# Patient Record
Sex: MALE | Race: WHITE | URBAN - METROPOLITAN AREA
[De-identification: names, ages, dates, MRNs, and addresses within clinical notes are randomized per-mention and may not be internally consistent; named-entity substitution may affect disease eponyms.]

---

## 2017-03-21 PROBLEM — Z00.00 ENCOUNTER FOR PREVENTIVE HEALTH EXAMINATION: Status: ACTIVE | Noted: 2017-03-21

## 2017-11-06 ENCOUNTER — INPATIENT (INPATIENT)
Facility: HOSPITAL | Age: 37
LOS: 2 days | Discharge: HOME | End: 2017-11-09
Attending: FAMILY MEDICINE | Admitting: FAMILY MEDICINE
Payer: COMMERCIAL

## 2017-11-06 VITALS — WEIGHT: 199.96 LBS

## 2017-11-06 LAB
ALBUMIN SERPL ELPH-MCNC: 3.9 G/DL — SIGNIFICANT CHANGE UP (ref 3.3–5)
ALP SERPL-CCNC: 95 U/L — SIGNIFICANT CHANGE UP (ref 40–120)
ALT FLD-CCNC: 35 U/L — SIGNIFICANT CHANGE UP (ref 12–78)
AMPHET UR-MCNC: NEGATIVE — SIGNIFICANT CHANGE UP
ANION GAP SERPL CALC-SCNC: 5 MMOL/L — SIGNIFICANT CHANGE UP (ref 5–17)
ANISOCYTOSIS BLD QL: SLIGHT — SIGNIFICANT CHANGE UP
APAP SERPL-MCNC: < 2 UG/ML — SIGNIFICANT CHANGE UP (ref 10–30)
APPEARANCE UR: CLEAR — SIGNIFICANT CHANGE UP
APTT BLD: 30.5 SEC — SIGNIFICANT CHANGE UP (ref 27.5–37.4)
AST SERPL-CCNC: 15 U/L — SIGNIFICANT CHANGE UP (ref 15–37)
BARBITURATES UR SCN-MCNC: NEGATIVE — SIGNIFICANT CHANGE UP
BASE EXCESS BLDV CALC-SCNC: 1.9 MMOL/L — SIGNIFICANT CHANGE UP (ref -2–2)
BASOPHILS # BLD AUTO: 0.1 K/UL — SIGNIFICANT CHANGE UP (ref 0–0.2)
BASOPHILS NFR BLD AUTO: 0.9 % — SIGNIFICANT CHANGE UP (ref 0–2)
BENZODIAZ UR-MCNC: POSITIVE — SIGNIFICANT CHANGE UP
BILIRUB SERPL-MCNC: 0.3 MG/DL — SIGNIFICANT CHANGE UP (ref 0.2–1.2)
BILIRUB UR-MCNC: NEGATIVE — SIGNIFICANT CHANGE UP
BUN SERPL-MCNC: 15 MG/DL — SIGNIFICANT CHANGE UP (ref 7–23)
CALCIUM SERPL-MCNC: 9.2 MG/DL — SIGNIFICANT CHANGE UP (ref 8.5–10.1)
CHLORIDE SERPL-SCNC: 105 MMOL/L — SIGNIFICANT CHANGE UP (ref 96–108)
CO2 SERPL-SCNC: 28 MMOL/L — SIGNIFICANT CHANGE UP (ref 22–31)
COCAINE METAB.OTHER UR-MCNC: NEGATIVE — SIGNIFICANT CHANGE UP
COLOR SPEC: YELLOW — SIGNIFICANT CHANGE UP
CREAT SERPL-MCNC: 1 MG/DL — SIGNIFICANT CHANGE UP (ref 0.5–1.3)
DIFF PNL FLD: NEGATIVE — SIGNIFICANT CHANGE UP
EOSINOPHIL # BLD AUTO: 0.7 K/UL — HIGH (ref 0–0.5)
EOSINOPHIL NFR BLD AUTO: 5.4 % — SIGNIFICANT CHANGE UP (ref 0–6)
ETHANOL SERPL-MCNC: <10 MG/DL — SIGNIFICANT CHANGE UP (ref 0–10)
GLUCOSE SERPL-MCNC: 86 MG/DL — SIGNIFICANT CHANGE UP (ref 70–99)
GLUCOSE UR QL: NEGATIVE MG/DL — SIGNIFICANT CHANGE UP
HCO3 BLDV-SCNC: 27 MMOL/L — SIGNIFICANT CHANGE UP (ref 21–29)
HCT VFR BLD CALC: 40.6 % — SIGNIFICANT CHANGE UP (ref 39–50)
HGB BLD-MCNC: 13.6 G/DL — SIGNIFICANT CHANGE UP (ref 13–17)
INR BLD: 1 RATIO — SIGNIFICANT CHANGE UP (ref 0.88–1.16)
KETONES UR-MCNC: NEGATIVE — SIGNIFICANT CHANGE UP
LACTATE SERPL-SCNC: 1.1 MMOL/L — SIGNIFICANT CHANGE UP (ref 0.7–2)
LEUKOCYTE ESTERASE UR-ACNC: NEGATIVE — SIGNIFICANT CHANGE UP
LYMPHOCYTES # BLD AUTO: 2.7 K/UL — SIGNIFICANT CHANGE UP (ref 1–3.3)
LYMPHOCYTES # BLD AUTO: 21.7 % — SIGNIFICANT CHANGE UP (ref 13–44)
MACROCYTES BLD QL: SLIGHT — SIGNIFICANT CHANGE UP
MAGNESIUM SERPL-MCNC: 2.3 MG/DL — SIGNIFICANT CHANGE UP (ref 1.6–2.6)
MANUAL DIF COMMENT BLD-IMP: SIGNIFICANT CHANGE UP
MCHC RBC-ENTMCNC: 33.5 GM/DL — SIGNIFICANT CHANGE UP (ref 32–36)
MCHC RBC-ENTMCNC: 36.4 PG — HIGH (ref 27–34)
MCV RBC AUTO: 108.4 FL — HIGH (ref 80–100)
METHADONE UR-MCNC: NEGATIVE — SIGNIFICANT CHANGE UP
METHGB MFR BLDV: 0.8 % — SIGNIFICANT CHANGE UP (ref 0–1.5)
MONOCYTES # BLD AUTO: 1.6 K/UL — HIGH (ref 0–0.9)
MONOCYTES NFR BLD AUTO: 12.3 % — SIGNIFICANT CHANGE UP (ref 2–14)
NEUTROPHILS # BLD AUTO: 7.5 K/UL — HIGH (ref 1.8–7.4)
NEUTROPHILS NFR BLD AUTO: 59.7 % — SIGNIFICANT CHANGE UP (ref 43–77)
NEUTS HYPERSEG # BLD: PRESENT — SIGNIFICANT CHANGE UP
NITRITE UR-MCNC: NEGATIVE — SIGNIFICANT CHANGE UP
OPIATES UR-MCNC: NEGATIVE — SIGNIFICANT CHANGE UP
PCO2 BLDV: 46 MMHG — SIGNIFICANT CHANGE UP (ref 35–50)
PCP SPEC-MCNC: SIGNIFICANT CHANGE UP
PCP UR-MCNC: NEGATIVE — SIGNIFICANT CHANGE UP
PH BLDV: 7.38 — SIGNIFICANT CHANGE UP (ref 7.35–7.45)
PH UR: 7 — SIGNIFICANT CHANGE UP (ref 5–8)
PHOSPHATE SERPL-MCNC: 4.5 MG/DL — SIGNIFICANT CHANGE UP (ref 2.5–4.5)
PLAT MORPH BLD: NORMAL — SIGNIFICANT CHANGE UP
PLATELET # BLD AUTO: 374 K/UL — SIGNIFICANT CHANGE UP (ref 150–400)
PO2 BLDV: 129 MMHG — HIGH (ref 25–45)
POIKILOCYTOSIS BLD QL AUTO: SLIGHT — SIGNIFICANT CHANGE UP
POLYCHROMASIA BLD QL SMEAR: SLIGHT — SIGNIFICANT CHANGE UP
POTASSIUM SERPL-MCNC: 4.2 MMOL/L — SIGNIFICANT CHANGE UP (ref 3.5–5.3)
POTASSIUM SERPL-SCNC: 4.2 MMOL/L — SIGNIFICANT CHANGE UP (ref 3.5–5.3)
PROT SERPL-MCNC: 7.9 GM/DL — SIGNIFICANT CHANGE UP (ref 6–8.3)
PROT UR-MCNC: NEGATIVE MG/DL — SIGNIFICANT CHANGE UP
PROTHROM AB SERPL-ACNC: 10.8 SEC — SIGNIFICANT CHANGE UP (ref 9.8–12.7)
RBC # BLD: 3.75 M/UL — LOW (ref 4.2–5.8)
RBC # FLD: 16.2 % — HIGH (ref 10.3–14.5)
RBC BLD AUTO: (no result)
ROULEAUX BLD QL SMEAR: PRESENT — SIGNIFICANT CHANGE UP
SALICYLATES SERPL-MCNC: <1.7 MG/DL — LOW (ref 2.8–20)
SAO2 % BLDV: 96 % — HIGH (ref 67–88)
SODIUM SERPL-SCNC: 138 MMOL/L — SIGNIFICANT CHANGE UP (ref 135–145)
SP GR SPEC: 1.01 — SIGNIFICANT CHANGE UP (ref 1.01–1.02)
STOMATOCYTES BLD QL SMEAR: PRESENT — SIGNIFICANT CHANGE UP
THC UR QL: POSITIVE — SIGNIFICANT CHANGE UP
TSH SERPL-MCNC: 2.53 UIU/ML — SIGNIFICANT CHANGE UP (ref 0.36–3.74)
UROBILINOGEN FLD QL: NEGATIVE MG/DL — SIGNIFICANT CHANGE UP
WBC # BLD: 12.6 K/UL — HIGH (ref 3.8–10.5)
WBC # FLD AUTO: 12.6 K/UL — HIGH (ref 3.8–10.5)

## 2017-11-06 PROCEDURE — 99285 EMERGENCY DEPT VISIT HI MDM: CPT

## 2017-11-06 PROCEDURE — 93010 ELECTROCARDIOGRAM REPORT: CPT

## 2017-11-06 PROCEDURE — 70450 CT HEAD/BRAIN W/O DYE: CPT | Mod: 26

## 2017-11-06 RX ORDER — THIAMINE MONONITRATE (VIT B1) 100 MG
100 TABLET ORAL ONCE
Qty: 0 | Refills: 0 | Status: COMPLETED | OUTPATIENT
Start: 2017-11-06 | End: 2017-11-06

## 2017-11-06 RX ORDER — PREGABALIN 225 MG/1
800 CAPSULE ORAL ONCE
Qty: 0 | Refills: 0 | Status: COMPLETED | OUTPATIENT
Start: 2017-11-06 | End: 2017-11-06

## 2017-11-06 RX ORDER — FOLIC ACID 0.8 MG
1 TABLET ORAL ONCE
Qty: 0 | Refills: 0 | Status: COMPLETED | OUTPATIENT
Start: 2017-11-06 | End: 2017-11-06

## 2017-11-06 RX ORDER — PREGABALIN 225 MG/1
200 CAPSULE ORAL ONCE
Qty: 0 | Refills: 0 | Status: COMPLETED | OUTPATIENT
Start: 2017-11-06 | End: 2017-11-06

## 2017-11-06 RX ADMIN — PREGABALIN 200 MICROGRAM(S): 225 CAPSULE ORAL at 20:22

## 2017-11-06 RX ADMIN — PREGABALIN 800 MICROGRAM(S): 225 CAPSULE ORAL at 21:00

## 2017-11-06 RX ADMIN — Medication 100 MILLIGRAM(S): at 22:35

## 2017-11-06 RX ADMIN — Medication 1 MILLIGRAM(S): at 22:35

## 2017-11-06 NOTE — ED PROVIDER NOTE - MEDICAL DECISION MAKING DETAILS
35 yo male with ataxic gait and diffuse numbness secondary to daily nitrous oxide abuse for the past month.  Will obtain labs and consult Toxicology.

## 2017-11-06 NOTE — ED PROVIDER NOTE - ENMT, MLM
Airway patent, Nasal mucosa clear. Mouth with mildly dry mucosa. Throat has no vesicles, no oropharyngeal exudates and uvula is midline. Neck non-tender, supple.

## 2017-11-06 NOTE — ED STATDOCS - FALLEN IN THE PAST
Called again, made contact with Dain who states she has been checking her bs levels x 1 week. Unclear how her bs overall is doing, she did not give me actual readings. States she will bring them all in on Monday, 9/18/2018. Informs me her fasting bs have been 90-95. One to two hours postprandials have been a wide range of 100- 174, with x 1 highest reading of 198 (1 hr postprandial). Unable to get a good feel of what she may need further education on. Will wait to see what her readings actually are, eating habits,exercise, etc. Message routed to inform Dr Jenkins. Matilde HERRERA   no

## 2017-11-06 NOTE — ED PROVIDER NOTE - PROGRESS NOTE DETAILS
Hilario Marinelli ([facility]) sean Kruse: paging [facility] Toxicology on call Hilario Marinelli (scribe) for Dr. Kruse: Spoke with Toxicology, agrees with admission to Med with in-patient Neuro consult, recommends brain MRI/MRN as nitrous oxide abuse leads to posterior column spinal cord manifestations as well as cerebellar degenerative changes, some of which may become chronic.  Toxicology will call back in 1-2 hours when labs result Hilario Marinelli ([facility]) for Dr. Kruse: paging [facility] Toxicology on call -- [phone] Dr. Kruse:  [facility] Tox advises po Thiamine, folate, as well as inpt. MRI brain & spinal cord, inpt. Neuro consult.

## 2017-11-06 NOTE — ED ADULT TRIAGE NOTE - CHIEF COMPLAINT QUOTE
pt c/o numbness to his legs pt walk with unsteady gait. pt has been sniffing nitrous oxide was send by Dr Donaldson to r/o acute toxicity pt c/o numbness to his legs pt  is walking  with unsteady gait. pt has been sniffing nitrous oxide was send by Dr Donaldson to r/o acute toxicity. last nitrous oxide use  was on last Thursday

## 2017-11-06 NOTE — ED ADULT NURSE NOTE - CHIEF COMPLAINT QUOTE
pt c/o numbness to his legs pt  is walking  with unsteady gait. pt has been sniffing nitrous oxide was send by Dr Donaldson to r/o acute toxicity. last nitrous oxide use  was on last Thursday

## 2017-11-06 NOTE — ED PROVIDER NOTE - NEUROLOGICAL, MLM
A&Ox3.  Decreased fine motor skills of hands.  Bilateral lower extremities with decreased sensation, worse distally. +Ataxic gait. CN II-XII intact.

## 2017-11-06 NOTE — ED PROVIDER NOTE - MUSCULOSKELETAL, MLM
KHANNA x4. UPPER EXTREMITIES: normal gross motor and sensation, +Decreased fine motor skills, normal hand grasp bilat.  LOWER EXTREMITIES: normal strength bilat.

## 2017-11-06 NOTE — ED STATDOCS - OBJECTIVE STATEMENT
36 y-o Male with no significant PMHX presents to the ED c/o numbness. Pt states Friday was last day he did whip its and since then has had unstable gait and hand and feet numbness. + confusion. Denies SOB, CP, N/V/D Pt will be sent to main for further Evaluation. 36 y-o Male with no significant PMHx presents to the ED c/o numbness. Pt states Friday was last day he did whippits (was using >1 box every day x 6 weeks) and since then has had unstable gait and hand and feet numbness. + confusion. Denies SOB, CP, N/V/D Pt will be sent to main for further Evaluation.

## 2017-11-06 NOTE — ED PROVIDER NOTE - OBJECTIVE STATEMENT
35 yo male presents with gait ataxia and diffuse numbness and tingling secondary to sniffing nitrous oxide daily for the last 4 weeks.  States that he stopped using on Friday but symptoms have been progressively worsening since then.  Reports inability to walk and write, has numbness and tingling to hands and from the mid-chest down.  +Diffuse myalgias.  Denies abd pain, N/V/D, fever, headache.  +Marijuana use.  Denies alcohol use.  Pt states he has been on antibiotics and advair for respiratory infection for the past 2-3 weeks.  No recent falls.  PCP Dr. Carranza.

## 2017-11-07 DIAGNOSIS — F90.9 ATTENTION-DEFICIT HYPERACTIVITY DISORDER, UNSPECIFIED TYPE: ICD-10-CM

## 2017-11-07 DIAGNOSIS — Z98.890 OTHER SPECIFIED POSTPROCEDURAL STATES: Chronic | ICD-10-CM

## 2017-11-07 DIAGNOSIS — R20.0 ANESTHESIA OF SKIN: ICD-10-CM

## 2017-11-07 DIAGNOSIS — Z29.9 ENCOUNTER FOR PROPHYLACTIC MEASURES, UNSPECIFIED: ICD-10-CM

## 2017-11-07 DIAGNOSIS — F17.200 NICOTINE DEPENDENCE, UNSPECIFIED, UNCOMPLICATED: ICD-10-CM

## 2017-11-07 DIAGNOSIS — R26.0 ATAXIC GAIT: ICD-10-CM

## 2017-11-07 DIAGNOSIS — J44.9 CHRONIC OBSTRUCTIVE PULMONARY DISEASE, UNSPECIFIED: ICD-10-CM

## 2017-11-07 DIAGNOSIS — J40 BRONCHITIS, NOT SPECIFIED AS ACUTE OR CHRONIC: ICD-10-CM

## 2017-11-07 DIAGNOSIS — G62.2 POLYNEUROPATHY DUE TO OTHER TOXIC AGENTS: ICD-10-CM

## 2017-11-07 LAB
ANION GAP SERPL CALC-SCNC: 5 MMOL/L — SIGNIFICANT CHANGE UP (ref 5–17)
ANISOCYTOSIS BLD QL: SLIGHT — SIGNIFICANT CHANGE UP
BASOPHILS # BLD AUTO: 0.1 K/UL — SIGNIFICANT CHANGE UP (ref 0–0.2)
BASOPHILS NFR BLD AUTO: 1.2 % — SIGNIFICANT CHANGE UP (ref 0–2)
BUN SERPL-MCNC: 15 MG/DL — SIGNIFICANT CHANGE UP (ref 7–23)
CALCIUM SERPL-MCNC: 8.4 MG/DL — LOW (ref 8.5–10.1)
CHLORIDE SERPL-SCNC: 108 MMOL/L — SIGNIFICANT CHANGE UP (ref 96–108)
CO2 SERPL-SCNC: 25 MMOL/L — SIGNIFICANT CHANGE UP (ref 22–31)
CREAT SERPL-MCNC: 0.93 MG/DL — SIGNIFICANT CHANGE UP (ref 0.5–1.3)
EOSINOPHIL # BLD AUTO: 0.6 K/UL — HIGH (ref 0–0.5)
EOSINOPHIL NFR BLD AUTO: 5.5 % — SIGNIFICANT CHANGE UP (ref 0–6)
FOLATE SERPL-MCNC: >20 NG/ML — SIGNIFICANT CHANGE UP (ref 4.8–24.2)
GLUCOSE SERPL-MCNC: 116 MG/DL — HIGH (ref 70–99)
HCT VFR BLD CALC: 37.8 % — LOW (ref 39–50)
HCYS SERPL-MCNC: 22.4 UMOL/L — HIGH (ref 5–15)
HGB BLD-MCNC: 12.6 G/DL — LOW (ref 13–17)
LG PLATELETS BLD QL AUTO: SLIGHT — SIGNIFICANT CHANGE UP
LYMPHOCYTES # BLD AUTO: 3.5 K/UL — HIGH (ref 1–3.3)
LYMPHOCYTES # BLD AUTO: 30.3 % — SIGNIFICANT CHANGE UP (ref 13–44)
MACROCYTES BLD QL: SIGNIFICANT CHANGE UP
MANUAL DIF COMMENT BLD-IMP: SIGNIFICANT CHANGE UP
MCHC RBC-ENTMCNC: 33.2 GM/DL — SIGNIFICANT CHANGE UP (ref 32–36)
MCHC RBC-ENTMCNC: 36.5 PG — HIGH (ref 27–34)
MCV RBC AUTO: 109.9 FL — HIGH (ref 80–100)
MONOCYTES # BLD AUTO: 1.5 K/UL — HIGH (ref 0–0.9)
MONOCYTES NFR BLD AUTO: 13 % — SIGNIFICANT CHANGE UP (ref 2–14)
NEUTROPHILS # BLD AUTO: 5.8 K/UL — SIGNIFICANT CHANGE UP (ref 1.8–7.4)
NEUTROPHILS NFR BLD AUTO: 50 % — SIGNIFICANT CHANGE UP (ref 43–77)
PLAT MORPH BLD: NORMAL — SIGNIFICANT CHANGE UP
PLATELET # BLD AUTO: 336 K/UL — SIGNIFICANT CHANGE UP (ref 150–400)
POIKILOCYTOSIS BLD QL AUTO: SLIGHT — SIGNIFICANT CHANGE UP
POLYCHROMASIA BLD QL SMEAR: SLIGHT — SIGNIFICANT CHANGE UP
POTASSIUM SERPL-MCNC: 3.8 MMOL/L — SIGNIFICANT CHANGE UP (ref 3.5–5.3)
POTASSIUM SERPL-SCNC: 3.8 MMOL/L — SIGNIFICANT CHANGE UP (ref 3.5–5.3)
RBC # BLD: 3.44 M/UL — LOW (ref 4.2–5.8)
RBC # FLD: 16.4 % — HIGH (ref 10.3–14.5)
RBC BLD AUTO: (no result)
SODIUM SERPL-SCNC: 138 MMOL/L — SIGNIFICANT CHANGE UP (ref 135–145)
VIT B12 SERPL-MCNC: 874 PG/ML — SIGNIFICANT CHANGE UP (ref 243–894)
WBC # BLD: 11.6 K/UL — HIGH (ref 3.8–10.5)
WBC # FLD AUTO: 11.6 K/UL — HIGH (ref 3.8–10.5)

## 2017-11-07 PROCEDURE — 70551 MRI BRAIN STEM W/O DYE: CPT | Mod: 26

## 2017-11-07 PROCEDURE — 99254 IP/OBS CNSLTJ NEW/EST MOD 60: CPT | Mod: 25

## 2017-11-07 PROCEDURE — 72146 MRI CHEST SPINE W/O DYE: CPT | Mod: 26

## 2017-11-07 PROCEDURE — 62270 DX LMBR SPI PNXR: CPT

## 2017-11-07 PROCEDURE — 72141 MRI NECK SPINE W/O DYE: CPT | Mod: 26

## 2017-11-07 RX ORDER — PREGABALIN 225 MG/1
1000 CAPSULE ORAL DAILY
Qty: 0 | Refills: 0 | Status: DISCONTINUED | OUTPATIENT
Start: 2017-11-07 | End: 2017-11-07

## 2017-11-07 RX ORDER — THIAMINE MONONITRATE (VIT B1) 100 MG
100 TABLET ORAL DAILY
Qty: 0 | Refills: 0 | Status: DISCONTINUED | OUTPATIENT
Start: 2017-11-07 | End: 2017-11-09

## 2017-11-07 RX ORDER — FOLIC ACID 0.8 MG
1 TABLET ORAL DAILY
Qty: 0 | Refills: 0 | Status: DISCONTINUED | OUTPATIENT
Start: 2017-11-07 | End: 2017-11-09

## 2017-11-07 RX ORDER — ALBUTEROL 90 UG/1
2 AEROSOL, METERED ORAL EVERY 6 HOURS
Qty: 0 | Refills: 0 | Status: DISCONTINUED | OUTPATIENT
Start: 2017-11-07 | End: 2017-11-09

## 2017-11-07 RX ORDER — HEPARIN SODIUM 5000 [USP'U]/ML
5000 INJECTION INTRAVENOUS; SUBCUTANEOUS EVERY 8 HOURS
Qty: 0 | Refills: 0 | Status: DISCONTINUED | OUTPATIENT
Start: 2017-11-07 | End: 2017-11-09

## 2017-11-07 RX ORDER — PREGABALIN 225 MG/1
1000 CAPSULE ORAL DAILY
Qty: 0 | Refills: 0 | Status: DISCONTINUED | OUTPATIENT
Start: 2017-11-07 | End: 2017-11-09

## 2017-11-07 RX ORDER — DEXTROAMPHETAMINE SACCHARATE, AMPHETAMINE ASPARTATE, DEXTROAMPHETAMINE SULFATE AND AMPHETAMINE SULFATE 1.875; 1.875; 1.875; 1.875 MG/1; MG/1; MG/1; MG/1
10 TABLET ORAL THREE TIMES A DAY
Qty: 0 | Refills: 0 | Status: DISCONTINUED | OUTPATIENT
Start: 2017-11-07 | End: 2017-11-09

## 2017-11-07 RX ORDER — ACETAMINOPHEN 500 MG
650 TABLET ORAL EVERY 6 HOURS
Qty: 0 | Refills: 0 | Status: DISCONTINUED | OUTPATIENT
Start: 2017-11-07 | End: 2017-11-09

## 2017-11-07 RX ADMIN — DEXTROAMPHETAMINE SACCHARATE, AMPHETAMINE ASPARTATE, DEXTROAMPHETAMINE SULFATE AND AMPHETAMINE SULFATE 10 MILLIGRAM(S): 1.875; 1.875; 1.875; 1.875 TABLET ORAL at 05:40

## 2017-11-07 RX ADMIN — Medication 1 MILLIGRAM(S): at 11:24

## 2017-11-07 RX ADMIN — HEPARIN SODIUM 5000 UNIT(S): 5000 INJECTION INTRAVENOUS; SUBCUTANEOUS at 13:48

## 2017-11-07 RX ADMIN — DEXTROAMPHETAMINE SACCHARATE, AMPHETAMINE ASPARTATE, DEXTROAMPHETAMINE SULFATE AND AMPHETAMINE SULFATE 10 MILLIGRAM(S): 1.875; 1.875; 1.875; 1.875 TABLET ORAL at 13:46

## 2017-11-07 RX ADMIN — PREGABALIN 1000 MICROGRAM(S): 225 CAPSULE ORAL at 11:24

## 2017-11-07 RX ADMIN — Medication 0.5 MILLIGRAM(S): at 05:40

## 2017-11-07 RX ADMIN — Medication 0.5 MILLIGRAM(S): at 19:21

## 2017-11-07 RX ADMIN — Medication 100 MILLIGRAM(S): at 11:24

## 2017-11-07 RX ADMIN — HEPARIN SODIUM 5000 UNIT(S): 5000 INJECTION INTRAVENOUS; SUBCUTANEOUS at 05:40

## 2017-11-07 RX ADMIN — DEXTROAMPHETAMINE SACCHARATE, AMPHETAMINE ASPARTATE, DEXTROAMPHETAMINE SULFATE AND AMPHETAMINE SULFATE 10 MILLIGRAM(S): 1.875; 1.875; 1.875; 1.875 TABLET ORAL at 21:09

## 2017-11-07 RX ADMIN — HEPARIN SODIUM 5000 UNIT(S): 5000 INJECTION INTRAVENOUS; SUBCUTANEOUS at 21:09

## 2017-11-07 NOTE — CONSULT NOTE ADULT - NEGATIVE MUSCULOSKELETAL SYMPTOMS
no arthralgia/no myalgia/no muscle cramps/no neck pain/no joint swelling/no muscle weakness/no back pain/no leg pain L/no leg pain R

## 2017-11-07 NOTE — H&P ADULT - PROBLEM SELECTOR PLAN 2
Likely secondary to B12 deficiency due to nitrous oxide use.  - B12 injection  - Folate/thiamine Likely secondary to B12 deficiency due to nitrous oxide use.  - STAT B12 injection in ED  - STAT Folate in ED Likely secondary to B12 deficiency due to nitrous oxide use.  - STAT B12 injection in ED  - STAT Folate/ Thiamine in ED Likely secondary to B12 deficiency due to nitrous oxide use.  - STAT 1 mg B12 injection in ED  - STAT Folate/ Thiamine in ED  - Vitamin B12 1 mg PO daily  - F/U MRI Brain/Spine  - F/U Neurology Consult Likely secondary to B12 deficiency due to nitrous oxide use.  - STAT 1 mg B12 injection/Folate/ Thiamine in ED  - Vitamin B12 1 mg PO daily  - Thiamine PO daily  - Folate PO daily  - F/U MRI Brain/Spine due to potential structural changes to posterior column  - F/U Neurology Consult Likely secondary to B12 deficiency due to nitrous oxide use.  - STAT 1 mg B12 injection/Folate/ Thiamine in ED  - Vitamin B12 1 mg PO daily  - Thiamine 100 mg PO daily  - Folate 1 mg PO daily  - F/U MRI Brain/Spine due to potential structural changes to posterior column  - F/U Neurology Consult

## 2017-11-07 NOTE — H&P ADULT - RS GEN PE MLT RESP DETAILS PC
rhonchi/good air movement/respirations non-labored clear to auscultation bilaterally/breath sounds equal/good air movement/respirations non-labored

## 2017-11-07 NOTE — H&P ADULT - ASSESSMENT
37 y/o man presents with a 4 day history of loss of fine motor control, difficulty walking and writing and patchy numbness and tingling. 35 y/o man presents with a 4 day history of loss of fine motor control, difficulty walking and writing and patchy numbness and tingling. Pt has a 6 week history of inhaling nitrous oxide.

## 2017-11-07 NOTE — CONSULT NOTE ADULT - ASSESSMENT
36 year old man admitted with c/o numbness, paresthesias, on exam findings c/w peripheral neuropathy no long tract signs. Possibly due to nitrous oxide use, which can cause a demyelinating neuropathy, ? GBS.

## 2017-11-07 NOTE — H&P ADULT - PROBLEM SELECTOR PLAN 1
Likely secondary to Likely secondary to B12 deficiency due to nitrous oxide use.  - B12 injection  - Folate/ti Likely secondary to B12 deficiency due to nitrous oxide use. Likely secondary to B12 deficiency due to nitrous oxide use.  - STAT B12 injection in ED  - STAT Folate/ Thiamine in ED Likely secondary to B12 deficiency due to nitrous oxide use.  - STAT 1 mg B12 injection in ED  - STAT Folate/ Thiamine in ED  - Vitamin B12 1mg PO daily  - F/U MRI Brain/Spine  - F/U Neurology Consult Likely secondary to B12 deficiency due to nitrous oxide use.  - STAT 1 mg B12 injection/ Folate/ Thiamine in ED  - Vitamin B12 1mg PO daily  - Thiamine PO daily  - Folate PO daily  - F/U MRI Brain/Spine due to potential structural changes to posterior column  - F/U Neurology Consult Likely secondary to B12 deficiency due to nitrous oxide use.  - STAT 1 mg B12 injection/ Folate/ Thiamine in ED  - Vitamin B12 1mg PO daily  - Thiamine 100 mg PO daily  - Folate 1 mg PO daily  - F/U MRI Brain/Spine due to potential structural changes to posterior column  - F/U Neurology Consult

## 2017-11-07 NOTE — H&P ADULT - NSHPPHYSICALEXAM_GEN_ALL_CORE
ICU Vital Signs Last 24 Hrs    T(F): 98.4 (07 Nov 2017 03:39), Max: 98.4 (06 Nov 2017 18:40)    HR: 88 (07 Nov 2017 03:39) (73 - 122)    BP: 115/87 (07 Nov 2017 03:39) (115/87 - 144/99)    RR: 18   SpO2: 97%

## 2017-11-07 NOTE — PROGRESS NOTE ADULT - SUBJECTIVE AND OBJECTIVE BOX
Pt has been seen and examined with FP resident, resident supervised agree with a/p       Patient is a 36y old  Male who presents with a chief complaint of Numbness/Tingling/ Difficulty walking & writing (07 Nov 2017 01:05)        HPI:  35 y/o man presents with a 4 day history of loss of fine motor control, difficulty walking and writing and patchy numbness and tingling. Patient describes difficulty walking and writing since this past Friday. He has had difficulty dressing himself and tying his shoes. He notes an associated numbness and tingling that occurs in patches from his xyphoid process distally to his feet. Patient reports a 6 week history of inhaling nitrous oxide purchased on the street. He quickly became addicted and symptoms began on Friday when he stopped inhaling nitrous oxide. Patient recently completed a course of antibiotics for sinus infection approx 2 weeks ago. He denies any muscle aches or spasms, CP, SOB, HA, dizziness, n/v or changes to his bowels.       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

## 2017-11-07 NOTE — H&P ADULT - NSHPSOCIALHISTORY_GEN_ALL_CORE
Patient Patient has a 20 pack-year smoking history, smokes marijuana intermittently and has a recent 6 week history of using inhaled nitrous oxide. He denies any alcohol use. Patient has a 20 pack-year smoking history, smokes marijuana and cocaine intermittently and has a recent 6 week history of using inhaled nitrous oxide. He denies any alcohol use.

## 2017-11-07 NOTE — CONSULT NOTE ADULT - PROBLEM SELECTOR RECOMMENDATION 9
methylmalonic acid, homocysteine serum, consider LP.  continue B12 supplementation.  emg/ncv as outpatient.  Physical therapy evaluation.

## 2017-11-07 NOTE — PROGRESS NOTE ADULT - SUBJECTIVE AND OBJECTIVE BOX
35 y/o man with PMHx of  c/o of numbness and tingling in b/l lower extremities ascending from his lower extremities to his upper extremities for past 3-4 days. Pt reports that the symptoms come and ago and are beginning to be more constant and affecting his daily functioning. He describes the numbness and tingling as a pinching sensation. Pt reports he has a history of taking nitrous oxide which he purchased on the street for past 2 months. He states that he never had this before and is concerned that the NO could have been causing his symptoms. Pt mentions nothing makes his symptoms or worse. However, he is finding it very difficult to  walk and write and tie his shoes and noticed he is having more difficulty walking. He also reports sometimes the numbness and tingling radiates up to his chest but denies any chest pain. Pt denies any trauma, pain in his upper or lower extremities, fever, headache, dizziness,  neck pain, body aches, chills, sob, wheezing, chest congestion, palpitations, nausea, vomiting, abdominal pain, diarrhea or constipation. In addition, pt denies any recent travel, his immunizations being up to date, family history or any sick contacts. During Pt ED visit he received a STAT dose of vitamin B12 injection, folate and thiamine.    2017: Pt is resting at bedside with mother present. Pt is waiting to go for his MRI and has no further complaints. Pt is anxious to figure out what is going on.     REVIEW OF SYSTEMS:  General: NAD, hemodynamically stable, (-)  fever, (-) chills, (-) weakness  HEENT:  Eyes:  No visual loss, blurred vision, double vision or yellow sclerae. Ears, Nose, Throat:  No hearing loss, sneezing, congestion, runny nose or sore throat.  SKIN:  No rash or itching.  CARDIOVASCULAR:  No chest pain, chest pressure or chest discomfort. No palpitations or edema.  RESPIRATORY:  No shortness of breath, cough or sputum.  GASTROINTESTINAL:  No anorexia, nausea, vomiting or diarrhea. No abdominal pain or blood.  NEUROLOGICAL:  +  ataxia, + numbness or tingling in the upper and lower b/l extremities. No change in bowel or bladder control.  MUSCULOSKELETAL:  No muscle, back pain, joint pain or stiffness.  HEMATOLOGIC:  No anemia, bleeding or bruising.  LYMPHATICS:  No enlarged nodes.   ENDOCRINOLOGIC:  No reports of sweating, cold or heat intolerance. No polyuria or polydipsia.    ICU Vital Signs Last 24 Hrs  T(C): 36.7 (2017 16:52), Max: 36.9 (2017 18:40)  T(F): 98 (2017 16:52), Max: 98.4 (2017 18:40)  HR: 86 (2017 16:52) (73 - 122)  BP: 121/76 (2017 16:52) (114/72 - 144/99)  RR: 17 (2017 16:52) (17 - 19)  SpO2: 100% (2017 16:52) (97% - 100%)    PHYSICAL EXAM:  GENERAL: NAD, well-groomed, well-developed  HEAD:  NC/AT  EYES: EOMI, PERRLA, no scleral icterus  HEENT: Moist mucous membranes  NECK: Supple, No JVD  CNS:  Alert & Oriented X3, Motor Strength 5/5  upper and 4/5 lower extremities; + Ataxic Gait,  Sensation intact in upper and lower extremities.   LUNG: Clear to auscultation bilaterally; No rales, rhonchi, wheezing, or rubs  HEART: RRR; No murmurs, rubs, or gallops  ABDOMEN: +BS, ST/ND/NT  GENITOURINARY- Voiding, Bladder not distended  EXTREMITIES:  2+ Peripheral Pulses, No clubbing, cyanosis, or edema  MUSCULOSKELETAL Joints normal ROM, no Muscle or joint tenderness    MEDICATIONS  (STANDING):  amphetamine/dextroamphetamine 10 milliGRAM(s) Oral three times a day  cyanocobalamin Injectable 1000 MICROGram(s) IntraMuscular daily  folic acid 1 milliGRAM(s) Oral daily  heparin  Injectable 5000 Unit(s) SubCutaneous every 8 hours  LORazepam     Tablet 0.5 milliGRAM(s) Oral every 12 hours  thiamine 100 milliGRAM(s) Oral daily    MEDICATIONS  (PRN):  acetaminophen   Tablet. 650 milliGRAM(s) Oral every 6 hours PRN Mild Pain (1 - 3)  ALBUTerol    90 MICROgram(s) HFA Inhaler 2 Puff(s) Inhalation every 6 hours PRN Shortness of Breath and/or Wheezing      Labs                     12.6   11.6  )-----------( 336      ( 2017 06:17 )             37.8     2017 06:17    138    |  108    |  15     ----------------------------<  116    3.8     |  25     |  0.93     Ca    8.4        2017 06:17  Phos  4.5       2017 19:46  Mg     2.3       2017 19:46    TPro  7.9    /  Alb  3.9    /  TBili  0.3    /  DBili  x      /  AST  15     /  ALT  35     /  AlkPhos  95     2017 19:46  PT/INR - ( 2017 19:46 )   PT: 10.8 sec;   INR: 1.00 ratio    PTT - ( 2017 19:46 )  PTT:30.5 sec  CAPILLARY BLOOD GLUCOSE    LIVER FUNCTIONS - ( 2017 19:46 )  Alb: 3.9 g/dL / Pro: 7.9 gm/dL / ALK PHOS: 95 U/L / ALT: 35 U/L / AST: 15 U/L / GGT: x         Urinalysis Basic - ( 2017 19:46 )  Color: Yellow / Appearance: Clear / S.015 / pH: x  Gluc: x / Ketone: Negative  / Bili: Negative / Urobili: Negative mg/dL   Blood: x / Protein: Negative mg/dL / Nitrite: Negative   Leuk Esterase: Negative / RBC: x / WBC x   Sq Epi: x / Non Sq Epi: x / Bacteria: x 37 y/o man with PMHx of  c/o of numbness and tingling in b/l lower extremities ascending from his lower extremities to his upper extremities for past 3-4 days. Pt reports that the symptoms come and ago and are beginning to be more constant and affecting his daily functioning. He describes the numbness and tingling as a pinching sensation. Pt reports he has a history of taking nitrous oxide which he purchased on the street for past 2 months. He states that he never had this before and is concerned that the Nitrix Oxide  could have been causing his symptoms. Pt mentions nothing makes his symptoms or worse. However, he is finding it very difficult to  walk and write and tie his shoes and noticed he is having more difficulty walking. He also reports sometimes the numbness and tingling radiates up to his chest but denies any chest pain. Pt denies any trauma, pain in his upper or lower extremities, fever, headache, dizziness,  neck pain, body aches, chills, sob, wheezing, chest congestion, palpitations, nausea, vomiting, abdominal pain, diarrhea or constipation. In addition, pt denies any recent travel, his immunizations being up to date, family history or any sick contacts. During Pt ED visit he received a STAT dose of vitamin B12 injection, folate and thiamine.    2017: Pt is resting at bedside with mother present. Pt is waiting to go for his MRI and has no further complaints. Pt is anxious to figure out what is going on.     REVIEW OF SYSTEMS:  General: NAD, hemodynamically stable, (-)  fever, (-) chills, (-) weakness  HEENT:  Eyes:  No visual loss, blurred vision, double vision or yellow sclerae. Ears, Nose, Throat:  No hearing loss, sneezing, congestion, runny nose or sore throat.  SKIN:  No rash or itching.  CARDIOVASCULAR:  No chest pain, chest pressure or chest discomfort. No palpitations or edema.  RESPIRATORY:  No shortness of breath, cough or sputum.  GASTROINTESTINAL:  No anorexia, nausea, vomiting or diarrhea. No abdominal pain or blood.  NEUROLOGICAL:  +  ataxia, + numbness or tingling in the upper and lower b/l extremities. No change in bowel or bladder control.  MUSCULOSKELETAL:  No muscle, back pain, joint pain or stiffness.  HEMATOLOGIC:  No anemia, bleeding or bruising.  LYMPHATICS:  No enlarged nodes.   ENDOCRINOLOGIC:  No reports of sweating, cold or heat intolerance. No polyuria or polydipsia.    ICU Vital Signs Last 24 Hrs  T(C): 36.7 (2017 16:52), Max: 36.9 (2017 18:40)  T(F): 98 (2017 16:52), Max: 98.4 (2017 18:40)  HR: 86 (2017 16:52) (73 - 122)  BP: 121/76 (2017 16:52) (114/72 - 144/99)  RR: 17 (2017 16:52) (17 - 19)  SpO2: 100% (2017 16:52) (97% - 100%)    PHYSICAL EXAM:  GENERAL: NAD, well-groomed, well-developed  HEAD:  NC/AT  EYES: EOMI, PERRLA, no scleral icterus  HEENT: Moist mucous membranes  NECK: Supple, No JVD  CNS:  Alert & Oriented X3, Motor Strength 5/5  upper and 4/5 lower extremities; + Ataxic Gait,  Sensation intact in upper and lower extremities.   LUNG: Clear to auscultation bilaterally; No rales, rhonchi, wheezing, or rubs  HEART: RRR; No murmurs, rubs, or gallops  ABDOMEN: +BS, ST/ND/NT  GENITOURINARY- Voiding, Bladder not distended  EXTREMITIES:  2+ Peripheral Pulses, No clubbing, cyanosis, or edema  MUSCULOSKELETAL Joints normal ROM, no Muscle or joint tenderness    MEDICATIONS  (STANDING):  amphetamine/dextroamphetamine 10 milliGRAM(s) Oral three times a day  cyanocobalamin Injectable 1000 MICROGram(s) IntraMuscular daily  folic acid 1 milliGRAM(s) Oral daily  heparin  Injectable 5000 Unit(s) SubCutaneous every 8 hours  LORazepam     Tablet 0.5 milliGRAM(s) Oral every 12 hours  thiamine 100 milliGRAM(s) Oral daily    MEDICATIONS  (PRN):  acetaminophen   Tablet. 650 milliGRAM(s) Oral every 6 hours PRN Mild Pain (1 - 3)  ALBUTerol    90 MICROgram(s) HFA Inhaler 2 Puff(s) Inhalation every 6 hours PRN Shortness of Breath and/or Wheezing      Labs                     12.6   11.6  )-----------( 336      ( 2017 06:17 )             37.8     2017 06:17    138    |  108    |  15     ----------------------------<  116    3.8     |  25     |  0.93     Ca    8.4        2017 06:17  Phos  4.5       2017 19:46  Mg     2.3       2017 19:46    TPro  7.9    /  Alb  3.9    /  TBili  0.3    /  DBili  x      /  AST  15     /  ALT  35     /  AlkPhos  95     2017 19:46  PT/INR - ( 2017 19:46 )   PT: 10.8 sec;   INR: 1.00 ratio    PTT - ( 2017 19:46 )  PTT:30.5 sec  CAPILLARY BLOOD GLUCOSE    LIVER FUNCTIONS - ( 2017 19:46 )  Alb: 3.9 g/dL / Pro: 7.9 gm/dL / ALK PHOS: 95 U/L / ALT: 35 U/L / AST: 15 U/L / GGT: x         Urinalysis Basic - ( 2017 19:46 )  Color: Yellow / Appearance: Clear / S.015 / pH: x  Gluc: x / Ketone: Negative  / Bili: Negative / Urobili: Negative mg/dL   Blood: x / Protein: Negative mg/dL / Nitrite: Negative   Leuk Esterase: Negative / RBC: x / WBC x   Sq Epi: x / Non Sq Epi: x / Bacteria: x 35 y/o man with PMHx of  c/o of numbness and tingling in b/l lower extremities ascending from his lower extremities to his upper extremities for past 3-4 days. Pt reports that the symptoms come and ago and are beginning to be more constant and affecting his daily functioning. He describes the numbness and tingling as a pinching sensation. Pt reports he has a history of taking nitrous oxide which he purchased on the street for past 2 months. He states that he never had this before and is concerned that the Nitrix Oxide  could have been causing his symptoms. Pt mentions nothing makes his symptoms or worse. However, he is finding it very difficult to  walk and write and tie his shoes and noticed he is having more difficulty walking. He also reports sometimes the numbness and tingling radiates up to his chest but denies any chest pain. Pt denies any trauma, pain in his upper or lower extremities, fever, headache, dizziness,  neck pain, body aches, chills, sob, wheezing, chest congestion, palpitations, nausea, vomiting, abdominal pain, diarrhea or constipation. In addition, pt denies any recent travel, his immunizations being up to date, family history or any sick contacts. During Pt ED visit he received a STAT dose of vitamin B12 injection, folate and thiamine.    2017: Pt is resting at bedside with mother present. Pt is waiting to go for his MRI and has no further complaints. Pt is anxious to figure out what is going on.     REVIEW OF SYSTEMS:  General: NAD, hemodynamically stable, (-)  fever, (-) chills, (-) weakness  HEENT:  Eyes:  No visual loss, blurred vision, double vision or yellow sclerae. Ears, Nose, Throat:  No hearing loss, sneezing, congestion, runny nose or sore throat.  SKIN:  No rash or itching.  CARDIOVASCULAR:  No chest pain, chest pressure or chest discomfort. No palpitations or edema.  RESPIRATORY:  No shortness of breath, cough or sputum.  GASTROINTESTINAL:  No anorexia, nausea, vomiting or diarrhea. No abdominal pain or blood.  NEUROLOGICAL:  +  ataxia, + numbness or tingling in the upper and lower b/l extremities. No change in bowel or bladder control.  MUSCULOSKELETAL:  No muscle, back pain, joint pain or stiffness.  HEMATOLOGIC:  No anemia, bleeding or bruising.  LYMPHATICS:  No enlarged nodes.   ENDOCRINOLOGIC:  No reports of sweating, cold or heat intolerance. No polyuria or polydipsia.    ICU Vital Signs Last 24 Hrs  T(C): 36.7 (2017 16:52), Max: 36.9 (2017 18:40)  T(F): 98 (2017 16:52), Max: 98.4 (2017 18:40)  HR: 86 (2017 16:52) (73 - 122)  BP: 121/76 (2017 16:52) (114/72 - 144/99)  RR: 17 (2017 16:52) (17 - 19)  SpO2: 100% (2017 16:52) (97% - 100%)    PHYSICAL EXAM:  GENERAL: NAD, well-groomed, well-developed  HEAD:  NC/AT  EYES: EOMI, PERRLA, no scleral icterus  HEENT: Moist mucous membranes  NECK: Supple, No JVD  CNS:  Alert & Oriented X3, Motor Strength 5/5  upper and 4/5 lower extremities; + Ataxic Gait,  Sensation intact in upper and lower extremities.   LUNG: Clear to auscultation bilaterally; No rales, rhonchi, wheezing, or rubs  HEART: RRR; No murmurs, rubs, or gallops  ABDOMEN: +BS, ST/ND/NT  GENITOURINARY- Voiding, Bladder not distended  EXTREMITIES:  2+ Peripheral Pulses, No clubbing, cyanosis, or edema  MUSCULOSKELETAL Joints normal ROM, no Muscle or joint tenderness    MEDICATIONS  (STANDING):  amphetamine/dextroamphetamine 10 milliGRAM(s) Oral three times a day  cyanocobalamin Injectable 1000 MICROGram(s) IntraMuscular daily  folic acid 1 milliGRAM(s) Oral daily  heparin  Injectable 5000 Unit(s) SubCutaneous every 8 hours  LORazepam     Tablet 0.5 milliGRAM(s) Oral every 12 hours  thiamine 100 milliGRAM(s) Oral daily    MEDICATIONS  (PRN):  acetaminophen   Tablet. 650 milliGRAM(s) Oral every 6 hours PRN Mild Pain (1 - 3)  ALBUTerol    90 MICROgram(s) HFA Inhaler 2 Puff(s) Inhalation every 6 hours PRN Shortness of Breath and/or Wheezing      Labs                     12.6   11.6  )-----------( 336      ( 2017 06:17 )             37.8     2017 06:17    138    |  108    |  15     ----------------------------<  116    3.8     |  25     |  0.93     Ca    8.4        2017 06:17  Phos  4.5       2017 19:46  Mg     2.3       2017 19:46    TPro  7.9    /  Alb  3.9    /  TBili  0.3    /  DBili  x      /  AST  15     /  ALT  35     /  AlkPhos  95     2017 19:46  PT/INR - ( 2017 19:46 )   PT: 10.8 sec;   INR: 1.00 ratio    PTT - ( 2017 19:46 )  PTT:30.5 sec  CAPILLARY BLOOD GLUCOSE    LIVER FUNCTIONS - ( 2017 19:46 )  Alb: 3.9 g/dL / Pro: 7.9 gm/dL / ALK PHOS: 95 U/L / ALT: 35 U/L / AST: 15 U/L / GGT: x         Urinalysis Basic - ( 2017 19:46 )  Color: Yellow / Appearance: Clear / S.015 / pH: x  Gluc: x / Ketone: Negative  / Bili: Negative / Urobili: Negative mg/dL   Blood: x / Protein: Negative mg/dL / Nitrite: Negative   Leuk Esterase: Negative / RBC: x / WBC x   Sq Epi: x / Non Sq Epi: x / Bacteria: x      < from: CT Head No Cont (17 @ 20:23) >  IMPRESSION:     No acute intracranial findings.

## 2017-11-07 NOTE — H&P ADULT - ATTENDING COMMENTS
Pt seen and examined with PGY1 Dr. Ivory Ayers, Family Medicine.  Assessment and plan of care reviewed in detail,  agree with plan of care.     Pt is a 37 y/o gentleman admitted w/ataxia, loss of fine motor control, and loss of sensation/proprioception of the lower extremities.  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  - Toxicology fellow contacted by ER, and re-contacted for follow up  - cont B12, folate, thiamine  - MRI brain, spine  - Neurology consult  - substance abuse and smoking cessation, pt counseled

## 2017-11-07 NOTE — CONSULT NOTE ADULT - SUBJECTIVE AND OBJECTIVE BOX
Neurology Consult requested by:   Patient is a 36y old  Male who presents with a chief complaint of Numbness/Tingling/ Difficulty walking & writing (07 Nov 2017 01:05)     HPI:  37 y/o man presents with a 4 day history of loss of difficulty walking and writing and patchy numbness and tingling of the extremities. Denies pain, noo neck or back ache, no change in bowel or bladder habits, no headache, no vertigo. Patient reports a 6 week history of inhaling nitrous oxide purchased on the street. Patient recently completed a course of antibiotics for sinus infection approx 2 weeks ago. He denies any muscle aches or spasms, CP, SOB.    PAST MEDICAL & SURGICAL HISTORY:  Bronchitis  History of hernia repair    FAMILY HISTORY:  No pertinent family history in first degree relatives    Social Hx:  Nonsmoker, no drug or alcohol use  Medications and Allergies ReviewedMEDICATIONS  (STANDING):  amphetamine/dextroamphetamine 10 milliGRAM(s) Oral three times a day  cyanocobalamin 1000 MICROGram(s) Oral daily  folic acid 1 milliGRAM(s) Oral daily  heparin  Injectable 5000 Unit(s) SubCutaneous every 8 hours  LORazepam     Tablet 0.5 milliGRAM(s) Oral every 12 hours  thiamine 100 milliGRAM(s) Oral daily     ROS: Pertinent positives in HPI, all other ROS were reviewed and are negative.      Examination:   Vital Signs Last 24 Hrs  T(C): 36.7 (07 Nov 2017 11:07), Max: 36.9 (06 Nov 2017 18:40)  T(F): 98.1 (07 Nov 2017 11:07), Max: 98.4 (06 Nov 2017 18:40)  HR: 95 (07 Nov 2017 11:07) (73 - 122)  BP: 114/72 (07 Nov 2017 11:07) (114/72 - 144/99)  BP(mean): --  RR: 18 (07 Nov 2017 11:07) (17 - 19)  SpO2: 100% (07 Nov 2017 11:07) (97% - 100%)  General: Cooperative, NAD   NECK: supple, no masses  ENT: Normal hearing   Vascular : no carotid bruits,   Lungs: CTAB  Chest: RRR, no murmurs  Extremities: nontender, no edema  Musculoskeletal: no adventitious movements, no joint stiffness  Skin: no rash    Neurological Examination:  NIHSS:  MS: AOx3. Appropriately interactive, normal affect. Speech fluent w/o paraphasic error   CN: VFFTC, PERLL, EOMI, V1-3 sensation intact, face symmetric, hearing intact, palate elevates symmetrically, tongue midline, SCM equal bilaterally  Motor: normal bulk and tone, no tremor, rigidity or bradykinesia.  5/5 all over   Sens: Intact to light touch, pin prick, diminished distal vibration, JPS.    Reflexes: 0/4 all over, downgoing toes b/l  Coord:  No dysmetria, JOSE MIGUEL intact   Gait: not tested    Labs:   Basic Metabolic Panel (11.07.17 @ 06:17)    Sodium, Serum: 138 mmol/L    Potassium, Serum: 3.8 mmol/L    Chloride, Serum: 108 mmol/L    Carbon Dioxide, Serum: 25 mmol/L    Anion Gap, Serum: 5 mmol/L    Blood Urea Nitrogen, Serum: 15 mg/dL    Creatinine, Serum: 0.93 mg/dL    Glucose, Serum: 116 mg/dL    Calcium, Total Serum: 8.4 mg/dL    eGFR if Non : 104: Interpretative comment    Vitamin B12, Serum: 874 pg/mL (11.06.17 @ 19:46)    Imaging: Reviewed  ct:  < from: CT Head No Cont (11.06.17 @ 20:23) >  COMPARISON: CT head August 30, 2007     FINDINGS:       There is no evidence of intraparenchymal or extraaxial hemorrhage.     There is no CT evidence of large vessel acute infarct. No mass effect is   found in the brain.  No evidence of midline shift or herniation pattern.    The ventricles, sulci and basal cisterns appear unremarkable.         Visualized paranasal sinuses are clear.      IMPRESSION:       No acute intracranial findings.    < end of copied text >    A/P:    No IV tpa given because…  Total Critical Care Time spent:

## 2017-11-07 NOTE — PROGRESS NOTE ADULT - PROBLEM SELECTOR PLAN 1
-ataxic gait secondary to B12 deficiency vs nitrous oxide use    -MCV > 100  - CT of head: No acute intracranial findings.  - Urine Toxicology: + benzo and +THC  - neurology consult appreciated - Dr. Resendiz  - f/u with Lumbar Puncture s/p MRI Results -ataxic gait secondary to B12 deficiency vs nitrous oxide use    -MCV > 100  - CT of head: No acute intracranial findings.  - Urine Toxicology: + benzo and +THC  - neurology consult appreciated - Dr. Resendiz  - f/u with Lumbar Puncture s/p MRI Results  - f/u homocysteine and methylmalonic acid levels -ataxic gait secondary to B12 deficiency vs nitrous oxide use    -MCV > 100  - CT of head: No acute intracranial findings.  - Urine Toxicology: + benzo and +THC  - neurology consult appreciated - Dr. Resendiz  - f/u with Lumbar Puncture s/p MRI Results  - f/u homocysteine and methylmalonic acid levels  - cyanocobalamin 1000- microgram IM QD  - thiamine 100mg  - folic acid - 1mg daily  - f/u PT  - tylenol PRN for pain

## 2017-11-07 NOTE — PROGRESS NOTE ADULT - PROBLEM SELECTOR PLAN 2
continue amphetamine/dextroamphetamine 10 milliGRAM(s) Oral three times a day continue amphetamine/dextroamphetamine 10mg TID

## 2017-11-07 NOTE — H&P ADULT - PROBLEM SELECTOR PLAN 3
- Albuterol  - Atrovent  - Advair - Continue home inhalers  - Outpatient Pulmonary for Baseline PFTs - H/o Bronchitis  - Albuterol PRN  - Outpatient Pulmonary recommended for Baseline PFTs - H/o Bronchitis, possible COPD given longstanding tobacco use  - Albuterol PRN  - Outpatient Pulmonary recommended for Baseline PFTs

## 2017-11-07 NOTE — H&P ADULT - HISTORY OF PRESENT ILLNESS
37 y/o man presents with a 4 day history of loss of fine motor control, difficulty walking and writing and patchy numbness and tingling. Patient describes difficulty walking and writing since this past Friday. He has had difficulty dressing himself and tying his shoes. He notes an associated numbness and tingling that occurs in patches from his xyphoid process distally to his feet. Patient reports a 6 week history of inhaling nitrous oxide purchased on the street. He quickly became addicted and symptoms began on Friday when he stopped inhaling nitrous oxide. He denies any muscle aches or spasms, chest pain, SOB, HA, n/v or changes to his bowels. 35 y/o man presents with a 4 day history of loss of fine motor control, difficulty walking and writing and patchy numbness and tingling. Patient describes difficulty walking and writing since this past Friday. He has had difficulty dressing himself and tying his shoes. He notes an associated numbness and tingling that occurs in patches from his xyphoid process distally to his feet. Patient reports a 6 week history of inhaling nitrous oxide purchased on the street. He quickly became addicted and symptoms began on Friday when he stopped inhaling nitrous oxide. Patient recently completed a course of antibiotics for sinus infection approx 2 weeks ago. He denies any muscle aches or spasms, CP, SOB, HA, dizziness, n/v or changes to his bowels. 35 y/o man presents with a 4 day history of loss of fine motor control, difficulty walking and writing and patchy numbness and tingling. Patient describes difficulty walking and writing since this past Friday. He has had difficulty dressing himself and tying his shoes. He notes an associated numbness and tingling that occurs in patches from his xyphoid process distally to his feet. Patient reports a 6 week history of inhaling nitrous oxide purchased on the street. He quickly became addicted and symptoms began on Friday when he stopped inhaling nitrous oxide. Patient recently completed a course of antibiotics for sinus infection approx 2 weeks ago. He denies any muscle aches or spasms, CP, SOB, HA, dizziness, n/v or changes to his bowels.     In the ED, pt was given a STAT dose of Vitamin B12 injection and Folate. 37 y/o man presents with a 4 day history of loss of fine motor control, difficulty walking and writing and patchy numbness and tingling. Patient describes difficulty walking and writing since this past Friday. He has had difficulty dressing himself and tying his shoes. He notes an associated numbness and tingling that occurs in patches from his xyphoid process distally to his feet. Patient reports a 6 week history of inhaling nitrous oxide purchased on the street. He quickly became addicted and symptoms began on Friday when he stopped inhaling nitrous oxide. Patient recently completed a course of antibiotics for sinus infection approx 2 weeks ago. He denies any muscle aches or spasms, CP, SOB, HA, dizziness, n/v or changes to his bowels.     In the ED, pt was given a STAT doses of Vitamin B12 injection, Folate and Thiamine.

## 2017-11-08 LAB
ANION GAP SERPL CALC-SCNC: 5 MMOL/L — SIGNIFICANT CHANGE UP (ref 5–17)
BUN SERPL-MCNC: 19 MG/DL — SIGNIFICANT CHANGE UP (ref 7–23)
CALCIUM SERPL-MCNC: 8.4 MG/DL — LOW (ref 8.5–10.1)
CHLORIDE SERPL-SCNC: 111 MMOL/L — HIGH (ref 96–108)
CO2 SERPL-SCNC: 26 MMOL/L — SIGNIFICANT CHANGE UP (ref 22–31)
CREAT SERPL-MCNC: 0.91 MG/DL — SIGNIFICANT CHANGE UP (ref 0.5–1.3)
GLUCOSE SERPL-MCNC: 89 MG/DL — SIGNIFICANT CHANGE UP (ref 70–99)
HCT VFR BLD CALC: 40.1 % — SIGNIFICANT CHANGE UP (ref 39–50)
HGB BLD-MCNC: 13.4 G/DL — SIGNIFICANT CHANGE UP (ref 13–17)
MCHC RBC-ENTMCNC: 33.4 GM/DL — SIGNIFICANT CHANGE UP (ref 32–36)
MCHC RBC-ENTMCNC: 36.4 PG — HIGH (ref 27–34)
MCV RBC AUTO: 108.9 FL — HIGH (ref 80–100)
PLATELET # BLD AUTO: 368 K/UL — SIGNIFICANT CHANGE UP (ref 150–400)
POTASSIUM SERPL-MCNC: 3.9 MMOL/L — SIGNIFICANT CHANGE UP (ref 3.5–5.3)
POTASSIUM SERPL-SCNC: 3.9 MMOL/L — SIGNIFICANT CHANGE UP (ref 3.5–5.3)
RBC # BLD: 3.68 M/UL — LOW (ref 4.2–5.8)
RBC # FLD: 16.2 % — HIGH (ref 10.3–14.5)
SODIUM SERPL-SCNC: 142 MMOL/L — SIGNIFICANT CHANGE UP (ref 135–145)
WBC # BLD: 11.7 K/UL — HIGH (ref 3.8–10.5)
WBC # FLD AUTO: 11.7 K/UL — HIGH (ref 3.8–10.5)

## 2017-11-08 PROCEDURE — 72142 MRI NECK SPINE W/DYE: CPT | Mod: 26

## 2017-11-08 PROCEDURE — 99232 SBSQ HOSP IP/OBS MODERATE 35: CPT

## 2017-11-08 PROCEDURE — 72158 MRI LUMBAR SPINE W/O & W/DYE: CPT | Mod: 26

## 2017-11-08 RX ORDER — NICOTINE POLACRILEX 2 MG
1 GUM BUCCAL DAILY
Qty: 0 | Refills: 0 | Status: DISCONTINUED | OUTPATIENT
Start: 2017-11-08 | End: 2017-11-09

## 2017-11-08 RX ADMIN — Medication 1 MILLIGRAM(S): at 13:05

## 2017-11-08 RX ADMIN — HEPARIN SODIUM 5000 UNIT(S): 5000 INJECTION INTRAVENOUS; SUBCUTANEOUS at 22:08

## 2017-11-08 RX ADMIN — Medication 100 MILLIGRAM(S): at 13:05

## 2017-11-08 RX ADMIN — Medication 0.5 MILLIGRAM(S): at 05:38

## 2017-11-08 RX ADMIN — PREGABALIN 1000 MICROGRAM(S): 225 CAPSULE ORAL at 13:09

## 2017-11-08 RX ADMIN — DEXTROAMPHETAMINE SACCHARATE, AMPHETAMINE ASPARTATE, DEXTROAMPHETAMINE SULFATE AND AMPHETAMINE SULFATE 10 MILLIGRAM(S): 1.875; 1.875; 1.875; 1.875 TABLET ORAL at 05:39

## 2017-11-08 RX ADMIN — DEXTROAMPHETAMINE SACCHARATE, AMPHETAMINE ASPARTATE, DEXTROAMPHETAMINE SULFATE AND AMPHETAMINE SULFATE 10 MILLIGRAM(S): 1.875; 1.875; 1.875; 1.875 TABLET ORAL at 14:38

## 2017-11-08 RX ADMIN — HEPARIN SODIUM 5000 UNIT(S): 5000 INJECTION INTRAVENOUS; SUBCUTANEOUS at 05:39

## 2017-11-08 RX ADMIN — DEXTROAMPHETAMINE SACCHARATE, AMPHETAMINE ASPARTATE, DEXTROAMPHETAMINE SULFATE AND AMPHETAMINE SULFATE 10 MILLIGRAM(S): 1.875; 1.875; 1.875; 1.875 TABLET ORAL at 22:08

## 2017-11-08 RX ADMIN — Medication 0.5 MILLIGRAM(S): at 17:15

## 2017-11-08 RX ADMIN — Medication 1 PATCH: at 22:08

## 2017-11-08 NOTE — PROGRESS NOTE ADULT - PROBLEM SELECTOR PLAN 4
-smoking cessation counseled.   -start patient on nicotine patch -smoking cessation counseled.   -start patient on nicotine patch.

## 2017-11-08 NOTE — PROGRESS NOTE ADULT - SUBJECTIVE AND OBJECTIVE BOX
37 y/o man with PMHx of  c/o of numbness and tingling in b/l lower extremities ascending from his lower extremities to his upper extremities for past 3-4 days. Pt reports that the symptoms come and ago and are beginning to be more constant and affecting his daily functioning. He describes the numbness and tingling as a pinching sensation. Pt reports he has a history of taking nitrous oxide which he purchased on the street for past 2 months. He states that he never had this before and is concerned that the Nitrix Oxide  could have been causing his symptoms. Pt mentions nothing makes his symptoms or worse. However, he is finding it very difficult to  walk and write and tie his shoes and noticed he is having more difficulty walking. He also reports sometimes the numbness and tingling radiates up to his chest but denies any chest pain. Pt denies any trauma, pain in his upper or lower extremities, fever, headache, dizziness,  neck pain, body aches, chills, sob, wheezing, chest congestion, palpitations, nausea, vomiting, abdominal pain, diarrhea or constipation. In addition, pt denies any recent travel, his immunizations being up to date, family history or any sick contacts. During Pt ED visit he received a STAT dose of vitamin B12 injection, folate and thiamine.    11/8/2017: Pt  was seen at bedside and is resting comfortably  Pt had MRI of the head, cervical spine and thoracic spine done, but couldn't tolerate it anymore due to anxiety and didn't get the lumbar spine MRI completed. Pt is willing to try again today for MRI of cervical spine with contrast and MRI of Lumbar Spine. Neurology recommends LP fro tomorrow. Pt has no other complaints.     REVIEW OF SYSTEMS:  General: NAD, hemodynamically stable, (-)  fever, (-) chills, (-) weakness  HEENT:  Eyes:  No visual loss, blurred vision, double vision or yellow sclerae. Ears, Nose, Throat:  No hearing loss, sneezing, congestion, runny nose or sore throat.  SKIN:  No rash or itching.  CARDIOVASCULAR:  No chest pain, chest pressure or chest discomfort. No palpitations or edema.  RESPIRATORY:  No shortness of breath, cough or sputum.  GASTROINTESTINAL:  No anorexia, nausea, vomiting or diarrhea. No abdominal pain or blood.  NEUROLOGICAL:  +  ataxia, + numbness or tingling in the upper and lower b/l extremities. No change in bowel or bladder control.  MUSCULOSKELETAL:  No muscle, back pain, joint pain or stiffness.  HEMATOLOGIC:  No anemia, bleeding or bruising.  LYMPHATICS:  No enlarged nodes.   ENDOCRINOLOGIC:  No reports of sweating, cold or heat intolerance. No polyuria or polydipsia.      ICU Vital Signs Last 24 Hrs  T(C): 36.6 (08 Nov 2017 10:53), Max: 36.8 (07 Nov 2017 21:35)  T(F): 97.9 (08 Nov 2017 10:53), Max: 98.2 (07 Nov 2017 21:35)  HR: 80 (08 Nov 2017 10:53) (80 - 84)  BP: 112/66 (08 Nov 2017 10:53) (112/66 - 117/61)  RR: 18 (08 Nov 2017 10:53) (18 - 18)  SpO2: 98% (08 Nov 2017 10:53) (98% - 98%)    PHYSICAL EXAM:  GENERAL: NAD, well-groomed, well-developed  HEAD:  NC/AT  EYES: EOMI, PERRLA, no scleral icterus  HEENT: Moist mucous membranes  NECK: Supple, No JVD  CNS:  Alert & Oriented X3, Motor Strength 5/5  upper and 4/5 lower extremities; + Ataxic Gait,  Sensation intact in upper and lower extremities.   LUNG: Clear to auscultation bilaterally; No rales, rhonchi, wheezing, or rubs  HEART: RRR; No murmurs, rubs, or gallops  ABDOMEN: +BS, ST/ND/NT  GENITOURINARY- Voiding, Bladder not distended  EXTREMITIES:  2+ Peripheral Pulses, No clubbing, cyanosis, or edema  MUSCULOSKELETAL Joints normal ROM, no Muscle or joint tenderness    MEDICATIONS  (STANDING):  amphetamine/dextroamphetamine 10 milliGRAM(s) Oral three times a day  cyanocobalamin Injectable 1000 MICROGram(s) IntraMuscular daily  folic acid 1 milliGRAM(s) Oral daily  heparin  Injectable 5000 Unit(s) SubCutaneous every 8 hours  LORazepam     Tablet 0.5 milliGRAM(s) Oral every 12 hours  nicotine -  14 mG/24Hr(s) Patch 1 patch Transdermal daily  thiamine 100 milliGRAM(s) Oral daily    MEDICATIONS  (PRN):  acetaminophen   Tablet. 650 milliGRAM(s) Oral every 6 hours PRN Mild Pain (1 - 3)  ALBUTerol    90 MICROgram(s) HFA Inhaler 2 Puff(s) Inhalation every 6 hours PRN Shortness of Breath and/or Wheezing        Labs             13.4   11.7  )-----------( 368      ( 08 Nov 2017 05:24 )             40.1     08 Nov 2017 05:24    142    |  111    |  19     ----------------------------<  89     3.9     |  26     |  0.91     Ca    8.4        08 Nov 2017 05:24      < from: CT Head No Cont (11.06.17 @ 20:23) >  IMPRESSION:     No acute intracranial findings.    < from: MR Thoracic Spine No Cont (11.07.17 @ 21:13) >  IMPRESSION:      Abnormal T2 hyperintense signal within the dorsal columns of the cervical   cord at the C3-C5 levels, with possible additional subtle T2   hyperintensity at the C7 level. No abnormal signal within the thoracic   cord.  Differential includes subacute combined degeneration of the cord (vitamin   B12 deficiency), and less likely demyelinating disease, inflammatory, or   infectious process. IV contrast would be helpful for further evaluation.      Mild degenerative changes at C2/C3, C3/C4, C5/C6, and C6/C7.     No significant degenerative disc change at the thoracic spine.    < end of copied text >    < from: MR Cervical Spine No Cont (11.07.17 @ 21:11) >  IMPRESSION:    Abnormal T2 hyperintense signal within the dorsal columns of the cervical   cord at the C3-C5 levels, with possible additional subtle T2   hyperintensity at the C7 level. No abnormal signal within the thoracic   cord.  Differential includes subacute combined degeneration of the cord (vitamin   B12 deficiency), and less likely demyelinating disease, inflammatory, or   infectious process. IV contrast would be helpful for further evaluation.    Mild degenerative changes at C2/C3, C3/C4, C5/C6, and C6/C7.   No significant degenerative disc change at the thoracic spine.  < end of copied text >  < from: MR Head No Cont (11.07.17 @ 21:09) >    IMPRESSION:     Normal MRI of the brain. Peripheral mucosal inflammation in the frontal   ethmoid, sphenoid and maxillary sinuses.  < end of copied text >

## 2017-11-08 NOTE — PROGRESS NOTE ADULT - PROBLEM SELECTOR PLAN 1
-ataxic gait secondary to B12 deficiency vs nitrous oxide use    -MCV > 100  - CT of head: No acute intracranial findings.  - Urine Toxicology: + benzo and +THC  - neurology consult appreciated - Dr. Resendiz  -f/u LP and CSF analysis.   - f/u MRI cervical spine with theron and lumbar spine MRI results  -  homocysteine level 22.4  - f/u methylmalonic acid levels  - cyanocobalamin 1000- microgram IM QD  - thiamine 100mg  - folic acid - 1mg daily  - f/u PT  - Tylenol PRN for pain -ataxic gait secondary to B12 deficiency vs nitrous oxide use    -MCV > 100  - CT of head: No acute intracranial findings.  - Urine Toxicology: + benzo and +THC  - neurology consult appreciated - Dr. Resendiz  -f/u LP and CSF analysis.   - f/u MRI cervical spine with theron and lumbar spine MRI results  -  homocysteine level 22.4  - f/u methylmalonic acid levels  - cyanocobalamin 1000- microgram IM QD  - thiamine 100mg  - folic acid - 1mg daily  - f/u PT  - Tylenol PRN for pain  - MRI of head: Normal MRI of the brain. Peripheral mucosal inflammation in the frontal ethmoid, sphenoid and maxillary sinuses.  - MRI of cervical spine: Abnormal T2 hyperintense signal within the dorsal columns of the cervical   cord at the C3-C5 levels, with possible additional subtle T2   hyperintensity at the C7 level. No abnormal signal within the thoracic   cord.  MRI of

## 2017-11-08 NOTE — PHYSICAL THERAPY INITIAL EVALUATION ADULT - DIAGNOSIS, PT EVAL
Ataxic gait secondary to B12 deficiency vs nitrous oxide use, c/o of numbness and tingling in b/l lower extremities ascending from his lower extremities to his upper extremities for past 3-4 days,unsteady gait, no improvement in tingling and numbness

## 2017-11-08 NOTE — CHART NOTE - NSCHARTNOTEFT_GEN_A_CORE
hospital course reviewed with patient and family.     patient would like nicotine patch ordered    would like lp done by Dr Resendiz under local anesthesia in am will speak to neurology tomorrow and relay message.     awaiting mri results hospital course reviewed with patient and family.     patient would like nicotine patch ordered    would like lp done by Dr Resendiz under local anesthesia in am will speak to neurology tomorrow and relay message.     awaiting mri results    all questions answered

## 2017-11-08 NOTE — PROGRESS NOTE ADULT - PROBLEM SELECTOR PLAN 1
consider LP for CSF analysis. fluid for cell count, total protein, glucose, oligoclonal bands, myelin basic protein, SPEP.   Repeat MRI of cervical spine w theron.  PT.

## 2017-11-08 NOTE — PHYSICAL THERAPY INITIAL EVALUATION ADULT - PERTINENT HX OF CURRENT PROBLEM, REHAB EVAL
c/o of numbness and tingling in b/l lower extremities ascending from his lower extremities to his upper extremities for past 3-4 days., constant and affecting his daily functioning.,Pt reports he has a history of taking nitrous oxide which he purchased on the street for past 2 months.,he is finding it very difficult to  walk and write and tie his shoes and noticed he is having more difficulty walking.,During Pt ED visit he received a STAT dose of vitamin B12 injection, folate and thiamine.

## 2017-11-08 NOTE — PROGRESS NOTE ADULT - SUBJECTIVE AND OBJECTIVE BOX
Pt has been seen and examined with FP resident, resident supervised agree with a/p       Patient is a 36y old  Male who presents with a chief complaint of Numbness/Tingling/ Difficulty walking & writing (07 Nov 2017 01:05)        HPI:  37 y/o man presents with a 4 day history of loss of fine motor control, difficulty walking and writing and patchy numbness and tingling. Patient describes difficulty walking and writing since this past Friday. He has had difficulty dressing himself and tying his shoes. He notes an associated numbness and tingling that occurs in patches from his xyphoid process distally to his feet. Patient reports a 6 week history of inhaling nitrous oxide purchased on the street. He quickly became addicted and symptoms began on Friday when he stopped inhaling nitrous oxide. Patient recently completed a course of antibiotics for sinus infection approx 2 weeks ago. He denies any muscle aches or spasms, CP, SOB, HA, dizziness, n/v or changes to his bowels.       PHYSICAL EXAM:    general- comfortable   -rs-aeeb,cta  -cvs-s1s2 normal   -p/a-soft,bs+  -extremity- no asymmetrical swelling noted   -cns- decreased sensation noted in b/l leg       A/P      #possible SACD due to nitrous oxide inhalation   -counselled not to use it   -ct vitamin b12 IM   -repeat MRI with contrast to follow    #dvt pr     #above discussed with pt and family at bedside, all questions have been answered

## 2017-11-08 NOTE — PROGRESS NOTE ADULT - SUBJECTIVE AND OBJECTIVE BOX
HPI:  35 y/o man presents with a 4 day history of loss of fine motor control, difficulty walking and writing and patchy numbness and tingling. Patient describes difficulty walking and writing since this past Friday. He has had difficulty dressing himself and tying his shoes. He notes an associated numbness and tingling that occurs in patches from his xyphoid process distally to his feet. Patient reports a 6 week history of inhaling nitrous oxide purchased on the street.    MEDICATIONS  (STANDING):  amphetamine/dextroamphetamine 10 milliGRAM(s) Oral three times a day  cyanocobalamin Injectable 1000 MICROGram(s) IntraMuscular daily  folic acid 1 milliGRAM(s) Oral daily  heparin  Injectable 5000 Unit(s) SubCutaneous every 8 hours  LORazepam     Tablet 0.5 milliGRAM(s) Oral every 12 hours  thiamine 100 milliGRAM(s) Oral daily    MEDICATIONS  (PRN):  acetaminophen   Tablet. 650 milliGRAM(s) Oral every 6 hours PRN Mild Pain (1 - 3)  ALBUTerol    90 MICROgram(s) HFA Inhaler 2 Puff(s) Inhalation every 6 hours PRN Shortness of Breath and/or Wheezing      Neurological Exam:  HF: Patient is alert and oriented x 3. There is no aphasia or dysarthria. Follows complex commands.   CN: Vision is intact to confrontation. Pupils are equal and reactive. Extra ocular muscles are intact. There is no facial droop or asymmetry. Tongue is midline. Sensation is intact in the face. Other CN II-XII are intact.  Motor: motor examination all muscles are 5/5 and there is no pronator drift.   Sensory: intact to pinprick and touch, reduced JPS.   DTR: 3/4 all 4 extremities. Babinski is negative bilateral.  Co-ord:  Finger to finger to nose is intact.   Gait/balance: wide based gait.   Romberg is positive.     Lab:  Homocysteine, Serum: 22.4 umol/L (11.07.17 @ 12:31)    Vitamin B12, Serum: 874 pg/mL (11.06.17 @ 19:46)    Radiology report:  MRI brain:  < from: MR Head No Cont (11.07.17 @ 21:09) >  IMPRESSION:       Normal MRI of the brain. Peripheral mucosal inflammation in the frontal   ethmoid, sphenoid and maxillary sinuses.    Mri spine:  < from: MR Cervical Spine No Cont (11.07.17 @ 21:11) >  IMPRESSION:  Abnormal T2 hyperintense signal within the dorsal columns of the cervical   cord at the C3-C5 levels, with possible additional subtle T2   hyperintensity at the C7 level. No abnormal signal within the thoracic   cord.  Differential includes subacute combined degeneration of the cord (vitamin   B12 deficiency), and less likely demyelinating disease, inflammatory, or   infectious process. IV contrast would be helpful for further evaluation.      Mild degenerative changes at C2/C3, C3/C4, C5/C6, and C6/C7.     No significant degenerative disc change at the thoracic spine.

## 2017-11-09 ENCOUNTER — RESULT REVIEW (OUTPATIENT)
Age: 37
End: 2017-11-09

## 2017-11-09 ENCOUNTER — TRANSCRIPTION ENCOUNTER (OUTPATIENT)
Age: 37
End: 2017-11-09

## 2017-11-09 VITALS
SYSTOLIC BLOOD PRESSURE: 123 MMHG | DIASTOLIC BLOOD PRESSURE: 67 MMHG | OXYGEN SATURATION: 99 % | RESPIRATION RATE: 18 BRPM | HEART RATE: 87 BPM | TEMPERATURE: 98 F

## 2017-11-09 LAB
ANION GAP SERPL CALC-SCNC: 3 MMOL/L — LOW (ref 5–17)
APPEARANCE CSF: CLEAR — SIGNIFICANT CHANGE UP
BUN SERPL-MCNC: 19 MG/DL — SIGNIFICANT CHANGE UP (ref 7–23)
CALCIUM SERPL-MCNC: 8.5 MG/DL — SIGNIFICANT CHANGE UP (ref 8.5–10.1)
CHLORIDE SERPL-SCNC: 109 MMOL/L — HIGH (ref 96–108)
CO2 SERPL-SCNC: 27 MMOL/L — SIGNIFICANT CHANGE UP (ref 22–31)
COLOR CSF: SIGNIFICANT CHANGE UP
CREAT SERPL-MCNC: 0.87 MG/DL — SIGNIFICANT CHANGE UP (ref 0.5–1.3)
GLUCOSE CSF-MCNC: 59 MG/DL — SIGNIFICANT CHANGE UP (ref 40–70)
GLUCOSE SERPL-MCNC: 110 MG/DL — HIGH (ref 70–99)
GRAM STN FLD: SIGNIFICANT CHANGE UP
GRAM STN FLD: SIGNIFICANT CHANGE UP
HCT VFR BLD CALC: 37.7 % — LOW (ref 39–50)
HGB BLD-MCNC: 12.5 G/DL — LOW (ref 13–17)
LABORATORY COMMENT REPORT: SIGNIFICANT CHANGE UP
MCHC RBC-ENTMCNC: 33.3 GM/DL — SIGNIFICANT CHANGE UP (ref 32–36)
MCHC RBC-ENTMCNC: 36.2 PG — HIGH (ref 27–34)
MCV RBC AUTO: 108.8 FL — HIGH (ref 80–100)
NEUTROPHILS # CSF: SIGNIFICANT CHANGE UP (ref 0–6)
NIGHT BLUE STAIN TISS: SIGNIFICANT CHANGE UP
NRBC NFR CSF: <1 — SIGNIFICANT CHANGE UP (ref 0–5)
PLATELET # BLD AUTO: 352 K/UL — SIGNIFICANT CHANGE UP (ref 150–400)
POTASSIUM SERPL-MCNC: 3.9 MMOL/L — SIGNIFICANT CHANGE UP (ref 3.5–5.3)
POTASSIUM SERPL-SCNC: 3.9 MMOL/L — SIGNIFICANT CHANGE UP (ref 3.5–5.3)
PROT CSF-MCNC: 49 MG/DL — HIGH (ref 15–45)
RBC # BLD: 3.46 M/UL — LOW (ref 4.2–5.8)
RBC # CSF: 0 /UL — SIGNIFICANT CHANGE UP (ref 0–0)
RBC # FLD: 16.2 % — HIGH (ref 10.3–14.5)
SODIUM SERPL-SCNC: 139 MMOL/L — SIGNIFICANT CHANGE UP (ref 135–145)
SOURCE HSV 1/2: SIGNIFICANT CHANGE UP
SPECIMEN SOURCE: SIGNIFICANT CHANGE UP
SPECIMEN SOURCE: SIGNIFICANT CHANGE UP
TUBE TYPE: SIGNIFICANT CHANGE UP
WBC # BLD: 10.1 K/UL — SIGNIFICANT CHANGE UP (ref 3.8–10.5)
WBC # FLD AUTO: 10.1 K/UL — SIGNIFICANT CHANGE UP (ref 3.8–10.5)

## 2017-11-09 PROCEDURE — 99232 SBSQ HOSP IP/OBS MODERATE 35: CPT

## 2017-11-09 PROCEDURE — 88108 CYTOPATH CONCENTRATE TECH: CPT | Mod: 26

## 2017-11-09 RX ORDER — LIDOCAINE HCL 20 MG/ML
5 VIAL (ML) INJECTION ONCE
Qty: 0 | Refills: 0 | Status: DISCONTINUED | OUTPATIENT
Start: 2017-11-09 | End: 2017-11-09

## 2017-11-09 RX ORDER — PREGABALIN 225 MG/1
1 CAPSULE ORAL
Qty: 1 | Refills: 0 | OUTPATIENT
Start: 2017-11-09 | End: 2017-12-09

## 2017-11-09 RX ORDER — FOLIC ACID 0.8 MG
1 TABLET ORAL
Qty: 15 | Refills: 0
Start: 2017-11-09 | End: 2017-11-24

## 2017-11-09 RX ORDER — FOLIC ACID 0.8 MG
1 TABLET ORAL
Qty: 0 | Refills: 0 | COMMUNITY
Start: 2017-11-09

## 2017-11-09 RX ORDER — THIAMINE MONONITRATE (VIT B1) 100 MG
1 TABLET ORAL
Qty: 0 | Refills: 0 | COMMUNITY
Start: 2017-11-09

## 2017-11-09 RX ORDER — NICOTINE POLACRILEX 2 MG
1 GUM BUCCAL
Qty: 15 | Refills: 0
Start: 2017-11-09 | End: 2017-11-24

## 2017-11-09 RX ORDER — PREGABALIN 225 MG/1
1 CAPSULE ORAL
Qty: 1 | Refills: 0
Start: 2017-11-09 | End: 2017-12-09

## 2017-11-09 RX ORDER — THIAMINE MONONITRATE (VIT B1) 100 MG
1 TABLET ORAL
Qty: 15 | Refills: 0
Start: 2017-11-09 | End: 2017-11-24

## 2017-11-09 RX ORDER — FLUTICASONE PROPIONATE AND SALMETEROL 50; 250 UG/1; UG/1
0 POWDER ORAL; RESPIRATORY (INHALATION)
Qty: 60 | Refills: 0 | COMMUNITY

## 2017-11-09 RX ADMIN — Medication 0.5 MILLIGRAM(S): at 12:34

## 2017-11-09 RX ADMIN — Medication 0.5 MILLIGRAM(S): at 05:46

## 2017-11-09 RX ADMIN — Medication 1 MILLIGRAM(S): at 11:50

## 2017-11-09 RX ADMIN — Medication 1 PATCH: at 11:50

## 2017-11-09 RX ADMIN — Medication 100 MILLIGRAM(S): at 11:50

## 2017-11-09 RX ADMIN — DEXTROAMPHETAMINE SACCHARATE, AMPHETAMINE ASPARTATE, DEXTROAMPHETAMINE SULFATE AND AMPHETAMINE SULFATE 10 MILLIGRAM(S): 1.875; 1.875; 1.875; 1.875 TABLET ORAL at 05:46

## 2017-11-09 NOTE — DISCHARGE NOTE ADULT - HOSPITAL COURSE
37 y/o man with PMHx of  c/o of numbness and tingling in b/l lower extremities ascending from his lower extremities to his upper extremities for past 3-4 days. Pt reports that the symptoms come and ago and are beginning to be more constant and affecting his daily functioning. He describes the numbness and tingling as a pinching sensation. Pt reports he has a history of taking nitrous oxide which he purchased on the street for past 2 months. He states that he never had this before and is concerned that the Nitrix Oxide  could have been causing his symptoms. Pt mentions nothing makes his symptoms or worse. However, he is finding it very difficult to  walk and write and tie his shoes and noticed he is having more difficulty walking. He also reports sometimes the numbness and tingling radiates up to his chest but denies any chest pain. Pt denies any trauma, pain in his upper or lower extremities, fever, headache, dizziness,  neck pain, body aches, chills, sob, wheezing, chest congestion, palpitations, nausea, vomiting, abdominal pain, diarrhea or constipation. In addition, pt denies any recent travel, his immunizations being up to date, family history or any sick contacts. During Pt ED visit he received a STAT dose of vitamin B12 injection, folate and thiamine.    Throughout Pt admission pt was seen by family medicine team and neurology for his numbness and tingling and ataxic gait. Pt was treated with B12, thiamine and folic acid. In addition, pt had MRI of cervical, lumbar and thoracic area, along with CT of the head without contrast. In addition, pt received a spinal tap by Dr. Resendiz Pt was recommended to for outpatient Physical Thearpy. Pt is stable for discharge and PCP office Dr. Carranza was contacted about pt admission and injection of B12 shots.       < from: CT Head No Cont (11.06.17 @ 20:23) >  IMPRESSION:     No acute intracranial findings.    < from: MR Thoracic Spine No Cont (11.07.17 @ 21:13) >  IMPRESSION:      Abnormal T2 hyperintense signal within the dorsal columns of the cervical   cord at the C3-C5 levels, with possible additional subtle T2   hyperintensity at the C7 level. No abnormal signal within the thoracic   cord.  Differential includes subacute combined degeneration of the cord (vitamin   B12 deficiency), and less likely demyelinating disease, inflammatory, or   infectious process. IV contrast would be helpful for further evaluation.      Mild degenerative changes at C2/C3, C3/C4, C5/C6, and C6/C7.     No significant degenerative disc change at the thoracic spine.    < end of copied text >    < from: MR Cervical Spine No Cont (11.07.17 @ 21:11) >  IMPRESSION:    Abnormal T2 hyperintense signal within the dorsal columns of the cervical   cord at the C3-C5 levels, with possible additional subtle T2   hyperintensity at the C7 level. No abnormal signal within the thoracic   cord.  Differential includes subacute combined degeneration of the cord (vitamin   B12 deficiency), and less likely demyelinating disease, inflammatory, or   infectious process. IV contrast would be helpful for further evaluation.    Mild degenerative changes at C2/C3, C3/C4, C5/C6, and C6/C7.   No significant degenerative disc change at the thoracic spine.  < end of copied text >  < from: MR Head No Cont (11.07.17 @ 21:09) >    IMPRESSION:     Normal MRI of the brain. Peripheral mucosal inflammation in the frontal   ethmoid, sphenoid and maxillary sinuses.  < end of copied text > 35 y/o man with PMHx of  c/o of numbness and tingling in b/l lower extremities ascending from his lower extremities to his upper extremities for past 3-4 days. Pt reports that the symptoms come and ago and are beginning to be more constant and affecting his daily functioning. He describes the numbness and tingling as a pinching sensation. Pt reports he has a history of taking nitrous oxide which he purchased on the street for past 2 months. He states that he never had this before and is concerned that the Nitrix Oxide  could have been causing his symptoms. Pt mentions nothing makes his symptoms or worse. However, he is finding it very difficult to  walk and write and tie his shoes and noticed he is having more difficulty walking. He also reports sometimes the numbness and tingling radiates up to his chest but denies any chest pain. Pt denies any trauma, pain in his upper or lower extremities, fever, headache, dizziness,  neck pain, body aches, chills, sob, wheezing, chest congestion, palpitations, nausea, vomiting, abdominal pain, diarrhea or constipation. In addition, pt denies any recent travel, his immunizations being up to date, family history or any sick contacts. During Pt ED visit he received a STAT dose of vitamin B12 injection, folate and thiamine.    Throughout Pt admission pt was seen by family medicine team and neurology for his numbness and tingling and ataxic gait. Pt was treated with B12, thiamine and folic acid. In addition, pt had MRI of cervical, lumbar and thoracic area, along with CT of the head without contrast. In addition, pt received a spinal tap by Dr. Resendiz Pt was recommended to for Physical Therapy for which he opted for outpatient. Pt is stable for discharge and PCP office Dr. Carranza was contacted about pt admission and injection of B12 shots.     Radiology  MR C-spine IV contrast 11/8  IMPRESSION:     Severe motion artifact on the sagittal postcontrast weighted sequence.   There is moderate motion artifact on the axial postcontrast weighted   sequence. No definite abnormal signal is identified within the cord,   however evaluation is somewhat limited.    MRI Lumbar Spine 11/8  IMPRESSION: No abnormal enhancement within the lumbar spine. No   significant disc bulges, foraminal narrowing, or spinal canal stenosis   within the lumbar spine.    MRI Thoracic C-spine 11/7  IMPRESSION:    Abnormal T2 hyperintense signal within the dorsal columns of the cervical   cord at the C3-C5 levels, with possible additional subtle T2   hyperintensity at the C7 level. No abnormal signal within the thoracic   cord.  Differential includes subacute combined degeneration of the cord (vitamin   B12 deficiency), and less likely demyelinating disease, inflammatory, or   infectious process. IV contrast would be helpful for further evaluation.    Mild degenerative changes at C2/C3, C3/C4, C5/C6, and C6/C7.   No significant degenerative disc change at the thoracic spine.    MRI Head 11/7  IMPRESSION:     Normal MRI of the brain. Peripheral mucosal inflammation in the frontal   ethmoid, sphenoid and maxillary sinuses.    CT Head 11/6  IMPRESSION:     No acute intracranial findings.

## 2017-11-09 NOTE — PROGRESS NOTE ADULT - PROBLEM SELECTOR PLAN 3
ALBUTerol Inhalation every 6 hours PRN Shortness of Breath and/or Wheezing

## 2017-11-09 NOTE — DISCHARGE NOTE ADULT - MEDICATION SUMMARY - MEDICATIONS TO TAKE
I will START or STAY ON the medications listed below when I get home from the hospital:    PROAIR HFA 90 MCG INHALER  -- Indication: For sob    ATROVENT HFA INHALER  -- Indication: For sob    Advair Diskus 250 mcg-50 mcg inhalation powder  -- 1 puff(s) inhaled 2 times a day  -- Indication: For sob    DEXTROAMP-AMPHETAMIN 10 MG TAB  -- Indication: For ADHD (attention deficit hyperactivity disorder)    nicotine 14 mg/24 hr transdermal film, extended release  -- 1 patch by transdermal patch once a day   -- Indication: For smoking cessation    thiamine 100 mg oral tablet  -- 1 tab(s) by mouth once a day  -- Indication: For vitamin    cyanocobalamin 1000 mcg/mL injectable solution  -- 1 microgram(s) injectable once a day ; will need to follow up with PMD for this   -- Indication: For vitamin    folic acid 1 mg oral tablet  -- 1 tab(s) by mouth once a day  -- Indication: For vitamin

## 2017-11-09 NOTE — PROGRESS NOTE ADULT - PROBLEM SELECTOR PLAN 1
-ataxic gait secondary to B12 deficiency vs nitrous oxide use    - possible discharge today. Pt will like to follow up with Dr. eSgal as outpatient s/p lumbar puncture,.   -MCV > 100  - CT of head: No acute intracranial findings.  - Urine Toxicology: + benzo and +THC  - neurology consult appreciated - Dr. Resendiz  -f/u LP and CSF analysis - pt will follow up as outpatient with dr. resendiz  - f/u  lumbar spine MRI results  -  homocysteine level 22.4  - f/u methylmalonic acid levels  - cyanocobalamin 1000- microgram IM QD  - thiamine 100mg  - folic acid - 1mg daily  - f/u PT  - Tylenol PRN for pain  - MRI of head: Normal MRI of the brain. Peripheral mucosal inflammation in the frontal ethmoid, sphenoid and maxillary sinuses.  - MRI of cervical spine: Abnormal T2 hyperintense signal within the dorsal columns of the cervical   cord at the C3-C5 levels, with possible additional subtle T2   hyperintensity at the C7 level. No abnormal signal within the thoracic   cord. -ataxic gait secondary to B12 deficiency vs nitrous oxide use    - possible discharge today. Pt will like to follow up with Dr. eSgal as outpatient s/p lumbar puncture  -MCV > 100  - CT of head: No acute intracranial findings.  - Urine Toxicology: + benzo and +THC  - neurology consult appreciated - Dr. Resendiz  -f/u LP and CSF analysis - pt will follow up as outpatient with dr. resendiz  - f/u  lumbar spine MRI results  -  homocysteine level 22.4  - f/u methylmalonic acid levels  - cyanocobalamin 1000- microgram IM QD  - thiamine 100mg  - folic acid - 1mg daily  - f/u PT  - Tylenol PRN for pain  - MRI of head: Normal MRI of the brain. Peripheral mucosal inflammation in the frontal ethmoid, sphenoid and maxillary sinuses.  - MRI of cervical spine: Abnormal T2 hyperintense signal within the dorsal columns of the cervical   cord at the C3-C5 levels, with possible additional subtle T2   hyperintensity at the C7 level. No abnormal signal within the thoracic   cord.

## 2017-11-09 NOTE — DISCHARGE NOTE ADULT - MEDICATION SUMMARY - MEDICATIONS TO STOP TAKING
I will STOP taking the medications listed below when I get home from the hospital:    LORAZEPAM 0.5 MG TABLET  -- 1 tab(s) by mouth every 12 hours

## 2017-11-09 NOTE — DISCHARGE NOTE ADULT - ADDITIONAL INSTRUCTIONS
follow up with pmd neurology and psych    take medications as prescribed    if you have fever>100.3, worsening walking, shortness of breath, chest pain, headache with vision changes, abdominal pain come to ED or call PMD immediately. will need rehab with physical therapy

## 2017-11-09 NOTE — DISCHARGE NOTE ADULT - PROVIDER TOKENS
TOKEN:'[id number]',TOKEN:'[id number]' TOKEN:'[id number]:'[id number]:'[id number]'

## 2017-11-09 NOTE — DISCHARGE NOTE ADULT - CARE PLAN
Principal Discharge DX:	Numbness and tingling of both lower extremities  Goal:	prevent further complications  Instructions for follow-up, activity and diet:	follow up with Dr. Resendiz for further test results and management  follow up with Out-Patient.  continue b12, folate and thiamine treatment  - b12 injection must be done by physician or medical staff  Secondary Diagnosis:	ADHD (attention deficit hyperactivity disorder)  Goal:	stay healthy  Instructions for follow-up, activity and diet:	continue Adderall 10mg  Secondary Diagnosis:	Ataxic gait  Goal:	improve walking  Instructions for follow-up, activity and diet:	recommend outpatient physical therapy  Secondary Diagnosis:	Nicotine dependence  Goal:	smoking cessation  Instructions for follow-up, activity and diet:	recommend smoking cessation   discussed importance of smoking cessation and complications Principal Discharge DX:	Numbness and tingling of both lower extremities  Goal:	prevent further complications  Instructions for follow-up, activity and diet:	- most likely secondary to Subacute combined degeneration secondary to b12 defc  follow up with Dr. Resendiz for further test results and management  follow up with Out-Patient with pmd   continue b12, folate and thiamine treatment  - b12 injection must be done by physician or medical staff  Secondary Diagnosis:	ADHD (attention deficit hyperactivity disorder)  Goal:	stay healthy  Instructions for follow-up, activity and diet:	continue Adderall   follow up with pmd and psych  Secondary Diagnosis:	Ataxic gait  Goal:	improve walking  Instructions for follow-up, activity and diet:	recommend outpatient physical therapy  patient declined inpatient rehab eval  Secondary Diagnosis:	Nicotine dependence  Goal:	smoking cessation  Instructions for follow-up, activity and diet:	recommend smoking cessation   discussed importance of smoking cessation and complications  continue use of nicotine patch

## 2017-11-09 NOTE — CHART NOTE - NSCHARTNOTEFT_GEN_A_CORE
called [facility] pharmacy [phone] spoke Saroj pharmacist -> confirmed no rx for b12 sent as patient will need to follow with pmd to get this

## 2017-11-09 NOTE — DISCHARGE NOTE ADULT - PLAN OF CARE
prevent further complications follow up with Dr. Resendiz for further test results and management  follow up with Out-Patient.  continue b12, folate and thiamine treatment  - b12 injection must be done by physician or medical staff stay healthy continue Adderall 10mg improve walking recommend outpatient physical therapy smoking cessation recommend smoking cessation   discussed importance of smoking cessation and complications - most likely secondary to Subacute combined degeneration secondary to b12 defc  follow up with Dr. Resendiz for further test results and management  follow up with Out-Patient with pmd   continue b12, folate and thiamine treatment  - b12 injection must be done by physician or medical staff continue Adderall   follow up with pmd and psych recommend outpatient physical therapy  patient declined inpatient rehab eval recommend smoking cessation   discussed importance of smoking cessation and complications  continue use of nicotine patch

## 2017-11-09 NOTE — CDI QUERY NOTE - NSCDIOTHERTXTBX_GEN_ALL_CORE_HH
Documentation on admission of ataxic gait and peripheral neuropathy. Pt. stated he is a smoker, uses nitrous oxide daily x 6 weeks, Cocaine use on occasion, on Adderall for ADHD and Marijuana use. Do the clinical indications stated indicate :  A) probable Nitrous Oxide poisoning  B) suspected Vitamin B 12 deficiency  C) Unable to determine  D) Other ( Please specify condition)

## 2017-11-09 NOTE — CHART NOTE - NSCHARTNOTEFT_GEN_A_CORE
called by Dr Navarrete neurology outpatient for patient status update and outpatient findings such as physical exam as he sent patient in from office.     patient hospital course and findings d/w Dr Navarrete. Patient can be seen by Dr Navarrete or Dr Resendiz (for continuity) upon d/c. Awaiting LP by neurology per patient preference prior to d/c.

## 2017-11-09 NOTE — PROGRESS NOTE ADULT - SUBJECTIVE AND OBJECTIVE BOX
37 y/o man with PMHx of  c/o of numbness and tingling in b/l lower extremities ascending from his lower extremities to his upper extremities for past 3-4 days. Pt reports that the symptoms come and ago and are beginning to be more constant and affecting his daily functioning. He describes the numbness and tingling as a pinching sensation. Pt reports he has a history of taking nitrous oxide which he purchased on the street for past 2 months. He states that he never had this before and is concerned that the Nitrix Oxide  could have been causing his symptoms. Pt mentions nothing makes his symptoms or worse. However, he is finding it very difficult to  walk and write and tie his shoes and noticed he is having more difficulty walking. He also reports sometimes the numbness and tingling radiates up to his chest but denies any chest pain. Pt denies any trauma, pain in his upper or lower extremities, fever, headache, dizziness,  neck pain, body aches, chills, sob, wheezing, chest congestion, palpitations, nausea, vomiting, abdominal pain, diarrhea or constipation. In addition, pt denies any recent travel, his immunizations being up to date, family history or any sick contacts. During Pt ED visit he received a STAT dose of vitamin B12 injection, folate and thiamine.    11/8/2017: Pt  was seen at bedside and is resting comfortably, however wants to go home today.  Pt is s/p MRI of cervical spine and thoracic spine. Pt is scheduled for LP with Dr. Resendiz today and possible discharge today with follow up with Dr. Resendiz as outpatient.  Pt has no other complaints.     REVIEW OF SYSTEMS:  General: NAD, hemodynamically stable, (-)  fever, (-) chills, (-) weakness  HEENT:  Eyes:  No visual loss, blurred vision, double vision or yellow sclerae. Ears, Nose, Throat:  No hearing loss, sneezing, congestion, runny nose or sore throat.  SKIN:  No rash or itching.  CARDIOVASCULAR:  No chest pain, chest pressure or chest discomfort. No palpitations or edema.  RESPIRATORY:  No shortness of breath, cough or sputum.  GASTROINTESTINAL:  No anorexia, nausea, vomiting or diarrhea. No abdominal pain or blood.  NEUROLOGICAL:  +  ataxia, + numbness or tingling in the upper and lower b/l extremities. No change in bowel or bladder control.  MUSCULOSKELETAL:  No muscle, back pain, joint pain or stiffness.  HEMATOLOGIC:  No anemia, bleeding or bruising.  LYMPHATICS:  No enlarged nodes.   ENDOCRINOLOGIC:  No reports of sweating, cold or heat intolerance. No polyuria or polydipsia.    ICU Vital Signs Last 24 Hrs  T(C): 36.7 (08 Nov 2017 23:12), Max: 36.7 (08 Nov 2017 23:12)  T(F): 98 (08 Nov 2017 23:12), Max: 98 (08 Nov 2017 23:12)  HR: 81 (08 Nov 2017 23:12) (80 - 81)  BP: 120/67 (08 Nov 2017 23:12) (112/66 - 120/67)  RR: 18 (08 Nov 2017 23:12) (18 - 18)  SpO2: 100% (08 Nov 2017 23:12) (98% - 100%)    PHYSICAL EXAM:  GENERAL: NAD, well-groomed, well-developed  HEAD:  NC/AT  EYES: EOMI, PERRLA, no scleral icterus  HEENT: Moist mucous membranes  NECK: Supple, No JVD  CNS:  Alert & Oriented X3, Motor Strength 5/5  upper and 4/5 lower extremities; + Ataxic Gait,  Sensation intact in upper and lower extremities.   LUNG: Clear to auscultation bilaterally; No rales, rhonchi, wheezing, or rubs  HEART: RRR; No murmurs, rubs, or gallops  ABDOMEN: +BS, ST/ND/NT  GENITOURINARY- Voiding, Bladder not distended  EXTREMITIES:  2+ Peripheral Pulses, No clubbing, cyanosis, or edema  MUSCULOSKELETAL Joints normal ROM, no Muscle or joint tenderness    MEDICATIONS  (STANDING):  amphetamine/dextroamphetamine 10 milliGRAM(s) Oral three times a day  cyanocobalamin Injectable 1000 MICROGram(s) IntraMuscular daily  folic acid 1 milliGRAM(s) Oral daily  heparin  Injectable 5000 Unit(s) SubCutaneous every 8 hours  lidocaine 1% (Preservative-free) Injectable 5 milliLiter(s) Local Injection once  lidocaine 1% (Preservative-free) Injectable 5 milliLiter(s) Local Injection once  LORazepam     Tablet 0.5 milliGRAM(s) Oral every 12 hours  nicotine -  14 mG/24Hr(s) Patch 1 patch Transdermal daily  thiamine 100 milliGRAM(s) Oral daily    MEDICATIONS  (PRN):  acetaminophen   Tablet. 650 milliGRAM(s) Oral every 6 hours PRN Mild Pain (1 - 3)  ALBUTerol    90 MICROgram(s) HFA Inhaler 2 Puff(s) Inhalation every 6 hours PRN Shortness of Breath and/or Wheezing    Labs               12.5   10.1  )-----------( 352      ( 09 Nov 2017 05:40 )             37.7     09 Nov 2017 05:40    139    |  109    |  19     ----------------------------<  110    3.9     |  27     |  0.87     Ca    8.5        09 Nov 2017 05:40        CAPILLARY BLOOD GLUCOSE    < from: CT Head No Cont (11.06.17 @ 20:23) >  IMPRESSION:     No acute intracranial findings.    < from: MR Thoracic Spine No Cont (11.07.17 @ 21:13) >  IMPRESSION:      Abnormal T2 hyperintense signal within the dorsal columns of the cervical   cord at the C3-C5 levels, with possible additional subtle T2   hyperintensity at the C7 level. No abnormal signal within the thoracic   cord.  Differential includes subacute combined degeneration of the cord (vitamin   B12 deficiency), and less likely demyelinating disease, inflammatory, or   infectious process. IV contrast would be helpful for further evaluation.      Mild degenerative changes at C2/C3, C3/C4, C5/C6, and C6/C7.     No significant degenerative disc change at the thoracic spine.    < end of copied text >    < from: MR Cervical Spine No Cont (11.07.17 @ 21:11) >  IMPRESSION:    Abnormal T2 hyperintense signal within the dorsal columns of the cervical   cord at the C3-C5 levels, with possible additional subtle T2   hyperintensity at the C7 level. No abnormal signal within the thoracic   cord.  Differential includes subacute combined degeneration of the cord (vitamin   B12 deficiency), and less likely demyelinating disease, inflammatory, or   infectious process. IV contrast would be helpful for further evaluation.    Mild degenerative changes at C2/C3, C3/C4, C5/C6, and C6/C7.   No significant degenerative disc change at the thoracic spine.  < end of copied text >  < from: MR Head No Cont (11.07.17 @ 21:09) >    IMPRESSION:     Normal MRI of the brain. Peripheral mucosal inflammation in the frontal   ethmoid, sphenoid and maxillary sinuses.  < end of copied text > 37 y/o man with PMHx of  c/o of numbness and tingling in b/l lower extremities ascending from his lower extremities to his upper extremities for past 3-4 days. Pt reports that the symptoms come and ago and are beginning to be more constant and affecting his daily functioning. He describes the numbness and tingling as a pinching sensation. Pt reports he has a history of taking nitrous oxide which he purchased on the street for past 2 months. He states that he never had this before and is concerned that the Nitrix Oxide  could have been causing his symptoms. Pt mentions nothing makes his symptoms or worse. However, he is finding it very difficult to  walk and write and tie his shoes and noticed he is having more difficulty walking. He also reports sometimes the numbness and tingling radiates up to his chest but denies any chest pain. Pt denies any trauma, pain in his upper or lower extremities, fever, headache, dizziness,  neck pain, body aches, chills, sob, wheezing, chest congestion, palpitations, nausea, vomiting, abdominal pain, diarrhea or constipation. In addition, pt denies any recent travel, his immunizations being up to date, family history or any sick contacts. During Pt ED visit he received a STAT dose of vitamin B12 injection, folate and thiamine.    11/9/2017: Pt  was seen at bedside and is resting comfortably, however wants to go home today.  Pt is s/p MRI of cervical spine and thoracic spine. Pt is scheduled for LP with Dr. Resendiz today and possible discharge today with follow up with Dr. Resendiz as outpatient.  Pt has no other complaints.     REVIEW OF SYSTEMS:  General: NAD, hemodynamically stable, (-)  fever, (-) chills, (-) weakness  HEENT:  Eyes:  No visual loss, blurred vision, double vision or yellow sclerae. Ears, Nose, Throat:  No hearing loss, sneezing, congestion, runny nose or sore throat.  SKIN:  No rash or itching.  CARDIOVASCULAR:  No chest pain, chest pressure or chest discomfort. No palpitations or edema.  RESPIRATORY:  No shortness of breath, cough or sputum.  GASTROINTESTINAL:  No anorexia, nausea, vomiting or diarrhea. No abdominal pain or blood.  NEUROLOGICAL:  +  ataxia, + numbness or tingling in the upper and lower b/l extremities. No change in bowel or bladder control.  MUSCULOSKELETAL:  No muscle, back pain, joint pain or stiffness.  HEMATOLOGIC:  No anemia, bleeding or bruising.  LYMPHATICS:  No enlarged nodes.   ENDOCRINOLOGIC:  No reports of sweating, cold or heat intolerance. No polyuria or polydipsia.    ICU Vital Signs Last 24 Hrs  T(C): 36.7 (08 Nov 2017 23:12), Max: 36.7 (08 Nov 2017 23:12)  T(F): 98 (08 Nov 2017 23:12), Max: 98 (08 Nov 2017 23:12)  HR: 81 (08 Nov 2017 23:12) (80 - 81)  BP: 120/67 (08 Nov 2017 23:12) (112/66 - 120/67)  RR: 18 (08 Nov 2017 23:12) (18 - 18)  SpO2: 100% (08 Nov 2017 23:12) (98% - 100%)    PHYSICAL EXAM:  GENERAL: NAD, well-groomed, well-developed  HEAD:  NC/AT  EYES: EOMI, PERRLA, no scleral icterus  HEENT: Moist mucous membranes  NECK: Supple, No JVD  CNS:  Alert & Oriented X3, Motor Strength 5/5  upper and 4/5 lower extremities; + Ataxic Gait,  Sensation intact in upper and lower extremities.   LUNG: Clear to auscultation bilaterally; No rales, rhonchi, wheezing, or rubs  HEART: RRR; No murmurs, rubs, or gallops  ABDOMEN: +BS, ST/ND/NT  GENITOURINARY- Voiding, Bladder not distended  EXTREMITIES:  2+ Peripheral Pulses, No clubbing, cyanosis, or edema  MUSCULOSKELETAL Joints normal ROM, no Muscle or joint tenderness    MEDICATIONS  (STANDING):  amphetamine/dextroamphetamine 10 milliGRAM(s) Oral three times a day  cyanocobalamin Injectable 1000 MICROGram(s) IntraMuscular daily  folic acid 1 milliGRAM(s) Oral daily  heparin  Injectable 5000 Unit(s) SubCutaneous every 8 hours  lidocaine 1% (Preservative-free) Injectable 5 milliLiter(s) Local Injection once  lidocaine 1% (Preservative-free) Injectable 5 milliLiter(s) Local Injection once  LORazepam     Tablet 0.5 milliGRAM(s) Oral every 12 hours  nicotine -  14 mG/24Hr(s) Patch 1 patch Transdermal daily  thiamine 100 milliGRAM(s) Oral daily    MEDICATIONS  (PRN):  acetaminophen   Tablet. 650 milliGRAM(s) Oral every 6 hours PRN Mild Pain (1 - 3)  ALBUTerol    90 MICROgram(s) HFA Inhaler 2 Puff(s) Inhalation every 6 hours PRN Shortness of Breath and/or Wheezing    Labs               12.5   10.1  )-----------( 352      ( 09 Nov 2017 05:40 )             37.7     09 Nov 2017 05:40    139    |  109    |  19     ----------------------------<  110    3.9     |  27     |  0.87     Ca    8.5        09 Nov 2017 05:40        CAPILLARY BLOOD GLUCOSE    < from: CT Head No Cont (11.06.17 @ 20:23) >  IMPRESSION:     No acute intracranial findings.    < from: MR Thoracic Spine No Cont (11.07.17 @ 21:13) >  IMPRESSION:      Abnormal T2 hyperintense signal within the dorsal columns of the cervical   cord at the C3-C5 levels, with possible additional subtle T2   hyperintensity at the C7 level. No abnormal signal within the thoracic   cord.  Differential includes subacute combined degeneration of the cord (vitamin   B12 deficiency), and less likely demyelinating disease, inflammatory, or   infectious process. IV contrast would be helpful for further evaluation.      Mild degenerative changes at C2/C3, C3/C4, C5/C6, and C6/C7.     No significant degenerative disc change at the thoracic spine.    < end of copied text >    < from: MR Cervical Spine No Cont (11.07.17 @ 21:11) >  IMPRESSION:    Abnormal T2 hyperintense signal within the dorsal columns of the cervical   cord at the C3-C5 levels, with possible additional subtle T2   hyperintensity at the C7 level. No abnormal signal within the thoracic   cord.  Differential includes subacute combined degeneration of the cord (vitamin   B12 deficiency), and less likely demyelinating disease, inflammatory, or   infectious process. IV contrast would be helpful for further evaluation.    Mild degenerative changes at C2/C3, C3/C4, C5/C6, and C6/C7.   No significant degenerative disc change at the thoracic spine.  < end of copied text >  < from: MR Head No Cont (11.07.17 @ 21:09) >    IMPRESSION:     Normal MRI of the brain. Peripheral mucosal inflammation in the frontal   ethmoid, sphenoid and maxillary sinuses.  < end of copied text >

## 2017-11-09 NOTE — PROGRESS NOTE ADULT - ATTENDING COMMENTS
Patient seen and examined with Dr. Liborio Wasserman, Noemy Bustos and Doni Duran on the Family Medicine Teaching Service.  Agree with history, physical, labs and plan which were reviewed in detail.    -Ataxic gait secondary to subacute combined degeneration of the cord and vitamin   B12 deficiency secondary to nitrous oxide poisoning

## 2017-11-09 NOTE — DISCHARGE NOTE ADULT - PATIENT PORTAL LINK FT
“You can access the [facility] Patient Portal, offered by [facility], by registering with the following website: [email or web address]”

## 2017-11-09 NOTE — PROGRESS NOTE ADULT - ASSESSMENT
37 y/o man c/o of numbness and tingling in b/l lower extremities ascending from his lower extremities to his upper extremities for past 3-4 days.
37 y/o man c/o of numbness and tingling in b/l lower extremities ascending from his lower extremities to his upper extremities for past 3-4 days.
37 year old man admitted with c/o acute onset paresthesias, gait difficulties, exam c/w with peripheral neuropathy, post columns findings. MRI suggestive of subacute combined degeneration vs demyelinating disease. Discussed doing spinal tap to r./o demyelinating disease, he would agree but only with sedation, under fluoro. Wants to go home for his birthday today.
37 y/o man c/o of numbness and tingling in b/l lower extremities ascending from his lower extremities to his upper extremities for past 3-4 days.

## 2017-11-09 NOTE — PROGRESS NOTE ADULT - PROBLEM SELECTOR PROBLEM 2
ADHD (attention deficit hyperactivity disorder)

## 2017-11-10 LAB
ALBUMIN CSF-MCNC: 29.9 MG/DL — HIGH (ref 14–25)
ALBUMIN SERPL ELPH-MCNC: 4040 MG/DL — SIGNIFICANT CHANGE UP (ref 3500–5200)
CRYPTOC AG CSF-ACNC: NEGATIVE — SIGNIFICANT CHANGE UP
IGG CSF-MCNC: 3.1 MG/DL — SIGNIFICANT CHANGE UP
IGG FLD-MCNC: 914 MG/DL — SIGNIFICANT CHANGE UP (ref 694–1618)
IGG SYNTH RATE SER+CSF CALC-MRATE: -2.9 MG/DAY — SIGNIFICANT CHANGE UP
IGG/ALB CLEAR SER+CSF-RTO: 0.5 — SIGNIFICANT CHANGE UP
IGG/ALB CSF: 0.1 RATIO — SIGNIFICANT CHANGE UP
IGG/ALB SER: 0.23 RATIO — SIGNIFICANT CHANGE UP
METHYLMALONATE SERPL-SCNC: 1243 NMOL/L — HIGH (ref 0–378)
PROT CSF-MCNC: 49 MG/DL — HIGH (ref 15–45)

## 2017-11-11 LAB — VDRL CSF-TITR: NEGATIVE — SIGNIFICANT CHANGE UP

## 2017-11-12 LAB
CULTURE RESULTS: NO GROWTH — SIGNIFICANT CHANGE UP
SPECIMEN SOURCE: SIGNIFICANT CHANGE UP

## 2017-11-13 DIAGNOSIS — F41.9 ANXIETY DISORDER, UNSPECIFIED: ICD-10-CM

## 2017-11-13 DIAGNOSIS — R20.0 ANESTHESIA OF SKIN: ICD-10-CM

## 2017-11-13 DIAGNOSIS — R26.0 ATAXIC GAIT: ICD-10-CM

## 2017-11-13 DIAGNOSIS — Z79.51 LONG TERM (CURRENT) USE OF INHALED STEROIDS: ICD-10-CM

## 2017-11-13 DIAGNOSIS — F90.9 ATTENTION-DEFICIT HYPERACTIVITY DISORDER, UNSPECIFIED TYPE: ICD-10-CM

## 2017-11-13 DIAGNOSIS — J44.9 CHRONIC OBSTRUCTIVE PULMONARY DISEASE, UNSPECIFIED: ICD-10-CM

## 2017-11-13 DIAGNOSIS — G32.0 SUBACUTE COMBINED DEGENERATION OF SPINAL CORD IN DISEASES CLASSIFIED ELSEWHERE: ICD-10-CM

## 2017-11-13 DIAGNOSIS — F17.210 NICOTINE DEPENDENCE, CIGARETTES, UNCOMPLICATED: ICD-10-CM

## 2017-11-13 DIAGNOSIS — E53.8 DEFICIENCY OF OTHER SPECIFIED B GROUP VITAMINS: ICD-10-CM

## 2017-11-13 LAB — NON-GYN CYTOLOGY SPEC: SIGNIFICANT CHANGE UP

## 2017-11-14 LAB
B BURGDOR AB CSF-ACNC: SIGNIFICANT CHANGE UP
OLIGOCLONAL BANDS CSF ELPH-IMP: SIGNIFICANT CHANGE UP
OLIGOCLONAL BANDS CSF ELPH-IMP: SIGNIFICANT CHANGE UP

## 2017-11-18 LAB — MBP CSF-MCNC: 6.1 MCG/L — HIGH

## 2017-12-09 LAB
CULTURE RESULTS: SIGNIFICANT CHANGE UP
SPECIMEN SOURCE: SIGNIFICANT CHANGE UP

## 2017-12-30 LAB
CULTURE RESULTS: SIGNIFICANT CHANGE UP
SPECIMEN SOURCE: SIGNIFICANT CHANGE UP

## 2020-06-01 ENCOUNTER — INPATIENT (INPATIENT)
Facility: HOSPITAL | Age: 40
LOS: 4 days | Discharge: HOME | DRG: 720 | End: 2020-06-06
Attending: HOSPITALIST | Admitting: INTERNAL MEDICINE
Payer: COMMERCIAL

## 2020-06-01 VITALS
SYSTOLIC BLOOD PRESSURE: 134 MMHG | RESPIRATION RATE: 26 BRPM | WEIGHT: 279.99 LBS | HEIGHT: 70 IN | DIASTOLIC BLOOD PRESSURE: 60 MMHG | OXYGEN SATURATION: 73 % | TEMPERATURE: 98 F | HEART RATE: 80 BPM

## 2020-06-01 DIAGNOSIS — Z98.890 OTHER SPECIFIED POSTPROCEDURAL STATES: Chronic | ICD-10-CM

## 2020-06-01 DIAGNOSIS — J18.9 PNEUMONIA, UNSPECIFIED ORGANISM: ICD-10-CM

## 2020-06-01 PROBLEM — J40 BRONCHITIS, NOT SPECIFIED AS ACUTE OR CHRONIC: Chronic | Status: ACTIVE | Noted: 2017-11-07

## 2020-06-01 LAB
ADD ON TEST-SPECIMEN IN LAB: SIGNIFICANT CHANGE UP
ALBUMIN SERPL ELPH-MCNC: 3.4 G/DL — SIGNIFICANT CHANGE UP (ref 3.3–5)
ALP SERPL-CCNC: 105 U/L — SIGNIFICANT CHANGE UP (ref 40–120)
ALT FLD-CCNC: 118 U/L — HIGH (ref 12–78)
APPEARANCE UR: CLEAR — SIGNIFICANT CHANGE UP
APTT BLD: 26.9 SEC — LOW (ref 27.5–36.3)
AST SERPL-CCNC: 40 U/L — HIGH (ref 15–37)
BASE EXCESS BLDA CALC-SCNC: -0.1 MMOL/L — SIGNIFICANT CHANGE UP (ref -2–2)
BASE EXCESS BLDA CALC-SCNC: 2.9 MMOL/L — HIGH (ref -2–2)
BASE EXCESS BLDV CALC-SCNC: 0.6 MMOL/L — SIGNIFICANT CHANGE UP (ref -2–2)
BASOPHILS # BLD AUTO: 0.05 K/UL — SIGNIFICANT CHANGE UP (ref 0–0.2)
BASOPHILS NFR BLD AUTO: 0.3 % — SIGNIFICANT CHANGE UP (ref 0–2)
BILIRUB SERPL-MCNC: 0.4 MG/DL — SIGNIFICANT CHANGE UP (ref 0.2–1.2)
BILIRUB UR-MCNC: NEGATIVE — SIGNIFICANT CHANGE UP
BLOOD GAS COMMENTS ARTERIAL: SIGNIFICANT CHANGE UP
BUN SERPL-MCNC: 26 MG/DL — HIGH (ref 7–23)
CALCIUM SERPL-MCNC: 8.5 MG/DL — SIGNIFICANT CHANGE UP (ref 8.5–10.1)
CHLORIDE SERPL-SCNC: 104 MMOL/L — SIGNIFICANT CHANGE UP (ref 96–108)
CO2 SERPL-SCNC: 33 MMOL/L — HIGH (ref 22–31)
COLOR SPEC: ABNORMAL
CREAT SERPL-MCNC: 0.87 MG/DL — SIGNIFICANT CHANGE UP (ref 0.5–1.3)
DIFF PNL FLD: NEGATIVE — SIGNIFICANT CHANGE UP
EOSINOPHIL # BLD AUTO: 0.02 K/UL — SIGNIFICANT CHANGE UP (ref 0–0.5)
EOSINOPHIL NFR BLD AUTO: 0.1 % — SIGNIFICANT CHANGE UP (ref 0–6)
GAS PNL BLDA: SIGNIFICANT CHANGE UP
GAS PNL BLDA: SIGNIFICANT CHANGE UP
GLUCOSE SERPL-MCNC: 127 MG/DL — HIGH (ref 70–99)
GLUCOSE UR QL: NEGATIVE MG/DL — SIGNIFICANT CHANGE UP
HCO3 BLDA-SCNC: 31 MMOL/L — HIGH (ref 21–29)
HCO3 BLDA-SCNC: 32 MMOL/L — HIGH (ref 21–29)
HCO3 BLDV-SCNC: 30 MMOL/L — HIGH (ref 21–29)
HCT VFR BLD CALC: 42.1 % — SIGNIFICANT CHANGE UP (ref 39–50)
HGB BLD-MCNC: 13.2 G/DL — SIGNIFICANT CHANGE UP (ref 13–17)
HOROWITZ INDEX BLDA+IHG-RTO: 100 — SIGNIFICANT CHANGE UP
IMM GRANULOCYTES NFR BLD AUTO: 1.6 % — HIGH (ref 0–1.5)
INR BLD: 1.04 RATIO — SIGNIFICANT CHANGE UP (ref 0.88–1.16)
KETONES UR-MCNC: NEGATIVE — SIGNIFICANT CHANGE UP
LACTATE SERPL-SCNC: 1.6 MMOL/L — SIGNIFICANT CHANGE UP (ref 0.7–2)
LEUKOCYTE ESTERASE UR-ACNC: NEGATIVE — SIGNIFICANT CHANGE UP
LYMPHOCYTES # BLD AUTO: 14.5 % — SIGNIFICANT CHANGE UP (ref 13–44)
LYMPHOCYTES # BLD AUTO: 2.69 K/UL — SIGNIFICANT CHANGE UP (ref 1–3.3)
MCHC RBC-ENTMCNC: 31.4 GM/DL — LOW (ref 32–36)
MCHC RBC-ENTMCNC: 33.4 PG — SIGNIFICANT CHANGE UP (ref 27–34)
MCV RBC AUTO: 106.6 FL — HIGH (ref 80–100)
MONOCYTES # BLD AUTO: 1.13 K/UL — HIGH (ref 0–0.9)
MONOCYTES NFR BLD AUTO: 6.1 % — SIGNIFICANT CHANGE UP (ref 2–14)
NEUTROPHILS # BLD AUTO: 14.34 K/UL — HIGH (ref 1.8–7.4)
NEUTROPHILS NFR BLD AUTO: 77.4 % — HIGH (ref 43–77)
NITRITE UR-MCNC: NEGATIVE — SIGNIFICANT CHANGE UP
PCO2 BLDA: 76 MMHG — CRITICAL HIGH (ref 32–46)
PCO2 BLDA: 92 MMHG — CRITICAL HIGH (ref 32–46)
PCO2 BLDV: 71 MMHG — HIGH (ref 35–50)
PCP SPEC-MCNC: SIGNIFICANT CHANGE UP
PH BLDA: 7.16 — CRITICAL LOW (ref 7.35–7.45)
PH BLDA: 7.25 — LOW (ref 7.35–7.45)
PH BLDV: 7.24 — LOW (ref 7.35–7.45)
PH UR: 5 — SIGNIFICANT CHANGE UP (ref 5–8)
PLATELET # BLD AUTO: 306 K/UL — SIGNIFICANT CHANGE UP (ref 150–400)
PO2 BLDA: 116 MMHG — HIGH (ref 74–108)
PO2 BLDA: 63 MMHG — LOW (ref 74–108)
PO2 BLDV: 281 MMHG — HIGH (ref 25–45)
POTASSIUM SERPL-MCNC: 4.3 MMOL/L — SIGNIFICANT CHANGE UP (ref 3.5–5.3)
POTASSIUM SERPL-SCNC: 4.3 MMOL/L — SIGNIFICANT CHANGE UP (ref 3.5–5.3)
PROT SERPL-MCNC: 7.3 GM/DL — SIGNIFICANT CHANGE UP (ref 6–8.3)
PROT UR-MCNC: 30 MG/DL
PROTHROM AB SERPL-ACNC: 11.6 SEC — SIGNIFICANT CHANGE UP (ref 10–12.9)
RBC # BLD: 3.95 M/UL — LOW (ref 4.2–5.8)
RBC # FLD: 15.8 % — HIGH (ref 10.3–14.5)
SAO2 % BLDA: 89 % — LOW (ref 92–96)
SAO2 % BLDA: 97 % — HIGH (ref 92–96)
SAO2 % BLDV: 99 % — HIGH (ref 67–88)
SARS-COV-2 RNA SPEC QL NAA+PROBE: SIGNIFICANT CHANGE UP
SODIUM SERPL-SCNC: 137 MMOL/L — SIGNIFICANT CHANGE UP (ref 135–145)
SP GR SPEC: 1.02 — SIGNIFICANT CHANGE UP (ref 1.01–1.02)
UROBILINOGEN FLD QL: 1 MG/DL
WBC # BLD: 18.53 K/UL — HIGH (ref 3.8–10.5)
WBC # FLD AUTO: 18.53 K/UL — HIGH (ref 3.8–10.5)

## 2020-06-01 PROCEDURE — 99291 CRITICAL CARE FIRST HOUR: CPT

## 2020-06-01 PROCEDURE — 36415 COLL VENOUS BLD VENIPUNCTURE: CPT

## 2020-06-01 PROCEDURE — 83880 ASSAY OF NATRIURETIC PEPTIDE: CPT

## 2020-06-01 PROCEDURE — 94003 VENT MGMT INPAT SUBQ DAY: CPT

## 2020-06-01 PROCEDURE — 84443 ASSAY THYROID STIM HORMONE: CPT

## 2020-06-01 PROCEDURE — 82728 ASSAY OF FERRITIN: CPT

## 2020-06-01 PROCEDURE — 80048 BASIC METABOLIC PNL TOTAL CA: CPT

## 2020-06-01 PROCEDURE — 82803 BLOOD GASES ANY COMBINATION: CPT

## 2020-06-01 PROCEDURE — 83735 ASSAY OF MAGNESIUM: CPT

## 2020-06-01 PROCEDURE — 84145 PROCALCITONIN (PCT): CPT

## 2020-06-01 PROCEDURE — 99223 1ST HOSP IP/OBS HIGH 75: CPT

## 2020-06-01 PROCEDURE — 71045 X-RAY EXAM CHEST 1 VIEW: CPT

## 2020-06-01 PROCEDURE — 82248 BILIRUBIN DIRECT: CPT

## 2020-06-01 PROCEDURE — 84100 ASSAY OF PHOSPHORUS: CPT

## 2020-06-01 PROCEDURE — 94761 N-INVAS EAR/PLS OXIMETRY MLT: CPT

## 2020-06-01 PROCEDURE — 93010 ELECTROCARDIOGRAM REPORT: CPT

## 2020-06-01 PROCEDURE — 86140 C-REACTIVE PROTEIN: CPT

## 2020-06-01 PROCEDURE — 71275 CT ANGIOGRAPHY CHEST: CPT

## 2020-06-01 PROCEDURE — 36600 WITHDRAWAL OF ARTERIAL BLOOD: CPT

## 2020-06-01 PROCEDURE — 87086 URINE CULTURE/COLONY COUNT: CPT

## 2020-06-01 PROCEDURE — 71045 X-RAY EXAM CHEST 1 VIEW: CPT | Mod: 26,77

## 2020-06-01 PROCEDURE — 80307 DRUG TEST PRSMV CHEM ANLYZR: CPT

## 2020-06-01 PROCEDURE — 85379 FIBRIN DEGRADATION QUANT: CPT

## 2020-06-01 PROCEDURE — 87635 SARS-COV-2 COVID-19 AMP PRB: CPT

## 2020-06-01 PROCEDURE — 94640 AIRWAY INHALATION TREATMENT: CPT

## 2020-06-01 PROCEDURE — 93306 TTE W/DOPPLER COMPLETE: CPT

## 2020-06-01 PROCEDURE — 80053 COMPREHEN METABOLIC PANEL: CPT

## 2020-06-01 PROCEDURE — 81001 URINALYSIS AUTO W/SCOPE: CPT

## 2020-06-01 PROCEDURE — 83615 LACTATE (LD) (LDH) ENZYME: CPT

## 2020-06-01 PROCEDURE — 85027 COMPLETE CBC AUTOMATED: CPT

## 2020-06-01 PROCEDURE — C9113: CPT

## 2020-06-01 PROCEDURE — 71045 X-RAY EXAM CHEST 1 VIEW: CPT | Mod: 26

## 2020-06-01 RX ORDER — PROPOFOL 10 MG/ML
10 INJECTION, EMULSION INTRAVENOUS
Qty: 500 | Refills: 0 | Status: DISCONTINUED | OUTPATIENT
Start: 2020-06-01 | End: 2020-06-01

## 2020-06-01 RX ORDER — ALBUTEROL 90 UG/1
2 AEROSOL, METERED ORAL ONCE
Refills: 0 | Status: DISCONTINUED | OUTPATIENT
Start: 2020-06-01 | End: 2020-06-05

## 2020-06-01 RX ORDER — SODIUM CHLORIDE 9 MG/ML
2000 INJECTION INTRAMUSCULAR; INTRAVENOUS; SUBCUTANEOUS ONCE
Refills: 0 | Status: COMPLETED | OUTPATIENT
Start: 2020-06-01 | End: 2020-06-01

## 2020-06-01 RX ORDER — DEXTROAMPHETAMINE SACCHARATE, AMPHETAMINE ASPARTATE, DEXTROAMPHETAMINE SULFATE AND AMPHETAMINE SULFATE 1.875; 1.875; 1.875; 1.875 MG/1; MG/1; MG/1; MG/1
0 TABLET ORAL
Qty: 450 | Refills: 0 | DISCHARGE

## 2020-06-01 RX ORDER — SODIUM CHLORIDE 9 MG/ML
1000 INJECTION INTRAMUSCULAR; INTRAVENOUS; SUBCUTANEOUS
Refills: 0 | Status: DISCONTINUED | OUTPATIENT
Start: 2020-06-01 | End: 2020-06-02

## 2020-06-01 RX ORDER — SUCCINYLCHOLINE CHLORIDE 100 MG/5ML
100 SYRINGE (ML) INTRAVENOUS ONCE
Refills: 0 | Status: COMPLETED | OUTPATIENT
Start: 2020-06-01 | End: 2020-06-01

## 2020-06-01 RX ORDER — IPRATROPIUM/ALBUTEROL SULFATE 18-103MCG
3 AEROSOL WITH ADAPTER (GRAM) INHALATION EVERY 6 HOURS
Refills: 0 | Status: DISCONTINUED | OUTPATIENT
Start: 2020-06-01 | End: 2020-06-01

## 2020-06-01 RX ORDER — CEFTRIAXONE 500 MG/1
1000 INJECTION, POWDER, FOR SOLUTION INTRAMUSCULAR; INTRAVENOUS ONCE
Refills: 0 | Status: DISCONTINUED | OUTPATIENT
Start: 2020-06-01 | End: 2020-06-01

## 2020-06-01 RX ORDER — ALBUTEROL 90 UG/1
0 AEROSOL, METERED ORAL
Qty: 8 | Refills: 0 | DISCHARGE

## 2020-06-01 RX ORDER — PROPOFOL 10 MG/ML
30 INJECTION, EMULSION INTRAVENOUS
Qty: 1000 | Refills: 0 | Status: DISCONTINUED | OUTPATIENT
Start: 2020-06-01 | End: 2020-06-02

## 2020-06-01 RX ORDER — CEFTRIAXONE 500 MG/1
1000 INJECTION, POWDER, FOR SOLUTION INTRAMUSCULAR; INTRAVENOUS EVERY 24 HOURS
Refills: 0 | Status: COMPLETED | OUTPATIENT
Start: 2020-06-01 | End: 2020-06-01

## 2020-06-01 RX ORDER — ACETAMINOPHEN 500 MG
650 TABLET ORAL ONCE
Refills: 0 | Status: COMPLETED | OUTPATIENT
Start: 2020-06-01 | End: 2020-06-01

## 2020-06-01 RX ORDER — TIOTROPIUM BROMIDE 18 UG/1
1 CAPSULE ORAL; RESPIRATORY (INHALATION) DAILY
Refills: 0 | Status: COMPLETED | OUTPATIENT
Start: 2020-06-01 | End: 2021-04-30

## 2020-06-01 RX ORDER — IPRATROPIUM BROMIDE 0.2 MG/ML
0 SOLUTION, NON-ORAL INHALATION
Qty: 12 | Refills: 0 | DISCHARGE

## 2020-06-01 RX ORDER — IPRATROPIUM/ALBUTEROL SULFATE 18-103MCG
3 AEROSOL WITH ADAPTER (GRAM) INHALATION
Refills: 0 | Status: DISCONTINUED | OUTPATIENT
Start: 2020-06-01 | End: 2020-06-01

## 2020-06-01 RX ORDER — PANTOPRAZOLE SODIUM 20 MG/1
40 TABLET, DELAYED RELEASE ORAL DAILY
Refills: 0 | Status: DISCONTINUED | OUTPATIENT
Start: 2020-06-01 | End: 2020-06-02

## 2020-06-01 RX ORDER — ENOXAPARIN SODIUM 100 MG/ML
40 INJECTION SUBCUTANEOUS AT BEDTIME
Refills: 0 | Status: DISCONTINUED | OUTPATIENT
Start: 2020-06-01 | End: 2020-06-01

## 2020-06-01 RX ORDER — CEFTRIAXONE 500 MG/1
1000 INJECTION, POWDER, FOR SOLUTION INTRAMUSCULAR; INTRAVENOUS ONCE
Refills: 0 | Status: COMPLETED | OUTPATIENT
Start: 2020-06-01 | End: 2020-06-01

## 2020-06-01 RX ORDER — ETOMIDATE 2 MG/ML
20 INJECTION INTRAVENOUS ONCE
Refills: 0 | Status: COMPLETED | OUTPATIENT
Start: 2020-06-01 | End: 2020-06-01

## 2020-06-01 RX ORDER — CHLORHEXIDINE GLUCONATE 213 G/1000ML
1 SOLUTION TOPICAL
Refills: 0 | Status: DISCONTINUED | OUTPATIENT
Start: 2020-06-01 | End: 2020-06-02

## 2020-06-01 RX ORDER — CHLORHEXIDINE GLUCONATE 213 G/1000ML
15 SOLUTION TOPICAL EVERY 12 HOURS
Refills: 0 | Status: DISCONTINUED | OUTPATIENT
Start: 2020-06-01 | End: 2020-06-02

## 2020-06-01 RX ORDER — ENOXAPARIN SODIUM 100 MG/ML
40 INJECTION SUBCUTANEOUS EVERY 12 HOURS
Refills: 0 | Status: DISCONTINUED | OUTPATIENT
Start: 2020-06-01 | End: 2020-06-06

## 2020-06-01 RX ORDER — ALBUTEROL 90 UG/1
2 AEROSOL, METERED ORAL EVERY 4 HOURS
Refills: 0 | Status: COMPLETED | OUTPATIENT
Start: 2020-06-01 | End: 2021-04-30

## 2020-06-01 RX ORDER — AZITHROMYCIN 500 MG/1
500 TABLET, FILM COATED ORAL EVERY 24 HOURS
Refills: 0 | Status: DISCONTINUED | OUTPATIENT
Start: 2020-06-01 | End: 2020-06-03

## 2020-06-01 RX ORDER — FLUTICASONE PROPIONATE AND SALMETEROL 50; 250 UG/1; UG/1
1 POWDER ORAL; RESPIRATORY (INHALATION)
Qty: 0 | Refills: 0 | DISCHARGE

## 2020-06-01 RX ORDER — AZITHROMYCIN 500 MG/1
500 TABLET, FILM COATED ORAL ONCE
Refills: 0 | Status: COMPLETED | OUTPATIENT
Start: 2020-06-01 | End: 2020-06-01

## 2020-06-01 RX ADMIN — Medication 125 MILLIGRAM(S): at 10:45

## 2020-06-01 RX ADMIN — SODIUM CHLORIDE 100 MILLILITER(S): 9 INJECTION INTRAMUSCULAR; INTRAVENOUS; SUBCUTANEOUS at 23:45

## 2020-06-01 RX ADMIN — CEFTRIAXONE 1000 MILLIGRAM(S): 500 INJECTION, POWDER, FOR SOLUTION INTRAMUSCULAR; INTRAVENOUS at 15:00

## 2020-06-01 RX ADMIN — CEFTRIAXONE 1000 MILLIGRAM(S): 500 INJECTION, POWDER, FOR SOLUTION INTRAMUSCULAR; INTRAVENOUS at 10:45

## 2020-06-01 RX ADMIN — AZITHROMYCIN 255 MILLIGRAM(S): 500 TABLET, FILM COATED ORAL at 15:00

## 2020-06-01 RX ADMIN — CHLORHEXIDINE GLUCONATE 15 MILLILITER(S): 213 SOLUTION TOPICAL at 18:05

## 2020-06-01 RX ADMIN — ETOMIDATE 20 MILLIGRAM(S): 2 INJECTION INTRAVENOUS at 11:17

## 2020-06-01 RX ADMIN — Medication 40 MILLIGRAM(S): at 21:26

## 2020-06-01 RX ADMIN — ENOXAPARIN SODIUM 40 MILLIGRAM(S): 100 INJECTION SUBCUTANEOUS at 18:04

## 2020-06-01 RX ADMIN — PROPOFOL 7.62 MICROGRAM(S)/KG/MIN: 10 INJECTION, EMULSION INTRAVENOUS at 11:58

## 2020-06-01 RX ADMIN — Medication 100 MILLIGRAM(S): at 11:17

## 2020-06-01 RX ADMIN — PANTOPRAZOLE SODIUM 40 MILLIGRAM(S): 20 TABLET, DELAYED RELEASE ORAL at 15:05

## 2020-06-01 RX ADMIN — SODIUM CHLORIDE 2000 MILLILITER(S): 9 INJECTION INTRAMUSCULAR; INTRAVENOUS; SUBCUTANEOUS at 12:17

## 2020-06-01 RX ADMIN — PROPOFOL 24.3 MICROGRAM(S)/KG/MIN: 10 INJECTION, EMULSION INTRAVENOUS at 23:46

## 2020-06-01 RX ADMIN — Medication 40 MILLIGRAM(S): at 15:05

## 2020-06-01 RX ADMIN — AZITHROMYCIN 255 MILLIGRAM(S): 500 TABLET, FILM COATED ORAL at 10:45

## 2020-06-01 RX ADMIN — PROPOFOL 7.62 MICROGRAM(S)/KG/MIN: 10 INJECTION, EMULSION INTRAVENOUS at 15:05

## 2020-06-01 RX ADMIN — CHLORHEXIDINE GLUCONATE 1 APPLICATION(S): 213 SOLUTION TOPICAL at 18:05

## 2020-06-01 RX ADMIN — Medication 650 MILLIGRAM(S): at 10:45

## 2020-06-01 NOTE — ED PROVIDER NOTE - PROGRESS NOTE DETAILS
Sat holding mid-90's on oxygen .  XR with patchy bilateral infiltrates. Febrile to 102. Ill appearing  XRay with patchy bilateral infiltrates.  ?Left effusion.  Rocephin/Azithro ordered.  I discussed the potential need for intubation/ventilation should his respiratory status deteriorate -- "I'd like to avoid that at all costs." (Patient's words).  Will continue with current management, add steroids/MDI beta-agonist, consult ICU. Patient with increasing lethargy, arousable to painful stimuli -- will require intubation and mechanical ventilation.  Discussed briefly with the patient -- agreeable. Spoke with mother (Chantal Crespo [phone]) -- states her son abuses nitrous oxide and has a prior admission for same (noted in EMR).  Some associated gait disturbances at that time associated with B12 deficiency (requiring injections).

## 2020-06-01 NOTE — PROGRESS NOTE ADULT - ASSESSMENT
IMP:    40 y/o male pt of size, possible substance abuse and COPD admitted with acute on chronic type 2 resp failure and acute type 1 resp failure and LLL CAP  Leukocytosis     High risk for acute decompensation and deterioration including death while on life support    Plan:    Full vent support--LPV strategy to maintain plateau pressures < 30--High PEEP/low TV--Permissive hypercapnea and acidemia--decrease  and increase RR 18/Increase PEEP 10 and decrease FIO2 60.  Repeat ABG  Cont with CTX/Z (1)  Sent UTox   NPO   IVF   HOB > 30  DVT prophy--SCD and BID LMWH    ICU care

## 2020-06-01 NOTE — ED PROVIDER NOTE - CRITICAL CARE PROVIDED
direct patient care (not related to procedure)/interpretation of diagnostic studies/consultation with other physicians/conducted a detailed discussion of DNR status

## 2020-06-01 NOTE — ED ADULT NURSE NOTE - OBJECTIVE STATEMENT
pt presents to ed via ems for shortness of breath x few days. pt has hx of cigarette use and COPD. pt unable to provide hx due to sob. pt drowsy, a&o x2. pt placed on nonbreather with oxygen >96%. pt labored breathing. pt bp normotensive, hr in 90s. pt febrile rectally.

## 2020-06-01 NOTE — ED ADULT NURSE NOTE - NSIMPLEMENTINTERV_GEN_ALL_ED
Implemented All Fall Risk Interventions:  [place] to call system. Call bell, personal items and telephone within reach. Instruct patient to call for assistance. Room bathroom lighting operational. Non-slip footwear when patient is off stretcher. Physically safe environment: no spills, clutter or unnecessary equipment. Stretcher in lowest position, wheels locked, appropriate side rails in place. Provide visual cue, wrist band, yellow gown, etc. Monitor gait and stability. Monitor for mental status changes and reorient to person, place, and time. Review medications for side effects contributing to fall risk. Reinforce activity limits and safety measures with patient and family.

## 2020-06-01 NOTE — H&P ADULT - HISTORY OF PRESENT ILLNESS
40 y/o male with a PMHx of COPD and nicotine abuse presents to the ED c/o SOB, coughing and wheezing for the past few days. Pt is a limited historian due to confusion sec to hypoxia most likely; pending abg. He is awake, alert, urinated all over the bed without asking for urinal; pt was intubated shortly after due to lethargy and hypoxia 40 y/o male with a PMHx of COPD and nicotine abuse presents to the ED c/o SOB, coughing and wheezing for the past few days.  He  was intubated shortly after due to lethargy and hypoxia. After intubation he became hypotensive. Called Mr. Ascencio  listed as contact and left message.

## 2020-06-01 NOTE — H&P ADULT - NSHPPHYSICALEXAM_GEN_ALL_CORE
Intubated and sedated  heent pupils 3 mm reactive  chest mild rhonchi wheezes  abd soft nt nd +bs  extr no e/c/c  neuro sedated and intubated

## 2020-06-01 NOTE — ED ADULT TRIAGE NOTE - CHIEF COMPLAINT QUOTE
Patient comes in with shortness of breath that started today. Patient smokes 1 pack of cigarettes per day. As per EMS, patients initial O2 was 68% on room air. Patient placed on non rebreather by EMS with improvements to 98%.

## 2020-06-01 NOTE — H&P ADULT - ASSESSMENT
* Acute hypoxic resp. failure sec to COPD exac sec to CAP: COVID 19 negative  - mechanical ventilation  - sedation with propofol  - bolus NS / hypotensive after intubation and sedation  - ceftriaxone, zithromax, solumedrol  - abg post intubation  - cxr for ET position, OG tube    * LMWH vte px  PPI iV

## 2020-06-01 NOTE — ED ADULT NURSE REASSESSMENT NOTE - NS ED NURSE REASSESS COMMENT FT1
Pt work of breathing worsened while on nonrebreather, pt more labored and  becoming lethargic. pt intubated by md Gutierrez,. 25 @ lip, size 7.5. pt tolarated well. og tube placed. condom cath placed. pt oxygen >95% on ventilator. bp 100s systolic.

## 2020-06-01 NOTE — ED PROVIDER NOTE - OBJECTIVE STATEMENT
40 y/o male with a PMHx of COPD and nicotine abuse presents to the ED c/o SOB, coughing and wheezing for the past few days. Pt is a limited historian due to SOB.

## 2020-06-01 NOTE — ED PROVIDER NOTE - CLINICAL SUMMARY MEDICAL DECISION MAKING FREE TEXT BOX
38 y/o male with SOB, coughing, and wheezing. Plan - XR, labs, COVID swab, abx, respiratory support, re-assess.

## 2020-06-01 NOTE — H&P ADULT - NSHPLABSRESULTS_GEN_ALL_CORE
13.2   18.53 )-----------( 306      ( 01 Jun 2020 09:58 )             42.1   06-01    137  |  104  |  26<H>  ----------------------------<  127<H>  4.3   |  33<H>  |  0.87    Ca    8.5      01 Jun 2020 09:58    TPro  7.3  /  Alb  3.4  /  TBili  0.4  /  DBili  x   /  AST  40<H>  /  ALT  118<H>  /  AlkPhos  105  06-01

## 2020-06-01 NOTE — PROGRESS NOTE ADULT - SUBJECTIVE AND OBJECTIVE BOX
Hospital # 0  ICU # 0  Vent # 0    CC:  Respiratory Failure     HPI:    38 y/o male pt of size with COPD and nicotine abuse presents to the ED c/o SOB, coughing and wheezing for the past few days.  He  was intubated shortly after due to lethargy and hypoxia. After intubation he became hypotensive--IVF ordered.  On my exam, vented and sedated,  Hemodynamically stable.  Tm 102.5  WBC 18.  ABG suggestive of acute on chronic hypercapneic resp failure as well as acute type 1 resp failure.  CXR-- Personally reviewed--ETT OK/LLL opacification.  Given CTX/Z in ED.  Pt presently in ICU.  Vent PAP 28-30     PMH:  As above.     PSH:  As above.     FH: Non Contributory other than those listed in HPI    Social History:  Unobtainable due to clinical condition     MEDICATIONS  (STANDING):  ALBUTerol    90 MICROgram(s) HFA Inhaler 2 Puff(s) Inhalation Once  azithromycin  IVPB 500 milliGRAM(s) IV Intermittent every 24 hours  cefTRIAXone Injectable. 1000 milliGRAM(s) IV Push every 24 hours  chlorhexidine 0.12% Liquid 15 milliLiter(s) Oral Mucosa every 12 hours  chlorhexidine 4% Liquid 1 Application(s) Topical <User Schedule>  enoxaparin Injectable 40 milliGRAM(s) SubCutaneous every 12 hours  methylPREDNISolone sodium succinate Injectable 40 milliGRAM(s) IV Push every 8 hours  pantoprazole  Injectable 40 milliGRAM(s) IV Push daily  propofol Infusion 10 MICROgram(s)/kG/Min (7.62 mL/Hr) IV Continuous <Continuous>  sodium chloride 0.9%. 1000 milliLiter(s) (100 mL/Hr) IV Continuous <Continuous>  tiotropium 18 MICROgram(s) Capsule 1 Capsule(s) Inhalation daily    MEDICATIONS  (PRN):  ALBUTerol    90 MICROgram(s) HFA Inhaler 2 Puff(s) Inhalation every 4 hours PRN Shortness of Breath      Allergies: NKDA    ROS:  SEE BELOW    Height (cm): 177.8 (06-01 @ 09:44)  Weight (kg): 127 (06-01 @ 09:44)  BMI (kg/m2): 40.2 (06-01 @ 09:44)    ICU Vital Signs Last 24 Hrs  T(C): 39.2 (01 Jun 2020 10:24), Max: 39.2 (01 Jun 2020 10:24)  T(F): 102.5 (01 Jun 2020 10:24), Max: 102.5 (01 Jun 2020 10:24)  HR: 89 (01 Jun 2020 12:47) (80 - 110)  BP: 106/64 (01 Jun 2020 12:47) (103/66 - 134/60)  BP(mean): --  ABP: --  ABP(mean): --  RR: 20 (01 Jun 2020 12:47) (14 - 26)  SpO2: 95% (01 Jun 2020 12:47) (73% - 99%)      Mode: AC/ CMV (Assist Control/ Continuous Mandatory Ventilation)  RR (machine): 14  TV (machine): 470  FiO2: 100  PEEP: 5  ITime: 1  MAP: 12  PIP: 32      I&O's Summary      Physical Exam:  SEE BELOW                          13.2   18.53 )-----------( 306      ( 01 Jun 2020 09:58 )             42.1       06-01    137  |  104  |  26<H>  ----------------------------<  127<H>  4.3   |  33<H>  |  0.87    Ca    8.5      01 Jun 2020 09:58    TPro  7.3  /  Alb  3.4  /  TBili  0.4  /  DBili  x   /  AST  40<H>  /  ALT  118<H>  /  AlkPhos  105  06-01            ABG - ( 01 Jun 2020 12:17 )  pH, Arterial: 7.16  pH, Blood: x     /  pCO2: 92    /  pO2: 116   / HCO3: 31    / Base Excess: -.1   /  SaO2: 97                      DVT Prophylaxis:                                                            Contraindication:     Advanced Directives:    Discussed with:    Visit Information:  Time spent excluding procedure:  45 cc mins    ** Time is exclusive of billed procedures and/or teaching and/or routine family updates.

## 2020-06-01 NOTE — H&P ADULT - NSICDXPASTMEDICALHX_GEN_ALL_CORE_FT
PAST MEDICAL HISTORY:  Bronchitis     COPD (chronic obstructive pulmonary disease)     Nicotine abuse

## 2020-06-02 LAB
ANION GAP SERPL CALC-SCNC: 4 MMOL/L — LOW (ref 5–17)
BASE EXCESS BLDA CALC-SCNC: 6.4 MMOL/L — HIGH (ref -2–2)
BLOOD GAS COMMENTS ARTERIAL: SIGNIFICANT CHANGE UP
BUN SERPL-MCNC: 21 MG/DL — SIGNIFICANT CHANGE UP (ref 7–23)
CALCIUM SERPL-MCNC: 8 MG/DL — LOW (ref 8.5–10.1)
CHLORIDE SERPL-SCNC: 103 MMOL/L — SIGNIFICANT CHANGE UP (ref 96–108)
CO2 SERPL-SCNC: 29 MMOL/L — SIGNIFICANT CHANGE UP (ref 22–31)
CREAT SERPL-MCNC: 0.71 MG/DL — SIGNIFICANT CHANGE UP (ref 0.5–1.3)
CULTURE RESULTS: NO GROWTH — SIGNIFICANT CHANGE UP
GAS PNL BLDA: SIGNIFICANT CHANGE UP
GLUCOSE SERPL-MCNC: 179 MG/DL — HIGH (ref 70–99)
HCO3 BLDA-SCNC: 32 MMOL/L — HIGH (ref 21–29)
HCT VFR BLD CALC: 38.9 % — LOW (ref 39–50)
HGB BLD-MCNC: 12.4 G/DL — LOW (ref 13–17)
MCHC RBC-ENTMCNC: 31.9 GM/DL — LOW (ref 32–36)
MCHC RBC-ENTMCNC: 33.3 PG — SIGNIFICANT CHANGE UP (ref 27–34)
MCV RBC AUTO: 104.6 FL — HIGH (ref 80–100)
PCO2 BLDA: 52 MMHG — HIGH (ref 32–46)
PH BLDA: 7.41 — SIGNIFICANT CHANGE UP (ref 7.35–7.45)
PLATELET # BLD AUTO: 255 K/UL — SIGNIFICANT CHANGE UP (ref 150–400)
PO2 BLDA: 67 MMHG — LOW (ref 74–108)
POTASSIUM SERPL-MCNC: 4 MMOL/L — SIGNIFICANT CHANGE UP (ref 3.5–5.3)
POTASSIUM SERPL-SCNC: 4 MMOL/L — SIGNIFICANT CHANGE UP (ref 3.5–5.3)
RBC # BLD: 3.72 M/UL — LOW (ref 4.2–5.8)
RBC # FLD: 15.2 % — HIGH (ref 10.3–14.5)
SAO2 % BLDA: 93 % — SIGNIFICANT CHANGE UP (ref 92–96)
SODIUM SERPL-SCNC: 136 MMOL/L — SIGNIFICANT CHANGE UP (ref 135–145)
SPECIMEN SOURCE: SIGNIFICANT CHANGE UP
WBC # BLD: 15.88 K/UL — HIGH (ref 3.8–10.5)
WBC # FLD AUTO: 15.88 K/UL — HIGH (ref 3.8–10.5)

## 2020-06-02 PROCEDURE — 99291 CRITICAL CARE FIRST HOUR: CPT

## 2020-06-02 RX ORDER — ALPRAZOLAM 0.25 MG
0.25 TABLET ORAL ONCE
Refills: 0 | Status: DISCONTINUED | OUTPATIENT
Start: 2020-06-02 | End: 2020-06-02

## 2020-06-02 RX ORDER — NYSTATIN CREAM 100000 [USP'U]/G
1 CREAM TOPICAL EVERY 8 HOURS
Refills: 0 | Status: DISCONTINUED | OUTPATIENT
Start: 2020-06-02 | End: 2020-06-06

## 2020-06-02 RX ORDER — SENNA PLUS 8.6 MG/1
2 TABLET ORAL AT BEDTIME
Refills: 0 | Status: DISCONTINUED | OUTPATIENT
Start: 2020-06-02 | End: 2020-06-06

## 2020-06-02 RX ORDER — CEFTRIAXONE 500 MG/1
1000 INJECTION, POWDER, FOR SOLUTION INTRAMUSCULAR; INTRAVENOUS EVERY 24 HOURS
Refills: 0 | Status: COMPLETED | OUTPATIENT
Start: 2020-06-02 | End: 2020-06-06

## 2020-06-02 RX ORDER — CEFTRIAXONE 500 MG/1
1000 INJECTION, POWDER, FOR SOLUTION INTRAMUSCULAR; INTRAVENOUS EVERY 24 HOURS
Refills: 0 | Status: DISCONTINUED | OUTPATIENT
Start: 2020-06-02 | End: 2020-06-02

## 2020-06-02 RX ADMIN — Medication 40 MILLIGRAM(S): at 05:48

## 2020-06-02 RX ADMIN — PROPOFOL 24.3 MICROGRAM(S)/KG/MIN: 10 INJECTION, EMULSION INTRAVENOUS at 02:43

## 2020-06-02 RX ADMIN — Medication 40 MILLIGRAM(S): at 21:40

## 2020-06-02 RX ADMIN — PROPOFOL 24.3 MICROGRAM(S)/KG/MIN: 10 INJECTION, EMULSION INTRAVENOUS at 10:32

## 2020-06-02 RX ADMIN — AZITHROMYCIN 255 MILLIGRAM(S): 500 TABLET, FILM COATED ORAL at 17:06

## 2020-06-02 RX ADMIN — NYSTATIN CREAM 1 APPLICATION(S): 100000 CREAM TOPICAL at 21:40

## 2020-06-02 RX ADMIN — PANTOPRAZOLE SODIUM 40 MILLIGRAM(S): 20 TABLET, DELAYED RELEASE ORAL at 11:54

## 2020-06-02 RX ADMIN — SODIUM CHLORIDE 100 MILLILITER(S): 9 INJECTION INTRAMUSCULAR; INTRAVENOUS; SUBCUTANEOUS at 09:11

## 2020-06-02 RX ADMIN — PROPOFOL 24.3 MICROGRAM(S)/KG/MIN: 10 INJECTION, EMULSION INTRAVENOUS at 08:29

## 2020-06-02 RX ADMIN — PROPOFOL 24.3 MICROGRAM(S)/KG/MIN: 10 INJECTION, EMULSION INTRAVENOUS at 04:53

## 2020-06-02 RX ADMIN — ENOXAPARIN SODIUM 40 MILLIGRAM(S): 100 INJECTION SUBCUTANEOUS at 17:06

## 2020-06-02 RX ADMIN — PROPOFOL 24.3 MICROGRAM(S)/KG/MIN: 10 INJECTION, EMULSION INTRAVENOUS at 06:34

## 2020-06-02 RX ADMIN — ENOXAPARIN SODIUM 40 MILLIGRAM(S): 100 INJECTION SUBCUTANEOUS at 05:48

## 2020-06-02 RX ADMIN — CHLORHEXIDINE GLUCONATE 1 APPLICATION(S): 213 SOLUTION TOPICAL at 06:03

## 2020-06-02 RX ADMIN — Medication 0.25 MILLIGRAM(S): at 23:02

## 2020-06-02 RX ADMIN — CEFTRIAXONE 1000 MILLIGRAM(S): 500 INJECTION, POWDER, FOR SOLUTION INTRAMUSCULAR; INTRAVENOUS at 13:47

## 2020-06-02 RX ADMIN — CHLORHEXIDINE GLUCONATE 15 MILLILITER(S): 213 SOLUTION TOPICAL at 05:48

## 2020-06-02 RX ADMIN — PROPOFOL 24.3 MICROGRAM(S)/KG/MIN: 10 INJECTION, EMULSION INTRAVENOUS at 00:59

## 2020-06-02 RX ADMIN — Medication 40 MILLIGRAM(S): at 13:47

## 2020-06-02 NOTE — CHART NOTE - NSCHARTNOTEFT_GEN_A_CORE
Patient tolerated CPAP 10/5 this AM. Sedation weaned and patient extubated successfully to 4L/min via NC at 1430.

## 2020-06-02 NOTE — PROGRESS NOTE ADULT - SUBJECTIVE AND OBJECTIVE BOX
Hospital # 1  ICU # 1  Vent # 1    CC:  Respiratory Failure     HPI:    38 y/o male pt of size with COPD and nicotine abuse presents to the ED c/o SOB, coughing and wheezing for the past few days.  He  was intubated shortly after due to lethargy and hypoxia. After intubation he became hypotensive--IVF ordered.  On my exam, vented and sedated,  Hemodynamically stable.  Tm 102.5  WBC 18.  ABG suggestive of acute on chronic hypercapneic resp failure as well as acute type 1 resp failure.  CXR-- Personally reviewed--ETT OK/LLL opacification.  Given CTX/Z in ED.  Pt presently in ICU.  Vent PAP 28-30     :  Pt seen and examined in ICU--vented and sedated--fio2 decreased--Tm 99  WBC 15K.  Case d/w Mom  and All concerns addressed including but not limited to diagnosis, treatment plan and overall prognosis     PMH:  As above.     PSH:  As above.     FH: Non Contributory other than those listed in HPI    Social History:  Unobtainable due to clinical condition     MEDICATIONS  (STANDING):  ALBUTerol    90 MICROgram(s) HFA Inhaler 2 Puff(s) Inhalation Once  azithromycin  IVPB 500 milliGRAM(s) IV Intermittent every 24 hours  cefTRIAXone Injectable. 1000 milliGRAM(s) IV Push every 24 hours  chlorhexidine 0.12% Liquid 15 milliLiter(s) Oral Mucosa every 12 hours  chlorhexidine 4% Liquid 1 Application(s) Topical <User Schedule>  enoxaparin Injectable 40 milliGRAM(s) SubCutaneous every 12 hours  methylPREDNISolone sodium succinate Injectable 40 milliGRAM(s) IV Push every 8 hours  pantoprazole  Injectable 40 milliGRAM(s) IV Push daily  propofol Infusion 30 MICROgram(s)/kG/Min (24.3 mL/Hr) IV Continuous <Continuous>  sodium chloride 0.9%. 1000 milliLiter(s) (100 mL/Hr) IV Continuous <Continuous>  tiotropium 18 MICROgram(s) Capsule 1 Capsule(s) Inhalation daily    MEDICATIONS  (PRN):  ALBUTerol    90 MICROgram(s) HFA Inhaler 2 Puff(s) Inhalation every 4 hours PRN Shortness of Breath      Allergies: NKDA    ROS:  SEE BELOW      Weight (kg): 135 ( @ 19:00)    ICU Vital Signs Last 24 Hrs  T(C): 37.1 (2020 10:00), Max: 37.6 (2020 17:00)  T(F): 98.8 (2020 10:00), Max: 99.6 (2020 17:00)  HR: 70 (2020 10:00) (65 - 110)  BP: 120/66 (2020 10:00) (103/66 - 149/82)  BP(mean): 77 (2020 10:00) (75 - 101)  ABP: --  ABP(mean): --  RR: 25 (2020 10:00) (14 - 26)  SpO2: 93% (2020 10:00) (90% - 99%)      Mode: AC/ CMV (Assist Control/ Continuous Mandatory Ventilation)  RR (machine): 18  TV (machine): 450  FiO2: 45  PEEP: 10  ITime: 1  MAP: 15  PIP: 23      I&O's Summary    2020 07:01  -  2020 07:00  --------------------------------------------------------  IN: 2232 mL / OUT: 1350 mL / NET: 882 mL        Physical Exam:  SEE BELOW                          12.4   15.88 )-----------( 255      ( 2020 07:35 )             38.9       06-02    136  |  103  |  21  ----------------------------<  179<H>  4.0   |  29  |  0.71    Ca    8.0<L>      2020 07:36    TPro  7.3  /  Alb  3.4  /  TBili  0.4  /  DBili  x   /  AST  40<H>  /  ALT  118<H>  /  AlkPhos  105  06-01            ABG - ( 2020 08:13 )  pH, Arterial: 7.41  pH, Blood: x     /  pCO2: 52    /  pO2: 67    / HCO3: 32    / Base Excess: 6.4   /  SaO2: 93                  Urinalysis Basic - ( 2020 14:05 )    Color: Chey / Appearance: Clear / S.025 / pH: x  Gluc: x / Ketone: Negative  / Bili: Negative / Urobili: 1 mg/dL   Blood: x / Protein: 30 mg/dL / Nitrite: Negative   Leuk Esterase: Negative / RBC: Negative /HPF / WBC 0-2   Sq Epi: x / Non Sq Epi: Few / Bacteria: x        DVT Prophylaxis:                                                            Contraindication:     Advanced Directives:    Discussed with:    Visit Information:  Time spent excluding procedure:  45 cc mins    ** Time is exclusive of billed procedures and/or teaching and/or routine family updates.

## 2020-06-02 NOTE — PROGRESS NOTE ADULT - ASSESSMENT
IMP:    38 y/o male pt of size, possible substance abuse and COPD admitted with acute on chronic type 2 resp failure and acute type 1 resp failure and LLL CAP sepsis and Leukocytosis.  + Benzo UTox--pt has Rx for Benzo for sleep    High risk for acute decompensation and deterioration including death while on life support    Plan:    Full vent support--LPV strategy to maintain plateau pressures < 30--High PEEP/low TV--Permissive hypercapnea and acidemia--Decrease FIO2 and PEEP--not ready for weaning today  Cont with CTX/Z (2)  NPO   IVF   HOB > 30  DVT prophy--SCD and BID LMWH    ICU care-- d/w ICU staff on multi disciplinary rounds IMP:    40 y/o male pt of size, possible substance abuse and COPD admitted with acute on chronic type 2 resp failure and acute type 1 resp failure and LLL CAP sepsis and Leukocytosis.  + Benzo UTox--pt has Rx for Benzo for sleep    High risk for acute decompensation and deterioration including death while on life support    Plan:    Full vent support--LPV strategy to maintain plateau pressures < 30--High PEEP/low TV--Permissive hypercapnea and acidemia--Decrease FIO2 and PEEP--SBT today  Cont with CTX/Z (2)  Steroids and BD   NPO   IVF   HOB > 30  DVT prophy--SCD and BID LMWH    ICU care-- d/w ICU staff on multi disciplinary rounds

## 2020-06-02 NOTE — PROGRESS NOTE ADULT - ASSESSMENT
39 year old M w/     1. Acute hypoxic respiratory failure with acute on chronic hypercapnic respiratory failure.   2. 39 year old M w/     1. Acute hypoxic respiratory failure with acute on chronic hypercapnic respiratory failure.   2. COPD exacerbation 2/2 Community Acquired PNA  3. Substance abuse disorder (Nitrous oxide)    Plan  Cardio- no issues, continue monitoring in ICU    Resp: Continue mechanical ventilation- will attempt trial of PS/SBT today. Continue tiotropium and Albuterol PRN. Continue Solumederol 40mg q8 today.     GI: NPO with medication. PPI IV daily for stress ulcer prevention while intubated.    Endo: No issues    : straight cath as needed for PVR > 350cc    Renal: no issues    ID: Continue Ceftriaxone and Azithromycin for treatment of CAP (started 6/1)    Neuro: Wean sedation if possible. 39 year old M w/ COPD admitted to  f    1. Acute hypoxic respiratory failure with acute on chronic hypercapnic respiratory failure.   2. COPD exacerbation 2/2 Community Acquired PNA  3. Substance abuse disorder (Nitrous oxide)    Plan  Cardio- no issues, continue monitoring in ICU    Resp: Continue mechanical ventilation- will attempt trial of PS/SBT today.  Continue tiotropium and Albuterol PRN. Continue Solumederol 40mg q8 today.     GI: NPO with medication. PPI IV daily for stress ulcer prevention while intubated.    Endo: No issues    : straight cath as needed for PVR > 350cc    Renal: no issues    ID: Continue Ceftriaxone and Azithromycin for treatment of CAP (started 6/1)    Neuro: Wean sedation if possible. 39 year old M w/ COPD admitted to  for treatment of COPD exacerbation insetting of CAP, now requiring mechanical ventilation.     1. Acute hypoxic respiratory failure with acute on chronic hypercapnic respiratory failure.   2. COPD exacerbation 2/2 Community Acquired PNA  3. Substance abuse disorder (Nitrous oxide)    Plan  Cardio- no issues, continue monitoring in ICU    Resp: Continue mechanical ventilation- will attempt trial of PS/SBT today.  Continue tiotropium and Albuterol PRN. Continue Solumederol 40mg q8 today.     GI: NPO with medication. PPI IV daily for stress ulcer prevention while intubated.    Endo: No issues    : straight cath as needed for PVR > 350cc    Renal: no issues    ID: Continue Ceftriaxone and Azithromycin for treatment of CAP (started 6/1)    Neuro: Wean sedation if possible.

## 2020-06-02 NOTE — PROGRESS NOTE ADULT - SUBJECTIVE AND OBJECTIVE BOX
HD # 1  ICU Day # 1  Vent day # 1    CHIEF COMPLAINT:    SUBJECTIVE:     REVIEW OF SYSTEMS:  CONSTITUTIONAL: No weakness, fevers or chills  EYES/ENT: No visual changes;  No vertigo or throat pain   NECK: No pain or stiffness  RESPIRATORY: No cough, wheezing, hemoptysis; No shortness of breath  CARDIOVASCULAR: No chest pain or palpitations  GASTROINTESTINAL: No abdominal or epigastric pain. No nausea, vomiting, or hematemesis; No diarrhea or constipation. No melena or hematochezia.  GENITOURINARY: No dysuria, frequency or hematuria  NEUROLOGICAL: No numbness or weakness  SKIN: No itching, burning, rashes, or lesions   All other review of systems is negative unless indicated above    Vital Signs Last 24 Hrs  T(C): 37.1 (02 Jun 2020 10:00), Max: 37.6 (01 Jun 2020 17:00)  T(F): 98.8 (02 Jun 2020 10:00), Max: 99.6 (01 Jun 2020 17:00)  HR: 70 (02 Jun 2020 10:00) (65 - 102)  BP: 120/66 (02 Jun 2020 10:00) (103/66 - 149/82)  BP(mean): 77 (02 Jun 2020 10:00) (75 - 101)  RR: 25 (02 Jun 2020 10:00) (14 - 26)  SpO2: 93% (02 Jun 2020 10:00) (90% - 99%)    I&O's Summary    01 Jun 2020 07:01  -  02 Jun 2020 07:00  --------------------------------------------------------  IN: 2232 mL / OUT: 1350 mL / NET: 882 mL        CAPILLARY BLOOD GLUCOSE          PHYSICAL EXAM:  Constitutional: NAD, awake and alert, well-developed  HEENT: PERR, EOMI, Normal Hearing, MMM  Neck: Soft and supple, No LAD, No JVD  Respiratory: Breath sounds are clear bilaterally, No wheezing, rales or rhonchi  Cardiovascular: S1 and S2, regular rate and rhythm, no Murmurs, gallops or rubs  Gastrointestinal: Bowel Sounds present, soft, nontender, nondistended, no guarding, no rebound  Extremities: No peripheral edema  Vascular: 2+ peripheral pulses  Neurological: A/O x 3, no focal deficits  Musculoskeletal: 5/5 strength b/l upper and lower extremities  Skin: No rashes    MEDICATIONS:  MEDICATIONS  (STANDING):  ALBUTerol    90 MICROgram(s) HFA Inhaler 2 Puff(s) Inhalation Once  azithromycin  IVPB 500 milliGRAM(s) IV Intermittent every 24 hours  cefTRIAXone Injectable. 1000 milliGRAM(s) IV Push every 24 hours  chlorhexidine 0.12% Liquid 15 milliLiter(s) Oral Mucosa every 12 hours  chlorhexidine 4% Liquid 1 Application(s) Topical <User Schedule>  enoxaparin Injectable 40 milliGRAM(s) SubCutaneous every 12 hours  methylPREDNISolone sodium succinate Injectable 40 milliGRAM(s) IV Push every 8 hours  pantoprazole  Injectable 40 milliGRAM(s) IV Push daily  propofol Infusion 30 MICROgram(s)/kG/Min (24.3 mL/Hr) IV Continuous <Continuous>  sodium chloride 0.9%. 1000 milliLiter(s) (100 mL/Hr) IV Continuous <Continuous>  tiotropium 18 MICROgram(s) Capsule 1 Capsule(s) Inhalation daily    MEDICATIONS  (PRN):  ALBUTerol    90 MICROgram(s) HFA Inhaler 2 Puff(s) Inhalation every 4 hours PRN Shortness of Breath      LABS: All Labs Reviewed:                        12.4   15.88 )-----------( 255      ( 02 Jun 2020 07:35 )             38.9     06-02    136  |  103  |  21  ----------------------------<  179<H>  4.0   |  29  |  0.71    Ca    8.0<L>      02 Jun 2020 07:36    TPro  7.3  /  Alb  3.4  /  TBili  0.4  /  DBili  x   /  AST  40<H>  /  ALT  118<H>  /  AlkPhos  105  06-01    PT/INR - ( 01 Jun 2020 09:58 )   PT: 11.6 sec;   INR: 1.04 ratio         PTT - ( 01 Jun 2020 09:58 )  PTT:26.9 sec HD # 1  ICU Day # 1  Vent day # 1      SUBJECTIVE: No acute     REVIEW OF SYSTEMS:  CONSTITUTIONAL: No weakness, fevers or chills  EYES/ENT: No visual changes;  No vertigo or throat pain   NECK: No pain or stiffness  RESPIRATORY: No cough, wheezing, hemoptysis; No shortness of breath  CARDIOVASCULAR: No chest pain or palpitations  GASTROINTESTINAL: No abdominal or epigastric pain. No nausea, vomiting, or hematemesis; No diarrhea or constipation. No melena or hematochezia.  GENITOURINARY: No dysuria, frequency or hematuria  NEUROLOGICAL: No numbness or weakness  SKIN: No itching, burning, rashes, or lesions   All other review of systems is negative unless indicated above    Vital Signs Last 24 Hrs  T(C): 37.1 (02 Jun 2020 10:00), Max: 37.6 (01 Jun 2020 17:00)  T(F): 98.8 (02 Jun 2020 10:00), Max: 99.6 (01 Jun 2020 17:00)  HR: 70 (02 Jun 2020 10:00) (65 - 102)  BP: 120/66 (02 Jun 2020 10:00) (103/66 - 149/82)  BP(mean): 77 (02 Jun 2020 10:00) (75 - 101)  RR: 25 (02 Jun 2020 10:00) (14 - 26)  SpO2: 93% (02 Jun 2020 10:00) (90% - 99%)    I&O's Summary    01 Jun 2020 07:01  -  02 Jun 2020 07:00  --------------------------------------------------------  IN: 2232 mL / OUT: 1350 mL / NET: 882 mL        CAPILLARY BLOOD GLUCOSE          PHYSICAL EXAM:  Constitutional: NAD, awake and alert, well-developed  HEENT: PERR, EOMI, Normal Hearing, MMM  Neck: Soft and supple, No LAD, No JVD  Respiratory: Breath sounds are clear bilaterally, No wheezing, rales or rhonchi  Cardiovascular: S1 and S2, regular rate and rhythm, no Murmurs, gallops or rubs  Gastrointestinal: Bowel Sounds present, soft, nontender, nondistended, no guarding, no rebound  Extremities: No peripheral edema  Vascular: 2+ peripheral pulses  Neurological: A/O x 3, no focal deficits  Musculoskeletal: 5/5 strength b/l upper and lower extremities  Skin: No rashes    MEDICATIONS:  MEDICATIONS  (STANDING):  ALBUTerol    90 MICROgram(s) HFA Inhaler 2 Puff(s) Inhalation Once  azithromycin  IVPB 500 milliGRAM(s) IV Intermittent every 24 hours  cefTRIAXone Injectable. 1000 milliGRAM(s) IV Push every 24 hours  chlorhexidine 0.12% Liquid 15 milliLiter(s) Oral Mucosa every 12 hours  chlorhexidine 4% Liquid 1 Application(s) Topical <User Schedule>  enoxaparin Injectable 40 milliGRAM(s) SubCutaneous every 12 hours  methylPREDNISolone sodium succinate Injectable 40 milliGRAM(s) IV Push every 8 hours  pantoprazole  Injectable 40 milliGRAM(s) IV Push daily  propofol Infusion 30 MICROgram(s)/kG/Min (24.3 mL/Hr) IV Continuous <Continuous>  sodium chloride 0.9%. 1000 milliLiter(s) (100 mL/Hr) IV Continuous <Continuous>  tiotropium 18 MICROgram(s) Capsule 1 Capsule(s) Inhalation daily    MEDICATIONS  (PRN):  ALBUTerol    90 MICROgram(s) HFA Inhaler 2 Puff(s) Inhalation every 4 hours PRN Shortness of Breath      LABS: All Labs Reviewed:                        12.4   15.88 )-----------( 255      ( 02 Jun 2020 07:35 )             38.9     06-02    136  |  103  |  21  ----------------------------<  179<H>  4.0   |  29  |  0.71    Ca    8.0<L>      02 Jun 2020 07:36    TPro  7.3  /  Alb  3.4  /  TBili  0.4  /  DBili  x   /  AST  40<H>  /  ALT  118<H>  /  AlkPhos  105  06-01    PT/INR - ( 01 Jun 2020 09:58 )   PT: 11.6 sec;   INR: 1.04 ratio         PTT - ( 01 Jun 2020 09:58 )  PTT:26.9 sec HD # 1  ICU Day # 1  Vent day # 1      SUBJECTIVE: Overnight patient required increased amount of sedation.     REVIEW OF SYSTEMS: unable to assess as patient is intubated and sedated.    Vital Signs Last 24 Hrs  T(C): 37.1 (02 Jun 2020 10:00), Max: 37.6 (01 Jun 2020 17:00)  T(F): 98.8 (02 Jun 2020 10:00), Max: 99.6 (01 Jun 2020 17:00)  HR: 70 (02 Jun 2020 10:00) (65 - 102)  BP: 120/66 (02 Jun 2020 10:00) (103/66 - 149/82)  BP(mean): 77 (02 Jun 2020 10:00) (75 - 101)  RR: 25 (02 Jun 2020 10:00) (14 - 26)  SpO2: 93% (02 Jun 2020 10:00) (90% - 99%)    I&O's Summary    01 Jun 2020 07:01  -  02 Jun 2020 07:00  --------------------------------------------------------  IN: 2232 mL / OUT: 1350 mL / NET: 882 mL    PHYSICAL EXAM:  Constitutional: NAD, overweight, sedated  HEENT: endotracheal tube and OG tube in place  Respiratory: Breath sounds are clear bilaterally, No wheezing, rales or rhonchi  Cardiovascular: S1 and S2, regular rate and rhythm  Gastrointestinal: Bowel Sounds present, soft, nondistended, no guarding, no rebound  Extremities: No peripheral edema  Vascular: 2+ peripheral pulses  Neurological: A/O x 3, no focal deficits  Musculoskeletal: 5/5 strength b/l upper and lower extremities  Skin: No rashes    MEDICATIONS:  MEDICATIONS  (STANDING):  ALBUTerol    90 MICROgram(s) HFA Inhaler 2 Puff(s) Inhalation Once  azithromycin  IVPB 500 milliGRAM(s) IV Intermittent every 24 hours  cefTRIAXone Injectable. 1000 milliGRAM(s) IV Push every 24 hours  chlorhexidine 0.12% Liquid 15 milliLiter(s) Oral Mucosa every 12 hours  chlorhexidine 4% Liquid 1 Application(s) Topical <User Schedule>  enoxaparin Injectable 40 milliGRAM(s) SubCutaneous every 12 hours  methylPREDNISolone sodium succinate Injectable 40 milliGRAM(s) IV Push every 8 hours  pantoprazole  Injectable 40 milliGRAM(s) IV Push daily  propofol Infusion 30 MICROgram(s)/kG/Min (24.3 mL/Hr) IV Continuous <Continuous>  sodium chloride 0.9%. 1000 milliLiter(s) (100 mL/Hr) IV Continuous <Continuous>  tiotropium 18 MICROgram(s) Capsule 1 Capsule(s) Inhalation daily    MEDICATIONS  (PRN):  ALBUTerol    90 MICROgram(s) HFA Inhaler 2 Puff(s) Inhalation every 4 hours PRN Shortness of Breath      LABS: All Labs Reviewed:                        12.4   15.88 )-----------( 255      ( 02 Jun 2020 07:35 )             38.9     06-02    136  |  103  |  21  ----------------------------<  179<H>  4.0   |  29  |  0.71    Ca    8.0<L>      02 Jun 2020 07:36    TPro  7.3  /  Alb  3.4  /  TBili  0.4  /  DBili  x   /  AST  40<H>  /  ALT  118<H>  /  AlkPhos  105  06-01    PT/INR - ( 01 Jun 2020 09:58 )   PT: 11.6 sec;   INR: 1.04 ratio         PTT - ( 01 Jun 2020 09:58 )  PTT:26.9 sec HD # 1  ICU Day # 1  Vent day # 1    SUBJECTIVE: Overnight patient required increased amount of sedation.     REVIEW OF SYSTEMS: unable to assess as patient is intubated and sedated.    Vital Signs Last 24 Hrs  T(C): 37.1 (02 Jun 2020 10:00), Max: 37.6 (01 Jun 2020 17:00)  T(F): 98.8 (02 Jun 2020 10:00), Max: 99.6 (01 Jun 2020 17:00)  HR: 70 (02 Jun 2020 10:00) (65 - 102)  BP: 120/66 (02 Jun 2020 10:00) (103/66 - 149/82)  BP(mean): 77 (02 Jun 2020 10:00) (75 - 101)  RR: 25 (02 Jun 2020 10:00) (14 - 26)  SpO2: 93% (02 Jun 2020 10:00) (90% - 99%)    I&O's Summary    01 Jun 2020 07:01  -  02 Jun 2020 07:00  --------------------------------------------------------  IN: 2232 mL / OUT: 1350 mL / NET: 882 mL    PHYSICAL EXAM:  Constitutional: NAD, overweight, sedated  HEENT: endotracheal tube and OG tube in place  Respiratory: Breath sounds are clear bilaterally  Cardiovascular: S1 and S2, regular rate and rhythm  Gastrointestinal: Bowel Sounds present, soft, nondistended  Extremities: No peripheral edema  Neurological: sedated, occasional opening eyes to verbal stimuli  Musculoskeletal: 5/5 strength b/l upper extremities; moving all extremities spontaneously  Skin: No rashes noted     MEDICATIONS:  MEDICATIONS  (STANDING):  ALBUTerol    90 MICROgram(s) HFA Inhaler 2 Puff(s) Inhalation Once  azithromycin  IVPB 500 milliGRAM(s) IV Intermittent every 24 hours  cefTRIAXone Injectable. 1000 milliGRAM(s) IV Push every 24 hours  chlorhexidine 0.12% Liquid 15 milliLiter(s) Oral Mucosa every 12 hours  chlorhexidine 4% Liquid 1 Application(s) Topical <User Schedule>  enoxaparin Injectable 40 milliGRAM(s) SubCutaneous every 12 hours  methylPREDNISolone sodium succinate Injectable 40 milliGRAM(s) IV Push every 8 hours  pantoprazole  Injectable 40 milliGRAM(s) IV Push daily  propofol Infusion 30 MICROgram(s)/kG/Min (24.3 mL/Hr) IV Continuous <Continuous>  sodium chloride 0.9%. 1000 milliLiter(s) (100 mL/Hr) IV Continuous <Continuous>  tiotropium 18 MICROgram(s) Capsule 1 Capsule(s) Inhalation daily    MEDICATIONS  (PRN):  ALBUTerol    90 MICROgram(s) HFA Inhaler 2 Puff(s) Inhalation every 4 hours PRN Shortness of Breath      LABS: All Labs Reviewed:                        12.4   15.88 )-----------( 255      ( 02 Jun 2020 07:35 )             38.9     06-02    136  |  103  |  21  ----------------------------<  179<H>  4.0   |  29  |  0.71    Ca    8.0<L>      02 Jun 2020 07:36    TPro  7.3  /  Alb  3.4  /  TBili  0.4  /  DBili  x   /  AST  40<H>  /  ALT  118<H>  /  AlkPhos  105  06-01    PT/INR - ( 01 Jun 2020 09:58 )   PT: 11.6 sec;   INR: 1.04 ratio         PTT - ( 01 Jun 2020 09:58 )  PTT:26.9 sec

## 2020-06-03 LAB
ANION GAP SERPL CALC-SCNC: 3 MMOL/L — LOW (ref 5–17)
BUN SERPL-MCNC: 22 MG/DL — SIGNIFICANT CHANGE UP (ref 7–23)
CALCIUM SERPL-MCNC: 8.3 MG/DL — LOW (ref 8.5–10.1)
CHLORIDE SERPL-SCNC: 107 MMOL/L — SIGNIFICANT CHANGE UP (ref 96–108)
CO2 SERPL-SCNC: 31 MMOL/L — SIGNIFICANT CHANGE UP (ref 22–31)
CREAT SERPL-MCNC: 0.67 MG/DL — SIGNIFICANT CHANGE UP (ref 0.5–1.3)
GLUCOSE SERPL-MCNC: 135 MG/DL — HIGH (ref 70–99)
HCT VFR BLD CALC: 39.9 % — SIGNIFICANT CHANGE UP (ref 39–50)
HGB BLD-MCNC: 12.7 G/DL — LOW (ref 13–17)
MAGNESIUM SERPL-MCNC: 2.6 MG/DL — SIGNIFICANT CHANGE UP (ref 1.6–2.6)
MCHC RBC-ENTMCNC: 31.8 GM/DL — LOW (ref 32–36)
MCHC RBC-ENTMCNC: 32.5 PG — SIGNIFICANT CHANGE UP (ref 27–34)
MCV RBC AUTO: 102 FL — HIGH (ref 80–100)
PHOSPHATE SERPL-MCNC: 3.4 MG/DL — SIGNIFICANT CHANGE UP (ref 2.5–4.5)
PLATELET # BLD AUTO: 238 K/UL — SIGNIFICANT CHANGE UP (ref 150–400)
POTASSIUM SERPL-MCNC: 4.1 MMOL/L — SIGNIFICANT CHANGE UP (ref 3.5–5.3)
POTASSIUM SERPL-SCNC: 4.1 MMOL/L — SIGNIFICANT CHANGE UP (ref 3.5–5.3)
RBC # BLD: 3.91 M/UL — LOW (ref 4.2–5.8)
RBC # FLD: 15.1 % — HIGH (ref 10.3–14.5)
SODIUM SERPL-SCNC: 141 MMOL/L — SIGNIFICANT CHANGE UP (ref 135–145)
WBC # BLD: 15.29 K/UL — HIGH (ref 3.8–10.5)
WBC # FLD AUTO: 15.29 K/UL — HIGH (ref 3.8–10.5)

## 2020-06-03 PROCEDURE — 99232 SBSQ HOSP IP/OBS MODERATE 35: CPT

## 2020-06-03 PROCEDURE — 93306 TTE W/DOPPLER COMPLETE: CPT | Mod: 26

## 2020-06-03 RX ORDER — ALPRAZOLAM 0.25 MG
0.25 TABLET ORAL ONCE
Refills: 0 | Status: DISCONTINUED | OUTPATIENT
Start: 2020-06-03 | End: 2020-06-04

## 2020-06-03 RX ORDER — AZITHROMYCIN 500 MG/1
500 TABLET, FILM COATED ORAL DAILY
Refills: 0 | Status: COMPLETED | OUTPATIENT
Start: 2020-06-03 | End: 2020-06-05

## 2020-06-03 RX ORDER — ALPRAZOLAM 0.25 MG
0.25 TABLET ORAL AT BEDTIME
Refills: 0 | Status: DISCONTINUED | OUTPATIENT
Start: 2020-06-03 | End: 2020-06-03

## 2020-06-03 RX ORDER — ALPRAZOLAM 0.25 MG
0.25 TABLET ORAL ONCE
Refills: 0 | Status: DISCONTINUED | OUTPATIENT
Start: 2020-06-03 | End: 2020-06-03

## 2020-06-03 RX ORDER — NICOTINE POLACRILEX 2 MG
1 GUM BUCCAL DAILY
Refills: 0 | Status: DISCONTINUED | OUTPATIENT
Start: 2020-06-03 | End: 2020-06-06

## 2020-06-03 RX ADMIN — ENOXAPARIN SODIUM 40 MILLIGRAM(S): 100 INJECTION SUBCUTANEOUS at 05:03

## 2020-06-03 RX ADMIN — AZITHROMYCIN 500 MILLIGRAM(S): 500 TABLET, FILM COATED ORAL at 11:17

## 2020-06-03 RX ADMIN — ENOXAPARIN SODIUM 40 MILLIGRAM(S): 100 INJECTION SUBCUTANEOUS at 17:12

## 2020-06-03 RX ADMIN — Medication 1 PATCH: at 09:03

## 2020-06-03 RX ADMIN — Medication 20 MILLIGRAM(S): at 20:35

## 2020-06-03 RX ADMIN — CEFTRIAXONE 1000 MILLIGRAM(S): 500 INJECTION, POWDER, FOR SOLUTION INTRAMUSCULAR; INTRAVENOUS at 14:28

## 2020-06-03 RX ADMIN — Medication 0.25 MILLIGRAM(S): at 11:16

## 2020-06-03 RX ADMIN — Medication 20 MILLIGRAM(S): at 14:29

## 2020-06-03 RX ADMIN — Medication 40 MILLIGRAM(S): at 05:01

## 2020-06-03 RX ADMIN — NYSTATIN CREAM 1 APPLICATION(S): 100000 CREAM TOPICAL at 05:03

## 2020-06-03 RX ADMIN — Medication 0.25 MILLIGRAM(S): at 20:35

## 2020-06-03 RX ADMIN — Medication 1 PATCH: at 19:36

## 2020-06-03 NOTE — PROVIDER CONTACT NOTE (OTHER) - SITUATION
Pt admitted with COPD exacerbation due to CAP s/p extubation. Had syncopal episode ~6am associated with bradycardia.

## 2020-06-03 NOTE — PROGRESS NOTE ADULT - SUBJECTIVE AND OBJECTIVE BOX
SUBJECTIVE: Overnight patient had episodes of bradycardia. At ~ 600 patient was returning from bathroom he began to fill dizzy    REVIEW OF SYSTEMS:  CONSTITUTIONAL: No weakness, fevers or chills  EYES/ENT: No visual changes;  No vertigo or throat pain   NECK: No pain or stiffness  RESPIRATORY: No cough, wheezing, hemoptysis; No shortness of breath  CARDIOVASCULAR: No chest pain or palpitations  GASTROINTESTINAL: No abdominal or epigastric pain. No nausea, vomiting, or hematemesis; No diarrhea or constipation. No melena or hematochezia.  GENITOURINARY: No dysuria, frequency or hematuria  NEUROLOGICAL: No numbness or weakness  SKIN: No itching, burning, rashes, or lesions   All other review of systems is negative unless indicated above    Vital Signs Last 24 Hrs  T(C): 36.7 (03 Jun 2020 09:00), Max: 37.6 (02 Jun 2020 14:00)  T(F): 98 (03 Jun 2020 09:00), Max: 99.6 (02 Jun 2020 14:00)  HR: 57 (03 Jun 2020 11:00) (53 - 84)  BP: 118/66 (03 Jun 2020 11:00) (96/69 - 151/86)  BP(mean): 72 (03 Jun 2020 11:00) (61 - 99)  RR: 18 (03 Jun 2020 11:00) (12 - 26)  SpO2: 93% (03 Jun 2020 10:00) (89% - 100%)    I&O's Summary    02 Jun 2020 07:01  -  03 Jun 2020 07:00  --------------------------------------------------------  IN: 2132 mL / OUT: 2375 mL / NET: -243 mL        CAPILLARY BLOOD GLUCOSE          PHYSICAL EXAM:  Constitutional: NAD, awake and alert, well-developed  HEENT: PERR, EOMI, Normal Hearing, MMM  Neck: Soft and supple, No LAD, No JVD  Respiratory: Breath sounds are clear bilaterally, No wheezing, rales or rhonchi  Cardiovascular: S1 and S2, regular rate and rhythm, no Murmurs, gallops or rubs  Gastrointestinal: Bowel Sounds present, soft, nontender, nondistended, no guarding, no rebound  Extremities: No peripheral edema  Vascular: 2+ peripheral pulses  Neurological: A/O x 3, no focal deficits  Musculoskeletal: 5/5 strength b/l upper and lower extremities  Skin: No rashes    MEDICATIONS:  MEDICATIONS  (STANDING):  ALBUTerol    90 MICROgram(s) HFA Inhaler 2 Puff(s) Inhalation Once  azithromycin   Tablet 500 milliGRAM(s) Oral daily  cefTRIAXone Injectable. 1000 milliGRAM(s) IV Push every 24 hours  enoxaparin Injectable 40 milliGRAM(s) SubCutaneous every 12 hours  methylPREDNISolone sodium succinate Injectable 20 milliGRAM(s) IV Push every 8 hours  nicotine - 21 mG/24Hr(s) Patch 1 patch Transdermal daily  nystatin Powder 1 Application(s) Topical every 8 hours  senna 2 Tablet(s) Oral at bedtime  tiotropium 18 MICROgram(s) Capsule 1 Capsule(s) Inhalation daily    MEDICATIONS  (PRN):  ALBUTerol    90 MICROgram(s) HFA Inhaler 2 Puff(s) Inhalation every 4 hours PRN Shortness of Breath      LABS: All Labs Reviewed:                        12.7   15.29 )-----------( 238      ( 03 Jun 2020 06:25 )             39.9     06-03    141  |  107  |  22  ----------------------------<  135<H>  4.1   |  31  |  0.67    Ca    8.3<L>      03 Jun 2020 06:25  Phos  3.4     06-03  Mg     2.6     06-03            Blood Culture: 06-01 @ 14:05  Organism --  Gram Stain Blood -- Gram Stain --  Specimen Source .Urine Clean Catch (Midstream)  Culture-Blood --    06-01 @ 09:58  Organism --  Gram Stain Blood -- Gram Stain --  Specimen Source .Blood Blood-Peripheral  Culture-Blood --        RADIOLOGY/EKG:    DVT PPX:    ADVANCED DIRECTIVE:    DISPOSITION: SUBJECTIVE: Overnight patient had episodes of bradycardia. At ~ 600 patient was returning from bathroom he began to fill dizzy and synopsized for ~ 10 seconds before regaining consciousness on his own.     REVIEW OF SYSTEMS:  CONSTITUTIONAL: No weakness, fevers or chills  EYES/ENT: No visual changes;  No vertigo or throat pain   NECK: No pain or stiffness  RESPIRATORY: No cough, wheezing, hemoptysis; + shortness of breath on exertion  CARDIOVASCULAR: No chest pain or palpitations  GASTROINTESTINAL: No abdominal or epigastric pain. No nausea, vomiting, or hematemesis; No diarrhea or constipation. No melena or hematochezia.  GENITOURINARY: No dysuria, frequency or hematuria  NEUROLOGICAL: No numbness or weakness  SKIN: No itching, burning, rashes, or lesions   All other review of systems is negative unless indicated above    Vital Signs Last 24 Hrs  T(C): 36.7 (03 Jun 2020 09:00), Max: 37.6 (02 Jun 2020 14:00)  T(F): 98 (03 Jun 2020 09:00), Max: 99.6 (02 Jun 2020 14:00)  HR: 57 (03 Jun 2020 11:00) (53 - 84)  BP: 118/66 (03 Jun 2020 11:00) (96/69 - 151/86)  BP(mean): 72 (03 Jun 2020 11:00) (61 - 99)  RR: 18 (03 Jun 2020 11:00) (12 - 26)  SpO2: 93% (03 Jun 2020 10:00) (89% - 100%)    I&O's Summary    02 Jun 2020 07:01  -  03 Jun 2020 07:00  --------------------------------------------------------  IN: 2132 mL / OUT: 2375 mL / NET: -243 mL    PHYSICAL EXAM:  Constitutional: NAD, awake and alert, well-developed  HEENT: PERR, EOMI, Normal Hearing, MMM  Neck: Soft and supple, No LAD, No JVD  Respiratory: Breath sounds are clear bilaterally, No wheezing, rales or rhonchi  Cardiovascular: S1 and S2, regular rate and rhythm, no Murmurs, gallops or rubs  Gastrointestinal: Bowel Sounds present, soft, nontender, nondistended, no guarding, no rebound  Extremities: No peripheral edema  Vascular: 2+ peripheral pulses  Neurological: A/O x 3, no focal deficits  Musculoskeletal: 5/5 strength b/l upper and lower extremities  Skin: No rashes    MEDICATIONS:  MEDICATIONS  (STANDING):  ALBUTerol    90 MICROgram(s) HFA Inhaler 2 Puff(s) Inhalation Once  azithromycin   Tablet 500 milliGRAM(s) Oral daily  cefTRIAXone Injectable. 1000 milliGRAM(s) IV Push every 24 hours  enoxaparin Injectable 40 milliGRAM(s) SubCutaneous every 12 hours  methylPREDNISolone sodium succinate Injectable 20 milliGRAM(s) IV Push every 8 hours  nicotine - 21 mG/24Hr(s) Patch 1 patch Transdermal daily  nystatin Powder 1 Application(s) Topical every 8 hours  senna 2 Tablet(s) Oral at bedtime  tiotropium 18 MICROgram(s) Capsule 1 Capsule(s) Inhalation daily    MEDICATIONS  (PRN):  ALBUTerol    90 MICROgram(s) HFA Inhaler 2 Puff(s) Inhalation every 4 hours PRN Shortness of Breath      LABS: All Labs Reviewed:                        12.7   15.29 )-----------( 238      ( 03 Jun 2020 06:25 )             39.9     06-03    141  |  107  |  22  ----------------------------<  135<H>  4.1   |  31  |  0.67    Ca    8.3<L>      03 Jun 2020 06:25  Phos  3.4     06-03  Mg     2.6     06-03    Culture - Blood (06.01.20 @ 09:58)    Specimen Source: .Blood Blood-Peripheral    Culture Results:   No growth to date.    Culture - Blood (06.01.20 @ 09:58)    Specimen Source: .Blood Blood-Peripheral    Culture Results:   No growth to date.    Culture - Urine (06.01.20 @ 14:05)    Specimen Source: .Urine Clean Catch (Midstream)    Culture Results:   No growth

## 2020-06-03 NOTE — PROGRESS NOTE ADULT - ASSESSMENT
39 year old M w/ COPD admitted to  for treatment of COPD exacerbation insetting of CAP, now requiring mechanical ventilation.     1. Acute hypoxic respiratory failure with acute on chronic hypercapnic respiratory failure.   2. COPD exacerbation 2/2 Community Acquired PNA  3. Substance abuse disorder (Nitrous oxide)  4. Episodic Bradycardia    Plan  Cardio-f/u Echo. Continue tele monitoring.  Will likely need outpatient loop recorder.    Resp: Extubated 6/2/20. Continue tiotropium and Albuterol PRN. Continue Solumedrol 20mg q8 today.     GI: d/c PPI. TLC/DASH diet.    Endo: No issues    : no issues    Renal: no issues    ID: Continue Ceftriaxone and Azithromycin for treatment of CAP (started 6/1)    Neuro: Monitor for dizziness or AMS - signs for possible withdrawal.

## 2020-06-03 NOTE — PROGRESS NOTE ADULT - SUBJECTIVE AND OBJECTIVE BOX
Long pause leading to syncope.  He was awake and not obstructed sleeping  prior to the syncope.    Tele reviewed at least 8 seconds.      There was no documented hypoxemia though he kept pulling off his oximeter.      He is on no AVN blockers     WIll ask EP to see

## 2020-06-03 NOTE — PROGRESS NOTE ADULT - SUBJECTIVE AND OBJECTIVE BOX
Hospital # 2  ICU # 2  Vent # 2    CC:  Respiratory Failure     HPI:    40 y/o male pt of size with COPD and nicotine abuse presents to the ED c/o SOB, coughing and wheezing for the past few days.  He  was intubated shortly after due to lethargy and hypoxia. After intubation he became hypotensive--IVF ordered.  On my exam, vented and sedated,  Hemodynamically stable.  Tm 102.5  WBC 18.  ABG suggestive of acute on chronic hypercapneic resp failure as well as acute type 1 resp failure.  CXR-- Personally reviewed--ETT OK/LLL opacification.  Given CTX/Z in ED.  Pt presently in ICU.  Vent PAP 28-30     :  Pt seen and examined in ICU--vented and sedated--fio2 decreased--Tm 99  WBC 15K.  Case d/w Mom  and All concerns addressed including but not limited to diagnosis, treatment plan and overall prognosis   6/3:  EXTUBATED on .  Pt admitted to abusing NO.  Intermittent sinus pauses--prolonged early this morning resulting in near syncope.  Seen by dr car.      PMH:  As above.     PSH:  As above.     FH: Non Contributory other than those listed in HPI    Social History:  Smoker     MEDICATIONS  (STANDING):  ALBUTerol    90 MICROgram(s) HFA Inhaler 2 Puff(s) Inhalation Once  azithromycin   Tablet 500 milliGRAM(s) Oral daily  cefTRIAXone Injectable. 1000 milliGRAM(s) IV Push every 24 hours  enoxaparin Injectable 40 milliGRAM(s) SubCutaneous every 12 hours  methylPREDNISolone sodium succinate Injectable 20 milliGRAM(s) IV Push every 8 hours  nicotine - 21 mG/24Hr(s) Patch 1 patch Transdermal daily  nystatin Powder 1 Application(s) Topical every 8 hours  senna 2 Tablet(s) Oral at bedtime  tiotropium 18 MICROgram(s) Capsule 1 Capsule(s) Inhalation daily    MEDICATIONS  (PRN):  ALBUTerol    90 MICROgram(s) HFA Inhaler 2 Puff(s) Inhalation every 4 hours PRN Shortness of Breath      Allergies: NKDA    ROS:  SEE BELOW        ICU Vital Signs Last 24 Hrs  T(C): 36.7 (2020 09:00), Max: 37.6 (2020 14:00)  T(F): 98 (2020 09:00), Max: 99.6 (2020 14:00)  HR: 55 (2020 09:00) (53 - 84)  BP: 117/60 (2020 09:00) (96/69 - 151/86)  BP(mean): 71 (2020 09:00) (61 - 99)  ABP: --  ABP(mean): --  RR: 20 (2020 09:00) (12 - 26)  SpO2: 91% (2020 09:00) (89% - 100%)      Mode: PS (Pressure Support)/ Spontaneous  FiO2: 45  PEEP: 10  PS: 10  MAP: 15  PIP: 20      I&O's Summary    2020 07:01  -  2020 07:00  --------------------------------------------------------  IN: 2132 mL / OUT: 2375 mL / NET: -243 mL        Physical Exam:  SEE BELOW                          12.7   15.29 )-----------( 238      ( 2020 06:25 )             39.9       06-03    141  |  107  |  22  ----------------------------<  135<H>  4.1   |  31  |  0.67    Ca    8.3<L>      2020 06:25  Phos  3.4     06-03  Mg     2.6     06-03              ABG - ( 2020 08:13 )  pH, Arterial: 7.41  pH, Blood: x     /  pCO2: 52    /  pO2: 67    / HCO3: 32    / Base Excess: 6.4   /  SaO2: 93                  Urinalysis Basic - ( 2020 14:05 )    Color: Chey / Appearance: Clear / S.025 / pH: x  Gluc: x / Ketone: Negative  / Bili: Negative / Urobili: 1 mg/dL   Blood: x / Protein: 30 mg/dL / Nitrite: Negative   Leuk Esterase: Negative / RBC: Negative /HPF / WBC 0-2   Sq Epi: x / Non Sq Epi: Few / Bacteria: x        DVT Prophylaxis:                                                            Contraindication:     Advanced Directives:    Discussed with:    Visit Information:  Time spent excluding procedure:      ** Time is exclusive of billed procedures and/or teaching and/or routine family updates.

## 2020-06-03 NOTE — CONSULT NOTE ADULT - SUBJECTIVE AND OBJECTIVE BOX
CHIEF COMPLAINT: Patient is a 39y old  Male who presents with a chief complaint of SOB (03 Jun 2020 05:58)      HPI:  40 y/o male with a PMHx of COPD and nicotine abuse presents to the ED c/o SOB, coughing and wheezing for the past few days.  He  was intubated shortly after due to lethargy and hypoxia. After intubation he became hypotensive. Called Mr. Ascencio  listed as contact and left message. (01 Jun 2020 11:18)    no acute issues this AM      PMHx: PAST MEDICAL & SURGICAL HISTORY:  Nicotine abuse  COPD (chronic obstructive pulmonary disease)  Bronchitis  History of hernia repair        Soc Hx:  nicotine dependance      Allergies: Allergies    No Known Allergies    Intolerances          REVIEW OF SYSTEMS:    CONSTITUTIONAL: No weakness, fevers or chills  EYES/ENT: No visual changes;  No vertigo or throat pain   NECK: No pain or stiffness  RESPIRATORY: No cough, wheezing, hemoptysis; No shortness of breath  CARDIOVASCULAR: No chest pain or palpitations  GASTROINTESTINAL: No abdominal or epigastric pain. No nausea, vomiting, or hematemesis; No diarrhea or constipation. No melena or hematochezia.  GENITOURINARY: No dysuria, frequency or hematuria  NEUROLOGICAL: No numbness or weakness  SKIN: No itching, burning, rashes, or lesions   All other review of systems is negative unless indicated above    Vital Signs Last 24 Hrs  T(C): 36.5 (03 Jun 2020 05:00), Max: 37.6 (02 Jun 2020 14:00)  T(F): 97.7 (03 Jun 2020 05:00), Max: 99.6 (02 Jun 2020 14:00)  HR: 55 (03 Jun 2020 08:00) (53 - 84)  BP: 126/79 (03 Jun 2020 08:00) (96/69 - 151/86)  BP(mean): 87 (03 Jun 2020 08:00) (61 - 99)  RR: 23 (03 Jun 2020 08:00) (12 - 26)  SpO2: 92% (03 Jun 2020 08:00) (89% - 100%)    I&O's Summary    02 Jun 2020 07:01  -  03 Jun 2020 07:00  --------------------------------------------------------  IN: 2132 mL / OUT: 2375 mL / NET: -243 mL            PHYSICAL EXAM:   Constitutional: NAD, awake and alert, well-developed  HEENT: PERR, EOMI, Normal Hearing, MMM  Neck: Soft and supple, No LAD, No JVD  Respiratory: Breath sounds are clear bilaterally, No wheezing, rales or rhonchi  Cardiovascular: S1 and S2, regular rate and rhythm, no Murmurs, gallops or rubs  Gastrointestinal: Bowel Sounds present, soft, nontender, nondistended, no guarding, no rebound  Extremities: No peripheral edema  Vascular: 2+ peripheral pulses  Neurological: A/O x 3, no focal deficits  Musculoskeletal: 5/5 strength b/l upper and lower extremities  Skin: No rashes    MEDICATIONS:  MEDICATIONS  (STANDING):  ALBUTerol    90 MICROgram(s) HFA Inhaler 2 Puff(s) Inhalation Once  azithromycin  IVPB 500 milliGRAM(s) IV Intermittent every 24 hours  cefTRIAXone Injectable. 1000 milliGRAM(s) IV Push every 24 hours  enoxaparin Injectable 40 milliGRAM(s) SubCutaneous every 12 hours  methylPREDNISolone sodium succinate Injectable 40 milliGRAM(s) IV Push every 8 hours  nicotine - 21 mG/24Hr(s) Patch 1 patch Transdermal daily  nystatin Powder 1 Application(s) Topical every 8 hours  senna 2 Tablet(s) Oral at bedtime  tiotropium 18 MICROgram(s) Capsule 1 Capsule(s) Inhalation daily      LABS: All Labs Reviewed:                        12.7   15.29 )-----------( 238      ( 03 Jun 2020 06:25 )             39.9     06-03    141  |  107  |  22  ----------------------------<  135<H>  4.1   |  31  |  0.67    Ca    8.3<L>      03 Jun 2020 06:25  Phos  3.4     06-03  Mg     2.6     06-03    TPro  7.3  /  Alb  3.4  /  TBili  0.4  /  DBili  x   /  AST  40<H>  /  ALT  118<H>  /  AlkPhos  105  06-01    PT/INR - ( 01 Jun 2020 09:58 )   PT: 11.6 sec;   INR: 1.04 ratio         PTT - ( 01 Jun 2020 09:58 )  PTT:26.9 sec        Blood Culture: Organism --  Gram Stain Blood -- Gram Stain --  Specimen Source .Urine Clean Catch (Midstream)  Culture-Blood --    Organism --  Gram Stain Blood -- Gram Stain --  Specimen Source .Blood Blood-Peripheral  Culture-Blood --          EKG:< from: 12 Lead ECG (06.01.20 @ 10:01) >    Diagnosis Line Sinus tachycardia with artifact.  Low voltage QRS  Nonspecific ST abnormality      Confirmed by Leon Swan ([id number]) on 6/2/2020 2:54:01 PM    < end of copied text >  < from: 12 Lead ECG (06.01.20 @ 10:01) >    >      Telemetry: SR with pauses- likley vagal in origin    ECHO: pending

## 2020-06-03 NOTE — PROGRESS NOTE ADULT - ASSESSMENT
IMP:    40 y/o male pt of size possible substance abuse (NO abuse) and COPD admitted with acute on chronic type 2 resp failure and acute type 1 resp failure and LLL CAP sepsis and Leukocytosis.  + Benzo UTox--pt has Rx for Benzo for sleep  Symptomatic sinus pauses    Plan:    Cont with CTX/Z (3)  Decrease Steroids--cont BD   PO diet  OOB  Cards eval  Check TTE and TSH  HOB > 30  DVT prophy--SCD and BID LMWH    Tx to tele-- d/w ICU staff on multi disciplinary rounds--case d/w dr car at bedside.  All concerns addressed including but not limited to diagnosis, treatment plan and overall prognosis   Pt signed out and care tx to hospitalist service at 0930--d/w dr france via phone

## 2020-06-03 NOTE — PROGRESS NOTE ADULT - RS GEN PE MLT RESP DETAILS PC
airway patent/breath sounds equal
breath sounds equal/good air movement
breath sounds equal/good air movement/No wheezing

## 2020-06-04 DIAGNOSIS — R00.1 BRADYCARDIA, UNSPECIFIED: ICD-10-CM

## 2020-06-04 DIAGNOSIS — R55 SYNCOPE AND COLLAPSE: ICD-10-CM

## 2020-06-04 PROBLEM — Z72.0 TOBACCO USE: Chronic | Status: ACTIVE | Noted: 2020-06-01

## 2020-06-04 PROBLEM — J44.9 CHRONIC OBSTRUCTIVE PULMONARY DISEASE, UNSPECIFIED: Chronic | Status: ACTIVE | Noted: 2020-06-01

## 2020-06-04 LAB
HCT VFR BLD CALC: 40.7 % — SIGNIFICANT CHANGE UP (ref 39–50)
HGB BLD-MCNC: 12.8 G/DL — LOW (ref 13–17)
MAGNESIUM SERPL-MCNC: 2.5 MG/DL — SIGNIFICANT CHANGE UP (ref 1.6–2.6)
MCHC RBC-ENTMCNC: 31.4 GM/DL — LOW (ref 32–36)
MCHC RBC-ENTMCNC: 32.3 PG — SIGNIFICANT CHANGE UP (ref 27–34)
MCV RBC AUTO: 102.8 FL — HIGH (ref 80–100)
PHOSPHATE SERPL-MCNC: 4.4 MG/DL — SIGNIFICANT CHANGE UP (ref 2.5–4.5)
PLATELET # BLD AUTO: 262 K/UL — SIGNIFICANT CHANGE UP (ref 150–400)
RBC # BLD: 3.96 M/UL — LOW (ref 4.2–5.8)
RBC # FLD: 16 % — HIGH (ref 10.3–14.5)
TSH SERPL-MCNC: 3.74 UU/ML — SIGNIFICANT CHANGE UP (ref 0.34–4.82)
WBC # BLD: 14.51 K/UL — HIGH (ref 3.8–10.5)
WBC # FLD AUTO: 14.51 K/UL — HIGH (ref 3.8–10.5)

## 2020-06-04 PROCEDURE — 99233 SBSQ HOSP IP/OBS HIGH 50: CPT

## 2020-06-04 PROCEDURE — 99222 1ST HOSP IP/OBS MODERATE 55: CPT

## 2020-06-04 RX ORDER — FOLIC ACID 0.8 MG
1 TABLET ORAL DAILY
Refills: 0 | Status: DISCONTINUED | OUTPATIENT
Start: 2020-06-04 | End: 2020-06-04

## 2020-06-04 RX ORDER — ALPRAZOLAM 0.25 MG
0.5 TABLET ORAL THREE TIMES A DAY
Refills: 0 | Status: DISCONTINUED | OUTPATIENT
Start: 2020-06-04 | End: 2020-06-06

## 2020-06-04 RX ORDER — DEXTROAMPHETAMINE SACCHARATE, AMPHETAMINE ASPARTATE, DEXTROAMPHETAMINE SULFATE AND AMPHETAMINE SULFATE 1.875; 1.875; 1.875; 1.875 MG/1; MG/1; MG/1; MG/1
20 TABLET ORAL
Refills: 0 | Status: DISCONTINUED | OUTPATIENT
Start: 2020-06-04 | End: 2020-06-06

## 2020-06-04 RX ORDER — THIAMINE MONONITRATE (VIT B1) 100 MG
100 TABLET ORAL DAILY
Refills: 0 | Status: DISCONTINUED | OUTPATIENT
Start: 2020-06-04 | End: 2020-06-04

## 2020-06-04 RX ORDER — ALBUTEROL 90 UG/1
2 AEROSOL, METERED ORAL EVERY 4 HOURS
Refills: 0 | Status: DISCONTINUED | OUTPATIENT
Start: 2020-06-04 | End: 2020-06-06

## 2020-06-04 RX ORDER — TIOTROPIUM BROMIDE 18 UG/1
1 CAPSULE ORAL; RESPIRATORY (INHALATION) DAILY
Refills: 0 | Status: DISCONTINUED | OUTPATIENT
Start: 2020-06-04 | End: 2020-06-06

## 2020-06-04 RX ADMIN — AZITHROMYCIN 500 MILLIGRAM(S): 500 TABLET, FILM COATED ORAL at 10:49

## 2020-06-04 RX ADMIN — Medication 20 MILLIGRAM(S): at 04:24

## 2020-06-04 RX ADMIN — TIOTROPIUM BROMIDE 1 CAPSULE(S): 18 CAPSULE ORAL; RESPIRATORY (INHALATION) at 08:39

## 2020-06-04 RX ADMIN — ENOXAPARIN SODIUM 40 MILLIGRAM(S): 100 INJECTION SUBCUTANEOUS at 17:29

## 2020-06-04 RX ADMIN — CEFTRIAXONE 1000 MILLIGRAM(S): 500 INJECTION, POWDER, FOR SOLUTION INTRAMUSCULAR; INTRAVENOUS at 12:45

## 2020-06-04 RX ADMIN — Medication 1 PATCH: at 17:32

## 2020-06-04 RX ADMIN — Medication 20 MILLIGRAM(S): at 13:22

## 2020-06-04 RX ADMIN — Medication 2 MILLIGRAM(S): at 04:25

## 2020-06-04 RX ADMIN — Medication 0.5 MILLIGRAM(S): at 21:18

## 2020-06-04 RX ADMIN — NYSTATIN CREAM 1 APPLICATION(S): 100000 CREAM TOPICAL at 20:46

## 2020-06-04 RX ADMIN — Medication 1 TABLET(S): at 10:50

## 2020-06-04 RX ADMIN — Medication 2 MILLIGRAM(S): at 10:46

## 2020-06-04 RX ADMIN — Medication 100 MILLIGRAM(S): at 10:50

## 2020-06-04 RX ADMIN — Medication 1 PATCH: at 10:50

## 2020-06-04 RX ADMIN — Medication 1 MILLIGRAM(S): at 10:49

## 2020-06-04 RX ADMIN — Medication 0.25 MILLIGRAM(S): at 00:24

## 2020-06-04 RX ADMIN — ENOXAPARIN SODIUM 40 MILLIGRAM(S): 100 INJECTION SUBCUTANEOUS at 04:24

## 2020-06-04 RX ADMIN — DEXTROAMPHETAMINE SACCHARATE, AMPHETAMINE ASPARTATE, DEXTROAMPHETAMINE SULFATE AND AMPHETAMINE SULFATE 20 MILLIGRAM(S): 1.875; 1.875; 1.875; 1.875 TABLET ORAL at 14:00

## 2020-06-04 NOTE — PROGRESS NOTE ADULT - SUBJECTIVE AND OBJECTIVE BOX
CHIEF COMPLAINT: Patient is a 39y old  Male who presents with a chief complaint of SOB (03 Jun 2020 05:58)      HPI:  38 y/o male with a PMHx of COPD and nicotine abuse presents to the ED c/o SOB, coughing and wheezing for the past few days.  He  was intubated shortly after due to lethargy and hypoxia. After intubation he became hypotensive.     questionable alcohol withrdawal-- on CIWA protocol  no acute issues this AM      PMHx: PAST MEDICAL & SURGICAL HISTORY:  Nicotine abuse  COPD (chronic obstructive pulmonary disease)  Bronchitis  History of hernia repair        Soc Hx:  nicotine dependance      Allergies: Allergies    No Known Allergies    Intolerances          REVIEW OF SYSTEMS:    CONSTITUTIONAL: No weakness, fevers or chills  EYES/ENT: No visual changes;  No vertigo or throat pain   NECK: No pain or stiffness  RESPIRATORY: No cough, wheezing, hemoptysis; No shortness of breath  CARDIOVASCULAR: No chest pain or palpitations  GASTROINTESTINAL: No abdominal or epigastric pain. No nausea, vomiting, or hematemesis; No diarrhea or constipation. No melena or hematochezia.  GENITOURINARY: No dysuria, frequency or hematuria  NEUROLOGICAL: No numbness or weakness  SKIN: No itching, burning, rashes, or lesions   All other review of systems is negative unless indicated above    ICU Vital Signs Last 24 Hrs  T(C): 37.1 (04 Jun 2020 04:15), Max: 37.1 (04 Jun 2020 04:15)  T(F): 98.7 (04 Jun 2020 04:15), Max: 98.7 (04 Jun 2020 04:15)  HR: 62 (04 Jun 2020 04:15) (53 - 74)  BP: 101/74 (04 Jun 2020 04:15) (101/74 - 136/61)  BP(mean): 82 (03 Jun 2020 17:00) (71 - 94)  ABP: --  ABP(mean): --  RR: 18 (04 Jun 2020 04:15) (15 - 24)  SpO2: 94% (04 Jun 2020 04:15) (87% - 96%)      02 Jun 2020 07:01  -  03 Jun 2020 07:00  --------------------------------------------------------  IN: 2132 mL / OUT: 2375 mL / NET: -243 mL            PHYSICAL EXAM:   Constitutional: NAD,arouable  HEENT: PERR, EOMI, Normal Hearing, MMM  Neck: Soft and supple, No LAD, No JVD  Respiratory: rare scattered crackles   Cardiovascular: S1 and S2, regular rate and rhythm, no Murmurs, gallops or rubs  Gastrointestinal: Bowel Sounds present, soft, nontender, nondistended, no guarding, no rebound  Extremities: No peripheral edema  Vascular: 2+ peripheral pulses    MEDICATIONS  (STANDING):  ALBUTerol    90 MICROgram(s) HFA Inhaler 2 Puff(s) Inhalation Once  azithromycin   Tablet 500 milliGRAM(s) Oral daily  cefTRIAXone Injectable. 1000 milliGRAM(s) IV Push every 24 hours  enoxaparin Injectable 40 milliGRAM(s) SubCutaneous every 12 hours  methylPREDNISolone sodium succinate Injectable 20 milliGRAM(s) IV Push every 8 hours  nicotine - 21 mG/24Hr(s) Patch 1 patch Transdermal daily  nystatin Powder 1 Application(s) Topical every 8 hours  senna 2 Tablet(s) Oral at bedtime  tiotropium 18 MICROgram(s) Capsule 1 Capsule(s) Inhalation daily    MEDICATIONS  (PRN):  ALBUTerol    90 MICROgram(s) HFA Inhaler 2 Puff(s) Inhalation every 4 hours PRN Shortness of Breath  LORazepam   Injectable 2 milliGRAM(s) IV Push every 2 hours PRN CIWA-Ar score increase by 2 points and a total score of 7 or less  LORazepam   Injectable 2 milliGRAM(s) IV Push every 1 hour PRN CIWA-Ar score 8 or greater      LABS: All Labs Reviewed:                        12.7   15.29 )-----------( 238      ( 03 Jun 2020 06:25 )             39.9     06-03    141  |  107  |  22  ----------------------------<  135<H>  4.1   |  31  |  0.67    Ca    8.3<L>      03 Jun 2020 06:25  Phos  3.4     06-03  Mg     2.6     06-03    TPro  7.3  /  Alb  3.4  /  TBili  0.4  /  DBili  x   /  AST  40<H>  /  ALT  118<H>  /  AlkPhos  105  06-01    PT/INR - ( 01 Jun 2020 09:58 )   PT: 11.6 sec;   INR: 1.04 ratio         PTT - ( 01 Jun 2020 09:58 )  PTT:26.9 sec        Blood Culture: Organism --  Gram Stain Blood -- Gram Stain --  Specimen Source .Urine Clean Catch (Midstream)  Culture-Blood --    Organism --  Gram Stain Blood -- Gram Stain --  Specimen Source .Blood Blood-Peripheral  Culture-Blood --          EKG:< from: 12 Lead ECG (06.01.20 @ 10:01) >    Diagnosis Line Sinus tachycardia with artifact.  Low voltage QRS  Nonspecific ST abnormality      Confirmed by Leon Swan ([id number]) on 6/2/2020 2:54:01 PM    < end of copied text >  < from: 12 Lead ECG (06.01.20 @ 10:01) >    >      Telemetry: SR with pauses- likely vagal in origin    ECHO:   < from: TTE Echo Complete w/o contrast w/ Doppler (06.03.20 @ 10:21) >   Summary     Mild (1+) mitral regurgitation is present.   Mild mitral annular calcification is present.   Normal aortic valve structure and function.   The left ventricle is normal in size, wallthickness, wall motion and   contractility.   Normal appearing right ventricle structure and function.   No evidence of pericardial effusion.     Signature     ----------------------------------------------------------------   Electronically signed by Gautam Saxena MD(Interpreting   physician) on 06/03/2020 05:07 PM   ----------------------------------------------------------------    < end of copied text >

## 2020-06-04 NOTE — CONSULT NOTE ADULT - PROBLEM SELECTOR RECOMMENDATION 9
-situation c/w vasovagal syncope  -Also pt was on propofol & xanax night before.  -continue tele for any symptomatic arrhythmia during awake.

## 2020-06-04 NOTE — CONSULT NOTE ADULT - PROBLEM SELECTOR RECOMMENDATION 2
- multiple nocturnal bradycardia with pauses/junctional escape beat. Strip reviewed with Dr. Alvarez likely increased vagal tone d/t sleep apnea.  - recommend sleep test  -pt refusing ILR however agrees with [facility], which we will order then deliver to pt's home after d/c.  -f/u in EP clinic after JUDY completed    Plan d/w pt/Dr. Alvarez/hospitalist.

## 2020-06-04 NOTE — PROGRESS NOTE ADULT - ASSESSMENT
Assessment and Plan:  39 year old M w/ COPD admitted to  for treatment of COPD exacerbation insetting of CAP, now requiring mechanical ventilation. pt was extubated on 6/2.   Symptomatic sinus pauses    1. Acute hypoxic respiratory failure with acute on chronic hypercapnic respiratory failure.   2. COPD exacerbation 2/2 Community Acquired PNA  3. Substance abuse disorder (Nitrous oxide)  4. Episodic Bradycardia    -seen by cardiology and EP. plan for [facility] after discharge. bradycardia likely due to increased vagal tone related to sleep apnea  -Extubated 6/2/20. Continue tiotropium and Albuterol PRN. stop solumedrol and switch to oral prednisone, taper as edenilson  -d/c PPI. TLC/DASH diet.  - Continue Ceftriaxone and Azithromycin for treatment of CAP (started 6/1)  -Monitor for dizziness or AMS - resume home meds. no ETOH withdrawal. last had ETOH a year ago    Advance directives/GOC/Code status: full code  Dispo: inpatient  ELOS: possible d/c home tomorrow

## 2020-06-04 NOTE — PROGRESS NOTE ADULT - SUBJECTIVE AND OBJECTIVE BOX
CC/reason for follow up: resp failure    S: denies any complaints. wants to go home. denies any etoh use. admits to NO use.     ROS: no chest pain, no dyspnea    T(C): 36.6 (06-04-20 @ 09:05), Max: 37.1 (06-04-20 @ 04:15)  HR: 77 (06-04-20 @ 09:05) (59 - 77)  BP: 114/76 (06-04-20 @ 09:05) (101/74 - 125/77)  RR: 18 (06-04-20 @ 09:05) (18 - 24)  SpO2: 92% (06-04-20 @ 09:05) (87% - 96%)    Gen - Pleasant, cooperative, in no acute distress  HEENT- PERRL, moist mucus membranes, OP clear  CV - RRR, No m/r/g, +pulses, no edema  Lungs - Good effort, Coarse BS bilaterally  Abdomen - Soft, nontender, nondistended, +BS, No rebound/rigidity/guarding  Ext - No cyanosis/clubbing.   Skin - No rashes, No jaundice  Psych- Alert & oriented x 3  Neuro- fluent speech, no facial droop, EOMI. moves all ext    MEDICATIONS  (STANDING):  ALBUTerol    90 MICROgram(s) HFA Inhaler 2 Puff(s) Inhalation Once  amphetamine/dextroamphetamine 20 milliGRAM(s) Oral <User Schedule>  azithromycin   Tablet 500 milliGRAM(s) Oral daily  cefTRIAXone Injectable. 1000 milliGRAM(s) IV Push every 24 hours  enoxaparin Injectable 40 milliGRAM(s) SubCutaneous every 12 hours  methylPREDNISolone sodium succinate Injectable 20 milliGRAM(s) IV Push every 8 hours  nicotine - 21 mG/24Hr(s) Patch 1 patch Transdermal daily  nystatin Powder 1 Application(s) Topical every 8 hours  senna 2 Tablet(s) Oral at bedtime  tiotropium 18 MICROgram(s) Capsule 1 Capsule(s) Inhalation daily    MEDICATIONS  (PRN):  ALBUTerol    90 MICROgram(s) HFA Inhaler 2 Puff(s) Inhalation every 4 hours PRN Shortness of Breath  ALPRAZolam 0.5 milliGRAM(s) Oral three times a day PRN Anxiety/Agitation      Diagnostic studies: personally reviewed  LABS: All Labs Reviewed:                        12.8   14.51 )-----------( 262      ( 04 Jun 2020 07:14 )             40.7     06-04    141  |  109<H>  |  22  ----------------------------<  189<H>  4.3   |  27  |  0.75    Ca    8.4<L>      04 Jun 2020 07:14  Phos  4.4     06-04  Mg     2.5     06-04    TPro  6.9  /  Alb  3.1<L>  /  TBili  0.4  /  DBili  <0.1  /  AST  28  /  ALT  119<H>  /  AlkPhos  91  06-04            Blood Culture: 06-01 @ 14:05  Organism --  Gram Stain Blood -- Gram Stain --  Specimen Source .Urine Clean Catch (Midstream)  Culture-Blood --    06-01 @ 09:58  Organism --  Gram Stain Blood -- Gram Stain --  Specimen Source .Blood Blood-Peripheral  Culture-Blood --      RADIOLOGY/EKG:    < from: Xray Chest 1 View- PORTABLE-Urgent (06.01.20 @ 12:33) >  Impression:subsegmental atelectasis in the LEFT lower lobe although a patch obscures the majority of the LEFT lower lobe. T and NG tubes in satisfactory position.    < end of copied text >

## 2020-06-04 NOTE — PROGRESS NOTE ADULT - ASSESSMENT
39 M with COPD, likely sleep apnea and bradycardia and pauses on monitor       echo  reveals preserved  EF and no significant valve disease      given bradycardia, would avoid AV roscoe blockers- will consult EP for possibility of loop recorder to follow regarding possible syncope and pauses      it is likely that patient has elevated vagal tone due to presumed sleep apnea- recommend sleep study, adequate hydration

## 2020-06-05 LAB
CRP SERPL-MCNC: 1.6 MG/DL — HIGH (ref 0–0.4)
D DIMER BLD IA.RAPID-MCNC: 304 NG/ML DDU — HIGH
FERRITIN SERPL-MCNC: 134 NG/ML — SIGNIFICANT CHANGE UP (ref 30–400)
LDH SERPL L TO P-CCNC: 236 U/L — SIGNIFICANT CHANGE UP (ref 84–241)
NT-PROBNP SERPL-SCNC: 96 PG/ML — SIGNIFICANT CHANGE UP (ref 0–125)
PROCALCITONIN SERPL-MCNC: 0.06 NG/ML — SIGNIFICANT CHANGE UP (ref 0.02–0.1)
SARS-COV-2 RNA SPEC QL NAA+PROBE: SIGNIFICANT CHANGE UP

## 2020-06-05 PROCEDURE — 99233 SBSQ HOSP IP/OBS HIGH 50: CPT

## 2020-06-05 PROCEDURE — 71275 CT ANGIOGRAPHY CHEST: CPT | Mod: 26

## 2020-06-05 RX ORDER — FUROSEMIDE 40 MG
40 TABLET ORAL ONCE
Refills: 0 | Status: COMPLETED | OUTPATIENT
Start: 2020-06-05 | End: 2020-06-05

## 2020-06-05 RX ORDER — ALBUTEROL 90 UG/1
2 AEROSOL, METERED ORAL EVERY 6 HOURS
Refills: 0 | Status: DISCONTINUED | OUTPATIENT
Start: 2020-06-05 | End: 2020-06-06

## 2020-06-05 RX ORDER — ALPRAZOLAM 0.25 MG
1 TABLET ORAL
Qty: 0 | Refills: 0 | DISCHARGE
Start: 2020-06-05

## 2020-06-05 RX ORDER — ALBUTEROL 90 UG/1
2 AEROSOL, METERED ORAL
Qty: 0 | Refills: 0 | DISCHARGE

## 2020-06-05 RX ORDER — FUROSEMIDE 40 MG
20 TABLET ORAL ONCE
Refills: 0 | Status: DISCONTINUED | OUTPATIENT
Start: 2020-06-05 | End: 2020-06-05

## 2020-06-05 RX ADMIN — TIOTROPIUM BROMIDE 1 CAPSULE(S): 18 CAPSULE ORAL; RESPIRATORY (INHALATION) at 08:18

## 2020-06-05 RX ADMIN — Medication 1 PATCH: at 10:03

## 2020-06-05 RX ADMIN — DEXTROAMPHETAMINE SACCHARATE, AMPHETAMINE ASPARTATE, DEXTROAMPHETAMINE SULFATE AND AMPHETAMINE SULFATE 20 MILLIGRAM(S): 1.875; 1.875; 1.875; 1.875 TABLET ORAL at 16:12

## 2020-06-05 RX ADMIN — ENOXAPARIN SODIUM 40 MILLIGRAM(S): 100 INJECTION SUBCUTANEOUS at 06:41

## 2020-06-05 RX ADMIN — Medication 40 MILLIGRAM(S): at 16:12

## 2020-06-05 RX ADMIN — DEXTROAMPHETAMINE SACCHARATE, AMPHETAMINE ASPARTATE, DEXTROAMPHETAMINE SULFATE AND AMPHETAMINE SULFATE 20 MILLIGRAM(S): 1.875; 1.875; 1.875; 1.875 TABLET ORAL at 10:03

## 2020-06-05 RX ADMIN — ALBUTEROL 2 PUFF(S): 90 AEROSOL, METERED ORAL at 20:45

## 2020-06-05 RX ADMIN — Medication 0.5 MILLIGRAM(S): at 03:47

## 2020-06-05 RX ADMIN — CEFTRIAXONE 1000 MILLIGRAM(S): 500 INJECTION, POWDER, FOR SOLUTION INTRAMUSCULAR; INTRAVENOUS at 16:14

## 2020-06-05 RX ADMIN — ENOXAPARIN SODIUM 40 MILLIGRAM(S): 100 INJECTION SUBCUTANEOUS at 16:12

## 2020-06-05 RX ADMIN — AZITHROMYCIN 500 MILLIGRAM(S): 500 TABLET, FILM COATED ORAL at 10:03

## 2020-06-05 RX ADMIN — Medication 40 MILLIGRAM(S): at 06:41

## 2020-06-05 RX ADMIN — Medication 0.5 MILLIGRAM(S): at 23:09

## 2020-06-05 NOTE — PROGRESS NOTE ADULT - SUBJECTIVE AND OBJECTIVE BOX
CHIEF COMPLAINT: Patient is a 39y old  Male who presents with a chief complaint of SOB (03 Jun 2020 05:58)      HPI:  40 y/o male with a PMHx of COPD and nicotine abuse presents to the ED c/o SOB, coughing and wheezing for the past few days.  He  was intubated shortly after due to lethargy and hypoxia. After intubation he became hypotensive.     \  no acute issues this AM      PMHx: PAST MEDICAL & SURGICAL HISTORY:  Nicotine abuse  COPD (chronic obstructive pulmonary disease)  Bronchitis  History of hernia repair        Soc Hx:  nicotine dependance      Allergies: Allergies    No Known Allergies    Intolerances          REVIEW OF SYSTEMS:    CONSTITUTIONAL: No weakness, fevers or chills  EYES/ENT: No visual changes;  No vertigo or throat pain   NECK: No pain or stiffness  RESPIRATORY: No cough, wheezing, hemoptysis; No shortness of breath  CARDIOVASCULAR: No chest pain or palpitations  GASTROINTESTINAL: No abdominal or epigastric pain. No nausea, vomiting, or hematemesis; No diarrhea or constipation. No melena or hematochezia.  GENITOURINARY: No dysuria, frequency or hematuria  NEUROLOGICAL: No numbness or weakness  SKIN: No itching, burning, rashes, or lesions   All other review of systems is negative unless indicated above    ICU Vital Signs Last 24 Hrs  T(C): 36.8 (04 Jun 2020 20:47), Max: 36.8 (04 Jun 2020 20:47)  T(F): 98.2 (04 Jun 2020 20:47), Max: 98.2 (04 Jun 2020 20:47)  HR: 91 (04 Jun 2020 20:47) (77 - 91)  BP: 137/77 (04 Jun 2020 20:47) (114/76 - 137/77)  BP(mean): --  ABP: --  ABP(mean): --  RR: 18 (04 Jun 2020 09:05) (18 - 18)  SpO2: 92% (04 Jun 2020 20:47) (92% - 92%)          PHYSICAL EXAM:   Constitutional: NAD,arouable  HEENT: PERR, EOMI, Normal Hearing, MMM  Neck: Soft and supple, No LAD, No JVD  Respiratory: rare scattered crackles   Cardiovascular: S1 and S2, regular rate and rhythm, no Murmurs, gallops or rubs  Gastrointestinal: Bowel Sounds present, soft, nontender, nondistended, no guarding, no rebound  Extremities: No peripheral edema  Vascular: 2+ peripheral pulses      MEDICATIONS  (STANDING):  ALBUTerol    90 MICROgram(s) HFA Inhaler 2 Puff(s) Inhalation Once  amphetamine/dextroamphetamine 20 milliGRAM(s) Oral <User Schedule>  azithromycin   Tablet 500 milliGRAM(s) Oral daily  cefTRIAXone Injectable. 1000 milliGRAM(s) IV Push every 24 hours  enoxaparin Injectable 40 milliGRAM(s) SubCutaneous every 12 hours  nicotine - 21 mG/24Hr(s) Patch 1 patch Transdermal daily  nystatin Powder 1 Application(s) Topical every 8 hours  predniSONE   Tablet 40 milliGRAM(s) Oral daily  senna 2 Tablet(s) Oral at bedtime  tiotropium 18 MICROgram(s) Capsule 1 Capsule(s) Inhalation daily    MEDICATIONS  (PRN):  ALBUTerol    90 MICROgram(s) HFA Inhaler 2 Puff(s) Inhalation every 4 hours PRN Shortness of Breath  ALPRAZolam 0.5 milliGRAM(s) Oral three times a day PRN Anxiety/Agitation    LABS: All Labs Reviewed:                                       12.8   14.51 )-----------( 262      ( 04 Jun 2020 07:14 )             40.7   06-04    141  |  109<H>  |  22  ----------------------------<  189<H>  4.3   |  27  |  0.75    Ca    8.4<L>      04 Jun 2020 07:14  Phos  4.4     06-04  Mg     2.5     06-04    TPro  6.9  /  Alb  3.1<L>  /  TBili  0.4  /  DBili  <0.1  /  AST  28  /  ALT  119<H>  /  AlkPhos  91  06-04        EKG:< from: 12 Lead ECG (06.01.20 @ 10:01) >    Diagnosis Line Sinus tachycardia with artifact.  Low voltage QRS  Nonspecific ST abnormality      Confirmed by Leon Swan ([id number]) on 6/2/2020 2:54:01 PM    < end of copied text >  < from: 12 Lead ECG (06.01.20 @ 10:01) >    >      Telemetry: SR with pauses- likely vagal in origin    ECHO:   < from: TTE Echo Complete w/o contrast w/ Doppler (06.03.20 @ 10:21) >   Summary     Mild (1+) mitral regurgitation is present.   Mild mitral annular calcification is present.   Normal aortic valve structure and function.   The left ventricle is normal in size, wallthickness, wall motion and   contractility.   Normal appearing right ventricle structure and function.   No evidence of pericardial effusion.     Signature     ----------------------------------------------------------------   Electronically signed by Gautam Saxena MD(Interpreting   physician) on 06/03/2020 05:07 PM   ----------------------------------------------------------------    < end of copied text >

## 2020-06-05 NOTE — CONSULT NOTE ADULT - ASSESSMENT
38 y/o male with a PMHx of COPD and nicotine abuse, Substance abuse disorder (Nitrous oxide), presents to the ED c/o SOB, coughing and wheezing for the past few days.  He  was intubated shortly after due to lethargy and hypoxia on 6/1/2020. Pt is being treated for COPD exacerbation 2/2 Community Acquired PNA. COVID-negative. Pt was extubated on 6/3/2020, noted to have nocturnal bradycardia with snoring in ICU.  On 6/3 after pt returned from bathroom, felt dizzy & passed out for ~ 10seconds then regained consciousness spontaneously, tele 'bradycardic 30-40's bpm' no strip available in the chart. (Pt was on propofol till 6/2 & took xanax night before)    During tele review: SB 40-70's bpm, pauses w/junctional escape beat 30-40's bpm during sleep.  Pt is alert & OOB today, states 'snore alot' denies dizzy during ADL in the past, however 'passed out 2 years ago at work in hot summer day'.  states' feeling better today' denies cough, chill, fever.
39 M with COPD, likely sleep apnea and bradycardia and pauses on monitor       will check echo to evaluate LV function      given bradycardia, would avoid AV roscoe blockers     it is likely that patient has elevated vagal tone due to presumed sleep apnea- recommend sleep study, adequate hydration     would consider long term rhythm monitoring ( event recorder) can be done as an outpatient     if echo with normal EF- DC planning
PROBLEMS:    Acute on chronic hypoxaemic/hypercapnic respiratory failure multifactorial due to COPD exacerbation due to Community Acquired PNA / Substance abuse disorder (Nitrous oxide)-Intubated 6/1 s/p Extubated Community Acquired Pneumonia-Bilateral mild patchy groundglass opacities-non specific-airway disease  Acute excerbation of COPD  Episodic Bradycardia  Substance abuse disorder-Nitrous oxide/Nicotine use    PLAN:    IV rhocephin  po prednisone  aerosols  pulse ox on ambulation  dvt prophylasix

## 2020-06-05 NOTE — PROGRESS NOTE ADULT - SUBJECTIVE AND OBJECTIVE BOX
CHIEF COMPLAINT/DIAGNOSIS: Respiratory Failure / CAP / COPD / Substance Abuse    HPI: 40 y/o male with a PMHx of COPD and nicotine abuse presents to the ED c/o SOB, coughing and wheezing. Patient intubated shortly after admission due to lethargy and hypoxia. Treated for acute hypoxic respiratory failure with acute on chronic hypercapnic respiratory failure due to COPD exacerbation 2/2 Community Acquired PNA and Nitrous oxide substance abuse. Extubated on 6/2. Course complicated by bradycardia and persistent hypoxia.     SUBJECTIVE:  6/5 - feeling better, hypoxia on ambulation. Pulm cx. d/w pulm obtain CTA r/o PE.    REVIEW OF SYSTEMS:  All other review of systems is negative unless indicated above    PHYSICAL EXAM:  Gen - Pleasant, cooperative, in no acute distress, obese  HEENT- PERRL, moist mucus membranes, OP clear  CV - RRR, No m/r/g, +pulses, no edema  Lungs - Good effort, Coarse BS bilaterally  Abdomen - Soft, nontender, nondistended, +BS, No rebound/rigidity/guarding  Ext - No cyanosis/clubbing.   Skin - No rashes, No jaundice  Psych- Alert & oriented x 3  Neuro- fluent speech, no facial droop, EOMI. moves all ext    Vital Signs Last 24 Hrs  T(C): 36.8 (05 Jun 2020 09:58), Max: 36.8 (04 Jun 2020 20:47)  T(F): 98.2 (05 Jun 2020 09:58), Max: 98.2 (04 Jun 2020 20:47)  HR: 72 (05 Jun 2020 09:58) (71 - 91)  BP: 105/79 (05 Jun 2020 09:58) (105/79 - 137/77)  BP(mean): --  RR: 18 (05 Jun 2020 09:58) (18 - 18)  SpO2: 90% (05 Jun 2020 09:58) (90% - 92%)    LABS: All Labs Reviewed:                        12.8   14.51 )-----------( 262      ( 04 Jun 2020 07:14 )             40.7     06-04    141  |  109<H>  |  22  ----------------------------<  189<H>  4.3   |  27  |  0.75    Ca    8.4<L>      04 Jun 2020 07:14  Phos  4.4     06-04  Mg     2.5     06-04    TPro  6.9  /  Alb  3.1<L>  /  TBili  0.4  /  DBili  <0.1  /  AST  28  /  ALT  119<H>  /  AlkPhos  91  06-04    RADIOLOGY:  < from: Xray Chest 1 View- PORTABLE-Urgent (06.01.20 @ 12:33) >  Impression: Subsegmental atelectasis in the LEFT lower lobe although a patch obscures the majority of the LEFT lower lobe. T and NG tubes in satisfactory position.  < end of copied text >    ECHOCARDIOGRAM:  < from: TTE Echo Complete w/o contrast w/ Doppler (06.03.20 @ 10:21) >   Impression     Summary     Mild (1+) mitral regurgitation is present.   Mild mitral annular calcification is present.   Normal aortic valve structure and function.   The left ventricle is normal in size, wallthickness, wall motion and   contractility.   Normal appearing right ventricle structure and function.   No evidence of pericardial effusion.    < end of copied text >      MEDICATIONS  (STANDING):  amphetamine/dextroamphetamine 20 milliGRAM(s) Oral <User Schedule>  cefTRIAXone Injectable. 1000 milliGRAM(s) IV Push every 24 hours  enoxaparin Injectable 40 milliGRAM(s) SubCutaneous every 12 hours  nicotine - 21 mG/24Hr(s) Patch 1 patch Transdermal daily  nystatin Powder 1 Application(s) Topical every 8 hours  predniSONE   Tablet 40 milliGRAM(s) Oral daily  senna 2 Tablet(s) Oral at bedtime  tiotropium 18 MICROgram(s) Capsule 1 Capsule(s) Inhalation daily    MEDICATIONS  (PRN):  ALBUTerol    90 MICROgram(s) HFA Inhaler 2 Puff(s) Inhalation every 4 hours PRN Shortness of Breath  ALPRAZolam 0.5 milliGRAM(s) Oral three times a day PRN Anxiety/Agitation    TELEMETRY REVIEW:  6/5 - SR 60-70s, pauses with junctional escape beats during nocturnal hours    ASSESSMENT AND PLAN:    1. Acute hypoxic respiratory failure with acute on chronic hypercapnic respiratory failure multifactorial due to COPD exacerbation 2/2 Community Acquired PNA / Substance abuse disorder (Nitrous oxide)  - tele monitoring. tele review as above  - Intubated 6/1. Extubated 6/2  - Cont. tiotropium and Albuterol ATC q6 / PRN  - Continue Ceftriaxone, Azithromycin completed  - S/p IV solumedrol switched to oral prednisone on 6/5  - Pulm -- outpatient sleep study recc.  6/5 - patient 02 amb. 88%. pulm consulted. d/w pulm obtain CTA r/o PE, change albuterol to ATC Q6 / PRN    2. Episodic Bradycardia  - tele monitoring. tele review as above.  - refusing LINQ, MCOT on D/C -- outpatient follow up with EP on 7/27/20.   - Cardiology and EP f/u appreciated    3. Substance abuse disorder   *Nitrous oxide, Nicotine  - no ETOH withdrawal. last had ETOH a year ago  - resume home meds - Adderall / xanax   - smoking cessation advised. Cont. nicotine patch     4. DVT PPX  - Sq Lovenox     Full code    Dispo: inpatient. enhanced supervision. CHIEF COMPLAINT/DIAGNOSIS: Respiratory Failure / CAP / COPD / Substance Abuse    HPI: 40 y/o male with a PMHx of COPD and nicotine abuse presents to the ED c/o SOB, coughing and wheezing. Patient intubated shortly after admission due to lethargy and hypoxia. Treated for acute hypoxic respiratory failure with acute on chronic hypercapnic respiratory failure due to COPD exacerbation 2/2 Community Acquired PNA and Nitrous oxide substance abuse. Extubated on 6/2. Course complicated by bradycardia and persistent hypoxia.     SUBJECTIVE:  6/5 - feeling better, hypoxia on ambulation. Pulm cx. d/w pulm obtain CTA r/o PE.    REVIEW OF SYSTEMS:  All other review of systems is negative unless indicated above    PHYSICAL EXAM:  Gen - Pleasant, cooperative, in no acute distress, obese  HEENT- PERRL, moist mucus membranes, OP clear  CV - RRR, No m/r/g, +pulses, no edema  Lungs - Good effort, CTA bilaterally  Abdomen - Soft, nontender, nondistended, +BS, No rebound/rigidity/guarding  Ext - No cyanosis/clubbing.   Skin - No rashes, No jaundice  Psych- Alert & oriented x 3  Neuro- fluent speech, no facial droop, EOMI. moves all ext    Vital Signs Last 24 Hrs  T(C): 36.8 (05 Jun 2020 09:58), Max: 36.8 (04 Jun 2020 20:47)  T(F): 98.2 (05 Jun 2020 09:58), Max: 98.2 (04 Jun 2020 20:47)  HR: 72 (05 Jun 2020 09:58) (71 - 91)  BP: 105/79 (05 Jun 2020 09:58) (105/79 - 137/77)  BP(mean): --  RR: 18 (05 Jun 2020 09:58) (18 - 18)  SpO2: 90% (05 Jun 2020 09:58) (90% - 92%)    LABS: All Labs Reviewed:                        12.8   14.51 )-----------( 262      ( 04 Jun 2020 07:14 )             40.7     06-04    141  |  109<H>  |  22  ----------------------------<  189<H>  4.3   |  27  |  0.75    Ca    8.4<L>      04 Jun 2020 07:14  Phos  4.4     06-04  Mg     2.5     06-04    TPro  6.9  /  Alb  3.1<L>  /  TBili  0.4  /  DBili  <0.1  /  AST  28  /  ALT  119<H>  /  AlkPhos  91  06-04    RADIOLOGY:  < from: Xray Chest 1 View- PORTABLE-Urgent (06.01.20 @ 12:33) >  Impression: Subsegmental atelectasis in the LEFT lower lobe although a patch obscures the majority of the LEFT lower lobe. T and NG tubes in satisfactory position.  < end of copied text >    ECHOCARDIOGRAM:  < from: TTE Echo Complete w/o contrast w/ Doppler (06.03.20 @ 10:21) >   Impression     Summary     Mild (1+) mitral regurgitation is present.   Mild mitral annular calcification is present.   Normal aortic valve structure and function.   The left ventricle is normal in size, wallthickness, wall motion and   contractility.   Normal appearing right ventricle structure and function.   No evidence of pericardial effusion.    < end of copied text >      MEDICATIONS  (STANDING):  amphetamine/dextroamphetamine 20 milliGRAM(s) Oral <User Schedule>  cefTRIAXone Injectable. 1000 milliGRAM(s) IV Push every 24 hours  enoxaparin Injectable 40 milliGRAM(s) SubCutaneous every 12 hours  nicotine - 21 mG/24Hr(s) Patch 1 patch Transdermal daily  nystatin Powder 1 Application(s) Topical every 8 hours  predniSONE   Tablet 40 milliGRAM(s) Oral daily  senna 2 Tablet(s) Oral at bedtime  tiotropium 18 MICROgram(s) Capsule 1 Capsule(s) Inhalation daily    MEDICATIONS  (PRN):  ALBUTerol    90 MICROgram(s) HFA Inhaler 2 Puff(s) Inhalation every 4 hours PRN Shortness of Breath  ALPRAZolam 0.5 milliGRAM(s) Oral three times a day PRN Anxiety/Agitation    TELEMETRY REVIEW:  6/5 - SR 60-70s, pauses with junctional escape beats during nocturnal hours    ASSESSMENT AND PLAN:    1. Acute hypoxic respiratory failure with acute on chronic hypercapnic respiratory failure multifactorial due to COPD exacerbation 2/2 Community Acquired PNA / Substance abuse disorder (Nitrous oxide)  *Sepsis, POA, now resolved  - tele monitoring. tele review as above  - Intubated 6/1. Extubated 6/2  - Cont. tiotropium and Albuterol   - Continue Ceftriaxone, Azithromycin completed  - S/p IV solumedrol switched to oral prednisone on 6/5  - Pulm -- outpatient sleep study recc.  6/5 - patient 02 amb. 88%. pulm consulted. d/w pulm obtain CTA r/o PE, change albuterol to ATC Q6 / PRN    2. Episodic Bradycardia  - tele monitoring. tele review as above.  - refusing LINQ, MCOT on D/C -- outpatient follow up with EP on 7/27/20.   - Cardiology and EP f/u appreciated    3. Substance abuse disorder   *Nitrous oxide, Nicotine  - no ETOH withdrawal. last had ETOH a year ago  - resume home meds - Adderall / xanax   - smoking cessation advised. Cont. nicotine patch     4. DVT PPX  - Sq Lovenox     Full code    Dispo: inpatient. enhanced supervision.

## 2020-06-05 NOTE — CHART NOTE - NSCHARTNOTEFT_GEN_A_CORE
40 yo male with PMH COPD, substance abuse disorder, presented to ED c/o sob and cough, admitted with CAP required intubation.    Now doing better, extubated, had syncope after walking to the bathroom, with associated marked bradycardia on tele.  average heart rates over last 48hrs 40-70bpm, pauses with junctional escape beats during nocturnal hours.  he was recommend to have LINQ implant but refusing.  agreed to 3week MCOT.  monitor will be delivered to his home. outpatient follow up with EP on 7/27/20.   discharge planing per medicine.

## 2020-06-05 NOTE — CDI QUERY NOTE - NSCDIOTHERTXTBX_GEN_ALL_CORE_HH
Patient admitted with acute on chronic type 2 resp failure  and LLL CAP sepsis and Leukocytosis.  +   Dr. Yanes documented Sepsis    On admit temp= 102.5  HR= 110  RR= 26  WBC= 18.53    Please clarify if Sepsis was present in your discharge note  a) Sepsis poa  b) Sepsis poa, now resolved  c) No clinical evidence of sepsis  d) Other

## 2020-06-05 NOTE — PROGRESS NOTE ADULT - ATTENDING COMMENTS
As above, pt seen and examined with Dr Lynne and treatment plan formulated on rounds.
I have personally seen and examined patient today.   I discussed the case with APN and I agree with findings and plan as detailed per note above, which I have amended where appropriate.
As above, pt seen and examined with Dr Lynne and treatment plan formulated on rounds.

## 2020-06-05 NOTE — CONSULT NOTE ADULT - SUBJECTIVE AND OBJECTIVE BOX
HPI:  40 y/o male with a PMHx of COPD and nicotine abuse presents to the ED c/o SOB, coughing and wheezing for the past few days.  He  was intubated shortly after due to lethargy and hypoxia. After intubation he became hypotensive. Called Mr. Ascencio  listed as contact and left message. (01 Jun 2020 11:18)1 y/o male with a PMHx of COPD and nicotine abuse presents to the ED c/o SOB, coughing and wheezing. Patient intubated shortly after admission due to lethargy and hypoxia. Treated for acute hypoxic respiratory failure with acute on chronic hypercapnic respiratory failure due to COPD exacerbation 2/2 Community Acquired PNA and Nitrous oxide substance abuse. Extubated on 6/2. Course complicated by bradycardia and persistent hypoxia.         PAST MEDICAL & SURGICAL HISTORY:  Nicotine abuse  COPD (chronic obstructive pulmonary disease)  Bronchitis  History of hernia repair      Home Medications:  albuterol 90 mcg/inh inhalation aerosol: 2 puff(s) inhaled every 4 to 6 hours as needed (05 Jun 2020 10:36)  ALPRAZolam 0.5 mg oral tablet: 1 tab(s) orally 3 times a day, As needed, Anxiety/Agitation (05 Jun 2020 10:36)  dextroamphetamine-amphetamine 20 mg oral tablet: 1 tab(s) orally 2 times a day  ***entered off [employer]*** (01 Jun 2020 11:53)      MEDICATIONS  (STANDING):  ALBUTerol    90 MICROgram(s) HFA Inhaler 2 Puff(s) Inhalation every 6 hours  amphetamine/dextroamphetamine 20 milliGRAM(s) Oral <User Schedule>  cefTRIAXone Injectable. 1000 milliGRAM(s) IV Push every 24 hours  enoxaparin Injectable 40 milliGRAM(s) SubCutaneous every 12 hours  nicotine - 21 mG/24Hr(s) Patch 1 patch Transdermal daily  nystatin Powder 1 Application(s) Topical every 8 hours  predniSONE   Tablet 40 milliGRAM(s) Oral daily  senna 2 Tablet(s) Oral at bedtime  tiotropium 18 MICROgram(s) Capsule 1 Capsule(s) Inhalation daily    MEDICATIONS  (PRN):  ALBUTerol    90 MICROgram(s) HFA Inhaler 2 Puff(s) Inhalation every 4 hours PRN Shortness of Breath  ALPRAZolam 0.5 milliGRAM(s) Oral three times a day PRN Anxiety/Agitation      Allergies    No Known Allergies    Intolerances        SOCIAL HISTORY: Denies tobacco, etoh abuse or illicit drug use    FAMILY HISTORY:  No pertinent family history in first degree relatives      Vital Signs Last 24 Hrs  T(C): 36.8 (05 Jun 2020 09:58), Max: 36.8 (04 Jun 2020 20:47)  T(F): 98.2 (05 Jun 2020 09:58), Max: 98.2 (04 Jun 2020 20:47)  HR: 72 (05 Jun 2020 09:58) (71 - 91)  BP: 105/79 (05 Jun 2020 09:58) (105/79 - 137/77)  BP(mean): --  RR: 18 (05 Jun 2020 09:58) (18 - 18)  SpO2: 90% (05 Jun 2020 09:58) (90% - 92%)        REVIEW OF SYSTEMS:    CONSTITUTIONAL:  As per HPI.  HEENT:  Eyes:  No diplopia or blurred vision. ENT:  No earache, sore throat or runny nose.  CARDIOVASCULAR:  No pressure, squeezing, tightness, heaviness or aching about the chest, neck, axilla or epigastrium.  RESPIRATORY:  No cough, shortness of breath, PND or orthopnea.  GASTROINTESTINAL:  No nausea, vomiting or diarrhea.  GENITOURINARY:  No dysuria, frequency or urgency.  MUSCULOSKELETAL:  As per HPI.  SKIN:  No change in skin, hair or nails.  NEUROLOGIC:  No paresthesias, fasciculations, seizures or weakness.  PSYCHIATRIC:  No disorder of thought or mood.  ENDOCRINE:  No heat or cold intolerance, polyuria or polydipsia.  HEMATOLOGICAL:  No easy bruising or bleedings:  .     PHYSICAL EXAMINATION:    GENERAL APPEARANCE:  Pt. is not currently dyspneic, in no distress. Pt. is alert, oriented, and pleasant.  HEENT:  Pupils are normal and react normally. No icterus. Mucous membranes well colored.  NECK:  Supple. No lymphadenopathy. Jugular venous pressure not elevated. Carotids equal.   HEART:   The cardiac impulse has a normal quality. Regular. Normal S1 and S2. There are no murmurs, rubs or gallops noted  CHEST:  Chest is clear to auscultation. Normal respiratory effort.  ABDOMEN:  Soft and nontender.   EXTREMITIES:  There is no cyanosis, clubbing or edema.   SKIN:  No rash or significant lesions are noted.    LABS:                        12.8   14.51 )-----------( 262      ( 04 Jun 2020 07:14 )             40.7     06-04    141  |  109<H>  |  22  ----------------------------<  189<H>  4.3   |  27  |  0.75    Ca    8.4<L>      04 Jun 2020 07:14  Phos  4.4     06-04  Mg     2.5     06-04    TPro  6.9  /  Alb  3.1<L>  /  TBili  0.4  /  DBili  <0.1  /  AST  28  /  ALT  119<H>  /  AlkPhos  91  06-04    LIVER FUNCTIONS - ( 04 Jun 2020 07:14 )  Alb: 3.1 g/dL / Pro: 6.9 gm/dL / ALK PHOS: 91 U/L / ALT: 119 U/L / AST: 28 U/L / GGT: x                         RADIOLOGY & ADDITIONAL STUDIES: HPI:    39 y.o.m with a PMHx of COPD and nicotine abuse admitted c/o SOB, coughing and wheezing for the past few days.  He  was intubated shortly after due to lethargy and hypoxia. Patient intubated shortly after admission due to lethargy and hypoxia. Treated for acute hypoxic respiratory failure with acute on chronic hypercapnic respiratory failure due to COPD exacerbation & Community Acquired PNA and Nitrous oxide substance abuse. Extubated on 6/2. Course complicated by bradycardia and persistent hypoxia. pat seen for pulmonary for persistant hypoxaemia, CT angio order.      PAST MEDICAL & SURGICAL HISTORY:  Nicotine abuse  COPD (chronic obstructive pulmonary disease)  Bronchitis  History of hernia repair      Home Medications:  albuterol 90 mcg/inh inhalation aerosol: 2 puff(s) inhaled every 4 to 6 hours as needed (05 Jun 2020 10:36)  ALPRAZolam 0.5 mg oral tablet: 1 tab(s) orally 3 times a day, As needed, Anxiety/Agitation (05 Jun 2020 10:36)  dextroamphetamine-amphetamine 20 mg oral tablet: 1 tab(s) orally 2 times a day  ***entered off [employer]*** (01 Jun 2020 11:53)      MEDICATIONS  (STANDING):  ALBUTerol    90 MICROgram(s) HFA Inhaler 2 Puff(s) Inhalation every 6 hours  amphetamine/dextroamphetamine 20 milliGRAM(s) Oral <User Schedule>  cefTRIAXone Injectable. 1000 milliGRAM(s) IV Push every 24 hours  enoxaparin Injectable 40 milliGRAM(s) SubCutaneous every 12 hours  nicotine - 21 mG/24Hr(s) Patch 1 patch Transdermal daily  nystatin Powder 1 Application(s) Topical every 8 hours  predniSONE   Tablet 40 milliGRAM(s) Oral daily  senna 2 Tablet(s) Oral at bedtime  tiotropium 18 MICROgram(s) Capsule 1 Capsule(s) Inhalation daily    MEDICATIONS  (PRN):  ALBUTerol    90 MICROgram(s) HFA Inhaler 2 Puff(s) Inhalation every 4 hours PRN Shortness of Breath  ALPRAZolam 0.5 milliGRAM(s) Oral three times a day PRN Anxiety/Agitation      Allergies    No Known Allergies    Intolerances        SOCIAL HISTORY: Denies tobacco, etoh abuse or illicit drug use    FAMILY HISTORY:  No pertinent family history in first degree relatives      Vital Signs Last 24 Hrs  T(C): 36.8 (05 Jun 2020 09:58), Max: 36.8 (04 Jun 2020 20:47)  T(F): 98.2 (05 Jun 2020 09:58), Max: 98.2 (04 Jun 2020 20:47)  HR: 72 (05 Jun 2020 09:58) (71 - 91)  BP: 105/79 (05 Jun 2020 09:58) (105/79 - 137/77)  BP(mean): --  RR: 18 (05 Jun 2020 09:58) (18 - 18)  SpO2: 90% (05 Jun 2020 09:58) (90% - 92%)        REVIEW OF SYSTEMS:    CONSTITUTIONAL:  As per HPI.  HEENT:  Eyes:  No diplopia or blurred vision. ENT:  No earache, sore throat or runny nose.  CARDIOVASCULAR:  No pressure, squeezing, tightness, heaviness or aching about the chest, neck, axilla or epigastrium.  RESPIRATORY:  + cough, +shortness of breath, PND or orthopnea.  GASTROINTESTINAL:  No nausea, vomiting or diarrhea.  GENITOURINARY:  No dysuria, frequency or urgency.  MUSCULOSKELETAL:  As per HPI.  SKIN:  No change in skin, hair or nails.  NEUROLOGIC:  No paresthesias, fasciculations, seizures or weakness.  PSYCHIATRIC:  No disorder of thought or mood.  ENDOCRINE:  No heat or cold intolerance, polyuria or polydipsia.  HEMATOLOGICAL:  No easy bruising or bleedings:  .     PHYSICAL EXAMINATION:    GENERAL APPEARANCE:  Pt. is not currently dyspneic, in no distress. Pt. is alert, oriented, and pleasant.  HEENT:  Pupils are normal and react normally. No icterus. Mucous membranes well colored.  NECK:  Supple. No lymphadenopathy. Jugular venous pressure not elevated. Carotids equal.   HEART:   The cardiac impulse has a normal quality. Regular. Normal S1 and S2. There are no murmurs, rubs or gallops noted  CHEST:  Chest crackles to auscultation. Normal respiratory effort.  ABDOMEN:  Soft and nontender.   EXTREMITIES:  There is no cyanosis, clubbing or edema.   SKIN:  No rash or significant lesions are noted.    LABS:                        12.8   14.51 )-----------( 262      ( 04 Jun 2020 07:14 )             40.7     06-04    141  |  109<H>  |  22  ----------------------------<  189<H>  4.3   |  27  |  0.75    Ca    8.4<L>      04 Jun 2020 07:14  Phos  4.4     06-04  Mg     2.5     06-04    TPro  6.9  /  Alb  3.1<L>  /  TBili  0.4  /  DBili  <0.1  /  AST  28  /  ALT  119<H>  /  AlkPhos  91  06-04    LIVER FUNCTIONS - ( 04 Jun 2020 07:14 )  Alb: 3.1 g/dL / Pro: 6.9 gm/dL / ALK PHOS: 91 U/L / ALT: 119 U/L / AST: 28 U/L / GGT: x           COVID-19 PCR: NotDetec (05 Jun 2020 15:40)    Blood Gas Profile - Arterial (06.01.20 @ 12:17)    pH, Arterial: 7.16: Test Name:ABG  Called by:SUSAN  Called to:HERNANDEZ HEBERT  Read back 2 Pt IDs:Y Read back values:Y Date/Tm:06/01/20 12:28    pCO2, Arterial: 92 mmHg    pO2, Arterial: 116 mmHg    HCO3, Arterial: 31 mmoL/L    Base Excess, Arterial: -.1 mmol/L    Oxygen Saturation, Arterial: 97 %    FIO2, Arterial: 100    Blood Gas Comments Arterial: peep 5    Blood Gas Source Arterial: Arterial    Blood Gas Profile - Arterial (06.02.20 @ 08:13)    pH, Arterial: 7.41    pCO2, Arterial: 52 mmHg    pO2, Arterial: 67 mmHg    HCO3, Arterial: 32 mmoL/L    Base Excess, Arterial: 6.4 mmol/L    Oxygen Saturation, Arterial: 93 %    Blood Gas Comments Arterial: FIO2:_  MODE:_   VT:_   RATE:_   PEEP:_  Comment:_18x450 50% +10    Blood Gas Source Arterial: Arterial    RADIOLOGY & ADDITIONAL STUDIES:     CT Angio Chest PE Protocol w/ IV Cont (06.05.20 @ 14:36) >  IMPRESSION:   Motion artifact limits the study. Nonetheless, no emboli are seen. Bilateral mild patchy groundglass opacities. This maybe due to infection, including Covid 19.

## 2020-06-05 NOTE — CHART NOTE - NSCHARTNOTEFT_GEN_A_CORE
HOME O2 EVALUATION    Pulse Ox Room Air Rest: 90%    Pulse Ox Room Air Ambulatin%    Pulse Ox on O2   94%       L Ambulatin L/NC    Pulse Ox Post Ambulation:  92% Room Air

## 2020-06-05 NOTE — PROGRESS NOTE ADULT - ASSESSMENT
39 M with COPD, likely sleep apnea and bradycardia and pauses on monitor       echo  reveals preserved  EF and no significant valve disease      given bradycardia, would avoid AV roscoe blockers- will get MCOT as outpatient     it is likely that patient has elevated vagal tone due to presumed sleep apnea- recommend sleep study, adequate hydration         DC planning

## 2020-06-06 ENCOUNTER — TRANSCRIPTION ENCOUNTER (OUTPATIENT)
Age: 40
End: 2020-06-06

## 2020-06-06 VITALS — OXYGEN SATURATION: 92 %

## 2020-06-06 LAB
CULTURE RESULTS: SIGNIFICANT CHANGE UP
CULTURE RESULTS: SIGNIFICANT CHANGE UP
SPECIMEN SOURCE: SIGNIFICANT CHANGE UP
SPECIMEN SOURCE: SIGNIFICANT CHANGE UP

## 2020-06-06 PROCEDURE — 99239 HOSP IP/OBS DSCHRG MGMT >30: CPT

## 2020-06-06 RX ORDER — TIOTROPIUM BROMIDE 18 UG/1
1 CAPSULE ORAL; RESPIRATORY (INHALATION)
Qty: 30 | Refills: 0
Start: 2020-06-06 | End: 2020-07-05

## 2020-06-06 RX ADMIN — ALBUTEROL 2 PUFF(S): 90 AEROSOL, METERED ORAL at 13:23

## 2020-06-06 RX ADMIN — Medication 40 MILLIGRAM(S): at 10:45

## 2020-06-06 RX ADMIN — Medication 0.5 MILLIGRAM(S): at 11:04

## 2020-06-06 RX ADMIN — TIOTROPIUM BROMIDE 1 CAPSULE(S): 18 CAPSULE ORAL; RESPIRATORY (INHALATION) at 07:33

## 2020-06-06 RX ADMIN — ENOXAPARIN SODIUM 40 MILLIGRAM(S): 100 INJECTION SUBCUTANEOUS at 16:59

## 2020-06-06 RX ADMIN — DEXTROAMPHETAMINE SACCHARATE, AMPHETAMINE ASPARTATE, DEXTROAMPHETAMINE SULFATE AND AMPHETAMINE SULFATE 20 MILLIGRAM(S): 1.875; 1.875; 1.875; 1.875 TABLET ORAL at 10:45

## 2020-06-06 RX ADMIN — ALBUTEROL 2 PUFF(S): 90 AEROSOL, METERED ORAL at 07:37

## 2020-06-06 RX ADMIN — Medication 1 PATCH: at 10:50

## 2020-06-06 RX ADMIN — CEFTRIAXONE 1000 MILLIGRAM(S): 500 INJECTION, POWDER, FOR SOLUTION INTRAMUSCULAR; INTRAVENOUS at 14:18

## 2020-06-06 RX ADMIN — DEXTROAMPHETAMINE SACCHARATE, AMPHETAMINE ASPARTATE, DEXTROAMPHETAMINE SULFATE AND AMPHETAMINE SULFATE 20 MILLIGRAM(S): 1.875; 1.875; 1.875; 1.875 TABLET ORAL at 14:19

## 2020-06-06 RX ADMIN — NYSTATIN CREAM 1 APPLICATION(S): 100000 CREAM TOPICAL at 06:03

## 2020-06-06 RX ADMIN — ENOXAPARIN SODIUM 40 MILLIGRAM(S): 100 INJECTION SUBCUTANEOUS at 05:41

## 2020-06-06 NOTE — PROGRESS NOTE ADULT - ASSESSMENT
PROBLEMS:    Acute on chronic hypoxaemic/hypercapnic respiratory failure multifactorial due to COPD exacerbation due to Community Acquired PNA / Substance abuse disorder (Nitrous oxide)-Intubated 6/1 s/p Extubated Community Acquired Pneumonia-Bilateral mild patchy groundglass opacities-non specific-airway disease  Acute excerbation of COPD  Episodic Bradycardia  Substance abuse disorder-Nitrous oxide/Nicotine use    PLAN:    pulmonary decd home on supplement oxygen to fu in office  IV rhocephin  po prednisone  aerosols  pulse ox on ambulation  dvt prophylasix

## 2020-06-06 NOTE — DISCHARGE NOTE PROVIDER - NSDCMRMEDTOKEN_GEN_ALL_CORE_FT
albuterol 90 mcg/inh inhalation aerosol: 2 puff(s) inhaled every 4 to 6 hours as needed  ALPRAZolam 0.5 mg oral tablet: 1 tab(s) orally 3 times a day, As needed, Anxiety/Agitation  dextroamphetamine-amphetamine 20 mg oral tablet: 1 tab(s) orally 2 times a day  ***entered off [employer]***  predniSONE 10 mg oral tablet: take 4 tabs PO x 1 day, the 3 tabs PO x 3 days, then 2 tabs PO x 3 days, then 1 tab PO x 3 days, then stop  tiotropium 18 mcg inhalation capsule: 1 cap(s) inhaled once a day

## 2020-06-06 NOTE — DISCHARGE NOTE PROVIDER - PROVIDER TOKENS
PROVIDER:[TOKEN:[[id number]],FOLLOWUP:[1 week]],PROVIDER:[TOKEN:[[id number]]],PROVIDER:[TOKEN:[[id number]]]

## 2020-06-06 NOTE — DISCHARGE NOTE PROVIDER - HOSPITAL COURSE
ASSESSMENT AND PLAN:    38 y/o male with a PMHx of COPD and nicotine abuse presents to the ED c/o SOB, coughing and wheezing. Patient intubated shortly after admission due to lethargy and hypoxia. Treated for acute hypoxic respiratory failure with acute on chronic hypercapnic respiratory failure due to COPD exacerbation 2/2 Community Acquired PNA and Nitrous oxide substance abuse. Extubated on 6/2. Course complicated by bradycardia and persistent hypoxia.         1. Acute hypoxic respiratory failure with acute on chronic hypercapnic respiratory failure multifactorial due to COPD exacerbation 2/2 Community Acquired PNA / Substance abuse disorder (Nitrous oxide)    *Sepsis, POA, now resolved    - Intubated 6/1. Extubated 6/2    - Cont inhalers    - on Ceftriaxone, Azithromycin completed    - S/p IV solumedrol switched to oral prednisone on 6/5    - Pulm -- outpatient sleep study recc.    - CTA neg for PE. has bilaterla mild patchy ground glass opacities. COVID19 neg    - lasix 40 mg IV x 1 given    - pulmonary consult appreciated    - will need home oxygen- d/w  - today pt is getting restless and wants to leave the hospital. says he has to leave today "no matter what" or would leave AMA if not discharged.         2. Episodic Bradycardia    - tele monitoring. tele review as above.    - refusing LINROBERT RAMIREZOT on D/C -- outpatient follow up with EP on 7/27/20.     - Cardiology and EP f/u appreciated        3. Substance abuse disorder     *Nitrous oxide, Nicotine    - no ETOH withdrawal. last had ETOH a year ago    - resume home meds - Adderall / xanax     - smoking cessation advised. Cont. nicotine patch     -strongly encouraged abstinence from NO and other illicit drugs. pt agrees and understands and says he won't be using them any more and has talked to SW and is interested in getting help and going to substance abuse rehab        T(C): 36.5 (06-06-20 @ 07:56), Max: 36.8 (06-05-20 @ 20:26)    HR: 99 (06-06-20 @ 09:01) (63 - 99)    BP: 111/59 (06-06-20 @ 07:56) (111/59 - 145/78)    RR: 17 (06-06-20 @ 07:56) (17 - 17)    SpO2: 88% (06-06-20 @ 09:01) (88% - 95%) - sats 92% with oxygen        PHYSICAL EXAM:    Gen - Pleasant, cooperative, in no acute distress, obese    HEENT- PERRL, moist mucus membranes, OP clear    CV - RRR, No m/r/g, +pulses, no edema    Lungs - Good effort, CTA bilaterally    Abdomen - Soft, nontender, nondistended, +BS, No rebound/rigidity/guarding    Ext - No cyanosis/clubbing.     Skin - No rashes, No jaundice    Psych- Alert & oriented x 3    Neuro- fluent speech, no facial droop, EOMI. moves all ext        Dispo: discharge to home in stable condition        Final diagnosis, treatment plan, and follow-up recommendations were discussed and explained to the patient. The patient was given an opportunity to ask questions concerning the diagnosis and treatment plan. The patient acknowledged understanding of the diagnosis, treatment, and follow-up recommendations. The patient was advised to seek urgent care upon discharge if worsening symptoms develop prior to scheduled follow-up. Time spent on discharge included time with the patient, and also coordinating discharge care as outlined below.        Total time spent: 50 min

## 2020-06-06 NOTE — DISCHARGE NOTE PROVIDER - CARE PROVIDER_API CALL
Tony Liang  CRITICAL CARE MEDICINE  [address] [place] [address]  Phone: [phone]  Fax: [phone]  Follow Up Time: 1 week    TEJ HOWARD  Internal Medicine  [address] [place] [address]  Phone: [phone]  Fax: [phone]  Follow Up Time:     Yevgeniy Souza  CARDIOVASCULAR DISEASE  [address] [place] [address]  Phone: [phone]  Fax: [phone]  Follow Up Time:

## 2020-06-06 NOTE — PROGRESS NOTE ADULT - ASSESSMENT
patient has stable and chronic respiratory failure w/ hypoxia and hypercapnia. he needs home oxygen concentrator and portable oxygen at 2L with ambulation and at rest.     HOME O2 EVALUATION  Pulse Ox Room Air Rest: 89%  Pulse Ox Room Air Ambulatin%  Pulse Ox on O2  92%       L Ambulatin L/NC  Pulse Ox Post Ambulation: 94% 2  L/ NC.

## 2020-06-06 NOTE — CHART NOTE - NSCHARTNOTEFT_GEN_A_CORE
HOME O2 EVALUATION    Pulse Ox Room Air Rest: 89%    Pulse Ox Room Air Ambulatin%    Pulse Ox on O2  92%       L Ambulatin L/NC    Pulse Ox Post Ambulation: 94% 2  L/ NC

## 2020-06-06 NOTE — PROGRESS NOTE ADULT - SUBJECTIVE AND OBJECTIVE BOX
Subjective:    pat better, sitting in chair, no new complaint, Pulse Ox Room Air Rest: 89%- Pulse Ox Room Air Ambulatin%- pulse Ox on O2  92%-Ambulating 2 L/NC-Pulse Ox Post Ambulation 94% 2  L/ NC.     Home Medications:  albuterol 90 mcg/inh inhalation aerosol: 2 puff(s) inhaled every 4 to 6 hours as needed (2020 10:36)  ALPRAZolam 0.5 mg oral tablet: 1 tab(s) orally 3 times a day, As needed, Anxiety/Agitation (2020 10:36)  dextroamphetamine-amphetamine 20 mg oral tablet: 1 tab(s) orally 2 times a day  ***entered off [employer]*** (2020 11:53)    MEDICATIONS  (STANDING):  ALBUTerol    90 MICROgram(s) HFA Inhaler 2 Puff(s) Inhalation every 6 hours  amphetamine/dextroamphetamine 20 milliGRAM(s) Oral <User Schedule>  cefTRIAXone Injectable. 1000 milliGRAM(s) IV Push every 24 hours  enoxaparin Injectable 40 milliGRAM(s) SubCutaneous every 12 hours  nicotine - 21 mG/24Hr(s) Patch 1 patch Transdermal daily  nystatin Powder 1 Application(s) Topical every 8 hours  predniSONE   Tablet 40 milliGRAM(s) Oral daily  senna 2 Tablet(s) Oral at bedtime  tiotropium 18 MICROgram(s) Capsule 1 Capsule(s) Inhalation daily    MEDICATIONS  (PRN):  ALBUTerol    90 MICROgram(s) HFA Inhaler 2 Puff(s) Inhalation every 4 hours PRN Shortness of Breath  ALPRAZolam 0.5 milliGRAM(s) Oral three times a day PRN Anxiety/Agitation      Allergies    No Known Allergies    Intolerances        Vital Signs Last 24 Hrs  T(C): 36.5 (2020 07:56), Max: 36.8 (2020 09:58)  T(F): 97.7 (2020 07:56), Max: 98.2 (2020 09:58)  HR: 99 (2020 09:01) (63 - 99)  BP: 111/59 (2020 07:56) (105/79 - 145/78)  BP(mean): --  RR: 17 (2020 07:56) (17 - 18)  SpO2: 88% (2020 09:01) (88% - 95%)      PHYSICAL EXAMINATION:    NECK:  Supple. No lymphadenopathy. Jugular venous pressure not elevated. Carotids equal.   HEART:   The cardiac impulse has a normal quality. Reg., Nl S1 and S2.  There are no murmurs, rubs or gallops noted  CHEST:  Chest crackles to auscultation. Normal respiratory effort.  ABDOMEN:  Soft and nontender.   EXTREMITIES:  There is no edema.       LABS:

## 2020-06-06 NOTE — DISCHARGE NOTE PROVIDER - NSDCCPCAREPLAN_GEN_ALL_CORE_FT
PRINCIPAL DISCHARGE DIAGNOSIS  Diagnosis: Pneumonia  Assessment and Plan of Treatment: antibiotic treatment completed.      SECONDARY DISCHARGE DIAGNOSES  Diagnosis: Bradycardia  Assessment and Plan of Treatment: outpatient follow up with cardiology.    Diagnosis: Respiratory failure  Assessment and Plan of Treatment: related to COPD, suspected sleep apnea, pneumonia and illicit drug use.   home oxygen ordered.  stop using nitric oxide. no illicit drugs.  follow up with pulmonology.  suspected sleep apnea. you need sleep study as outpatient. follow up with Dr. Liang.

## 2020-06-06 NOTE — DISCHARGE NOTE NURSING/CASE MANAGEMENT/SOCIAL WORK - PATIENT PORTAL LINK FT
You can access the FollowMyHealth Patient Portal offered by [facility] by registering at the following website: [email or web address]. By joining [employer]’s FollowMyHealth portal, you will also be able to view your health information using other applications (apps) compatible with our system.

## 2020-06-09 DIAGNOSIS — R00.1 BRADYCARDIA, UNSPECIFIED: ICD-10-CM

## 2020-06-09 DIAGNOSIS — J44.1 CHRONIC OBSTRUCTIVE PULMONARY DISEASE WITH (ACUTE) EXACERBATION: ICD-10-CM

## 2020-06-09 DIAGNOSIS — I95.9 HYPOTENSION, UNSPECIFIED: ICD-10-CM

## 2020-06-09 DIAGNOSIS — Z79.899 OTHER LONG TERM (CURRENT) DRUG THERAPY: ICD-10-CM

## 2020-06-09 DIAGNOSIS — A41.9 SEPSIS, UNSPECIFIED ORGANISM: ICD-10-CM

## 2020-06-09 DIAGNOSIS — F17.210 NICOTINE DEPENDENCE, CIGARETTES, UNCOMPLICATED: ICD-10-CM

## 2020-06-09 DIAGNOSIS — J18.9 PNEUMONIA, UNSPECIFIED ORGANISM: ICD-10-CM

## 2020-06-09 DIAGNOSIS — J44.0 CHRONIC OBSTRUCTIVE PULMONARY DISEASE WITH (ACUTE) LOWER RESPIRATORY INFECTION: ICD-10-CM

## 2020-06-09 DIAGNOSIS — J96.01 ACUTE RESPIRATORY FAILURE WITH HYPOXIA: ICD-10-CM

## 2020-06-09 DIAGNOSIS — F18.19: ICD-10-CM

## 2020-06-09 DIAGNOSIS — J96.22 ACUTE AND CHRONIC RESPIRATORY FAILURE WITH HYPERCAPNIA: ICD-10-CM

## 2020-06-09 DIAGNOSIS — G47.30 SLEEP APNEA, UNSPECIFIED: ICD-10-CM

## 2020-07-20 DIAGNOSIS — I49.9 CARDIAC ARRHYTHMIA, UNSPECIFIED: ICD-10-CM

## 2020-07-27 ENCOUNTER — APPOINTMENT (OUTPATIENT)
Dept: ELECTROPHYSIOLOGY | Facility: CLINIC | Age: 40
End: 2020-07-27

## 2020-08-01 ENCOUNTER — EMERGENCY (EMERGENCY)
Facility: HOSPITAL | Age: 40
LOS: 0 days | Discharge: HOME | End: 2020-08-01
Payer: COMMERCIAL

## 2020-08-01 VITALS
HEART RATE: 107 BPM | OXYGEN SATURATION: 97 % | SYSTOLIC BLOOD PRESSURE: 120 MMHG | DIASTOLIC BLOOD PRESSURE: 60 MMHG | RESPIRATION RATE: 18 BRPM | TEMPERATURE: 98 F

## 2020-08-01 DIAGNOSIS — J44.9 CHRONIC OBSTRUCTIVE PULMONARY DISEASE, UNSPECIFIED: ICD-10-CM

## 2020-08-01 DIAGNOSIS — Z11.59 ENCOUNTER FOR SCREENING FOR OTHER VIRAL DISEASES: ICD-10-CM

## 2020-08-01 DIAGNOSIS — Z03.818 ENCOUNTER FOR OBSERVATION FOR SUSPECTED EXPOSURE TO OTHER BIOLOGICAL AGENTS RULED OUT: ICD-10-CM

## 2020-08-01 DIAGNOSIS — Z98.890 OTHER SPECIFIED POSTPROCEDURAL STATES: Chronic | ICD-10-CM

## 2020-08-01 PROCEDURE — U0003: CPT

## 2020-08-01 PROCEDURE — 99283 EMERGENCY DEPT VISIT LOW MDM: CPT

## 2020-08-01 NOTE — ED STATDOCS - PATIENT PORTAL LINK FT
You can access the FollowMyHealth Patient Portal offered by [facility] by registering at the following website: [email or web address]. By joining [employer]’s FollowMyHealth portal, you will also be able to view your health information using other applications (apps) compatible with our system.

## 2020-08-01 NOTE — ED STATDOCS - PHYSICAL EXAMINATION
Constitutional: NAD AAOx3. Nontoxic, well appearing. Speaking full sentences  w/o distress  Eyes: EOMI, pupils equal  Head: Normocephalic atraumatic  Mouth: no airway obstruction  Cardiac: [id number]   Resp: Lungs CTAB  GI: Abd s/nt/nd  Neuro: motor and sensory intact

## 2020-08-02 LAB — SARS-COV-2 RNA SPEC QL NAA+PROBE: SIGNIFICANT CHANGE UP

## 2020-08-30 ENCOUNTER — INPATIENT (INPATIENT)
Facility: HOSPITAL | Age: 40
LOS: 1 days | Discharge: HOME | DRG: 140 | End: 2020-09-01
Attending: FAMILY MEDICINE | Admitting: HOSPITALIST
Payer: COMMERCIAL

## 2020-08-30 VITALS
RESPIRATION RATE: 18 BRPM | TEMPERATURE: 100 F | SYSTOLIC BLOOD PRESSURE: 125 MMHG | DIASTOLIC BLOOD PRESSURE: 82 MMHG | HEART RATE: 95 BPM | HEIGHT: 74 IN | WEIGHT: 270.07 LBS | OXYGEN SATURATION: 92 %

## 2020-08-30 DIAGNOSIS — R06.02 SHORTNESS OF BREATH: ICD-10-CM

## 2020-08-30 DIAGNOSIS — Z98.890 OTHER SPECIFIED POSTPROCEDURAL STATES: Chronic | ICD-10-CM

## 2020-08-30 LAB
ALBUMIN SERPL ELPH-MCNC: 3.1 G/DL — LOW (ref 3.3–5)
ALP SERPL-CCNC: 94 U/L — SIGNIFICANT CHANGE UP (ref 40–120)
ALT FLD-CCNC: 61 U/L — SIGNIFICANT CHANGE UP (ref 12–78)
ANION GAP SERPL CALC-SCNC: 7 MMOL/L — SIGNIFICANT CHANGE UP (ref 5–17)
APTT BLD: 26.7 SEC — LOW (ref 27.5–35.5)
AST SERPL-CCNC: 20 U/L — SIGNIFICANT CHANGE UP (ref 15–37)
BASE EXCESS BLDV CALC-SCNC: 5 MMOL/L — HIGH (ref -2–2)
BASOPHILS # BLD AUTO: 0 K/UL — SIGNIFICANT CHANGE UP (ref 0–0.2)
BASOPHILS NFR BLD AUTO: 0 % — SIGNIFICANT CHANGE UP (ref 0–2)
BILIRUB SERPL-MCNC: 0.3 MG/DL — SIGNIFICANT CHANGE UP (ref 0.2–1.2)
BUN SERPL-MCNC: 17 MG/DL — SIGNIFICANT CHANGE UP (ref 7–23)
CALCIUM SERPL-MCNC: 9.1 MG/DL — SIGNIFICANT CHANGE UP (ref 8.5–10.1)
CHLORIDE SERPL-SCNC: 108 MMOL/L — SIGNIFICANT CHANGE UP (ref 96–108)
CO2 SERPL-SCNC: 32 MMOL/L — HIGH (ref 22–31)
CREAT SERPL-MCNC: 0.91 MG/DL — SIGNIFICANT CHANGE UP (ref 0.5–1.3)
D DIMER BLD IA.RAPID-MCNC: 290 NG/ML DDU — HIGH
EOSINOPHIL # BLD AUTO: 0.47 K/UL — SIGNIFICANT CHANGE UP (ref 0–0.5)
EOSINOPHIL NFR BLD AUTO: 4 % — SIGNIFICANT CHANGE UP (ref 0–6)
GLUCOSE SERPL-MCNC: 160 MG/DL — HIGH (ref 70–99)
HCO3 BLDV-SCNC: 32 MMOL/L — HIGH (ref 21–29)
HCT VFR BLD CALC: 41.6 % — SIGNIFICANT CHANGE UP (ref 39–50)
HGB BLD-MCNC: 12.6 G/DL — LOW (ref 13–17)
INR BLD: 0.88 RATIO — SIGNIFICANT CHANGE UP (ref 0.88–1.16)
LACTATE SERPL-SCNC: 1.8 MMOL/L — SIGNIFICANT CHANGE UP (ref 0.7–2)
LYMPHOCYTES # BLD AUTO: 2.59 K/UL — SIGNIFICANT CHANGE UP (ref 1–3.3)
LYMPHOCYTES # BLD AUTO: 22 % — SIGNIFICANT CHANGE UP (ref 13–44)
MAGNESIUM SERPL-MCNC: 2.2 MG/DL — SIGNIFICANT CHANGE UP (ref 1.6–2.6)
MCHC RBC-ENTMCNC: 30.3 GM/DL — LOW (ref 32–36)
MCHC RBC-ENTMCNC: 31 PG — SIGNIFICANT CHANGE UP (ref 27–34)
MCV RBC AUTO: 102.2 FL — HIGH (ref 80–100)
MONOCYTES # BLD AUTO: 1.3 K/UL — HIGH (ref 0–0.9)
MONOCYTES NFR BLD AUTO: 11 % — SIGNIFICANT CHANGE UP (ref 2–14)
NEUTROPHILS # BLD AUTO: 6.96 K/UL — SIGNIFICANT CHANGE UP (ref 1.8–7.4)
NEUTROPHILS NFR BLD AUTO: 58 % — SIGNIFICANT CHANGE UP (ref 43–77)
NRBC # BLD: SIGNIFICANT CHANGE UP /100 WBCS (ref 0–0)
NT-PROBNP SERPL-SCNC: 52 PG/ML — SIGNIFICANT CHANGE UP (ref 0–125)
PCO2 BLDV: 60 MMHG — HIGH (ref 35–50)
PH BLDV: 7.34 — LOW (ref 7.35–7.45)
PLATELET # BLD AUTO: 242 K/UL — SIGNIFICANT CHANGE UP (ref 150–400)
PO2 BLDV: 156 MMHG — HIGH (ref 25–45)
POTASSIUM SERPL-MCNC: 4 MMOL/L — SIGNIFICANT CHANGE UP (ref 3.5–5.3)
POTASSIUM SERPL-SCNC: 4 MMOL/L — SIGNIFICANT CHANGE UP (ref 3.5–5.3)
PROT SERPL-MCNC: 6.9 GM/DL — SIGNIFICANT CHANGE UP (ref 6–8.3)
PROTHROM AB SERPL-ACNC: 10.4 SEC — LOW (ref 10.6–13.6)
RBC # BLD: 4.07 M/UL — LOW (ref 4.2–5.8)
RBC # FLD: 15.6 % — HIGH (ref 10.3–14.5)
SAO2 % BLDV: 99 % — HIGH (ref 67–88)
SODIUM SERPL-SCNC: 147 MMOL/L — HIGH (ref 135–145)
TROPONIN I SERPL-MCNC: <0.015 NG/ML — SIGNIFICANT CHANGE UP (ref 0.01–0.04)
WBC # BLD: 11.79 K/UL — HIGH (ref 3.8–10.5)
WBC # FLD AUTO: 11.79 K/UL — HIGH (ref 3.8–10.5)

## 2020-08-30 PROCEDURE — 80053 COMPREHEN METABOLIC PANEL: CPT

## 2020-08-30 PROCEDURE — 93010 ELECTROCARDIOGRAM REPORT: CPT

## 2020-08-30 PROCEDURE — 99223 1ST HOSP IP/OBS HIGH 75: CPT

## 2020-08-30 PROCEDURE — 94761 N-INVAS EAR/PLS OXIMETRY MLT: CPT

## 2020-08-30 PROCEDURE — 94640 AIRWAY INHALATION TREATMENT: CPT

## 2020-08-30 PROCEDURE — 71045 X-RAY EXAM CHEST 1 VIEW: CPT | Mod: 26

## 2020-08-30 PROCEDURE — 85025 COMPLETE CBC W/AUTO DIFF WBC: CPT

## 2020-08-30 PROCEDURE — 97163 PT EVAL HIGH COMPLEX 45 MIN: CPT | Mod: GP

## 2020-08-30 PROCEDURE — 86769 SARS-COV-2 COVID-19 ANTIBODY: CPT

## 2020-08-30 PROCEDURE — 36415 COLL VENOUS BLD VENIPUNCTURE: CPT

## 2020-08-30 PROCEDURE — 93970 EXTREMITY STUDY: CPT | Mod: 26

## 2020-08-30 PROCEDURE — 82607 VITAMIN B-12: CPT

## 2020-08-30 PROCEDURE — 84484 ASSAY OF TROPONIN QUANT: CPT

## 2020-08-30 RX ORDER — NICOTINE POLACRILEX 2 MG
1 GUM BUCCAL DAILY
Refills: 0 | Status: DISCONTINUED | OUTPATIENT
Start: 2020-08-30 | End: 2020-08-31

## 2020-08-30 RX ORDER — ALBUTEROL 90 UG/1
2 AEROSOL, METERED ORAL
Refills: 0 | Status: DISCONTINUED | OUTPATIENT
Start: 2020-08-30 | End: 2020-08-31

## 2020-08-30 RX ORDER — AZITHROMYCIN 500 MG/1
500 TABLET, FILM COATED ORAL EVERY 24 HOURS
Refills: 0 | Status: DISCONTINUED | OUTPATIENT
Start: 2020-08-30 | End: 2020-08-31

## 2020-08-30 RX ORDER — ONDANSETRON 8 MG/1
4 TABLET, FILM COATED ORAL EVERY 6 HOURS
Refills: 0 | Status: DISCONTINUED | OUTPATIENT
Start: 2020-08-30 | End: 2020-09-01

## 2020-08-30 RX ORDER — ENOXAPARIN SODIUM 100 MG/ML
40 INJECTION SUBCUTANEOUS DAILY
Refills: 0 | Status: DISCONTINUED | OUTPATIENT
Start: 2020-08-30 | End: 2020-09-01

## 2020-08-30 RX ORDER — ALBUTEROL 90 UG/1
1 AEROSOL, METERED ORAL EVERY 4 HOURS
Refills: 0 | Status: DISCONTINUED | OUTPATIENT
Start: 2020-08-30 | End: 2020-08-31

## 2020-08-30 RX ORDER — TIOTROPIUM BROMIDE 18 UG/1
1 CAPSULE ORAL; RESPIRATORY (INHALATION) DAILY
Refills: 0 | Status: DISCONTINUED | OUTPATIENT
Start: 2020-08-30 | End: 2020-08-31

## 2020-08-30 RX ORDER — NICOTINE POLACRILEX 2 MG
4 GUM BUCCAL ONCE
Refills: 0 | Status: COMPLETED | OUTPATIENT
Start: 2020-08-30 | End: 2020-08-31

## 2020-08-30 RX ORDER — SODIUM CHLORIDE 9 MG/ML
1000 INJECTION INTRAMUSCULAR; INTRAVENOUS; SUBCUTANEOUS
Refills: 0 | Status: DISCONTINUED | OUTPATIENT
Start: 2020-08-30 | End: 2020-08-31

## 2020-08-30 RX ORDER — NICOTINE POLACRILEX 2 MG
1 GUM BUCCAL DAILY
Refills: 0 | Status: DISCONTINUED | OUTPATIENT
Start: 2020-08-30 | End: 2020-09-01

## 2020-08-30 RX ORDER — BUDESONIDE AND FORMOTEROL FUMARATE DIHYDRATE 160; 4.5 UG/1; UG/1
2 AEROSOL RESPIRATORY (INHALATION)
Refills: 0 | Status: DISCONTINUED | OUTPATIENT
Start: 2020-08-30 | End: 2020-08-31

## 2020-08-30 RX ORDER — NICOTINE POLACRILEX 2 MG
2 GUM BUCCAL
Refills: 0 | Status: DISCONTINUED | OUTPATIENT
Start: 2020-08-30 | End: 2020-09-01

## 2020-08-30 RX ORDER — ALPRAZOLAM 0.25 MG
0.5 TABLET ORAL ONCE
Refills: 0 | Status: DISCONTINUED | OUTPATIENT
Start: 2020-08-30 | End: 2020-08-30

## 2020-08-30 RX ORDER — ACETAMINOPHEN 500 MG
650 TABLET ORAL EVERY 6 HOURS
Refills: 0 | Status: DISCONTINUED | OUTPATIENT
Start: 2020-08-30 | End: 2020-09-01

## 2020-08-30 RX ADMIN — ALBUTEROL 2 PUFF(S): 90 AEROSOL, METERED ORAL at 22:15

## 2020-08-30 RX ADMIN — Medication 1 PATCH: at 23:18

## 2020-08-30 RX ADMIN — Medication 125 MILLIGRAM(S): at 21:55

## 2020-08-30 RX ADMIN — Medication 0.5 MILLIGRAM(S): at 23:18

## 2020-08-30 NOTE — H&P ADULT - NSHPPHYSICALEXAM_GEN_ALL_CORE
Vital Signs Last 24 Hrs  T(C): 37.6 (30 Aug 2020 21:01), Max: 37.6 (30 Aug 2020 21:01)  T(F): 99.6 (30 Aug 2020 21:01), Max: 99.6 (30 Aug 2020 21:01)  HR: 95 (30 Aug 2020 21:01) (95 - 95)  BP: 125/82 (30 Aug 2020 21:01) (125/82 - 125/82)  BP(mean): 90 (30 Aug 2020 21:01) (90 - 90)  RR: 18 (30 Aug 2020 21:01) (18 - 18)  SpO2: 92% (30 Aug 2020 21:01) (92% - 92%)

## 2020-08-30 NOTE — ED PROVIDER NOTE - CONSTITUTIONAL, MLM
normal... Well appearing, awake, alert, oriented to person, place, time/situation. +Obese, moderate respiratory distress.

## 2020-08-30 NOTE — ED ADULT TRIAGE NOTE - CHIEF COMPLAINT QUOTE
Pt presents to ED c/o SOB. pt reports SOB x 3 days worsening today. +swelling to bilateral feet/legs, Chest pain, +cough. Denies fevers/chills. SPO2 89% in triage Pt presents to ED c/o SOB. pt reports SOB x 3 days worsening today. +swelling to bilateral feet/legs, Chest pain, +cough. Denies fevers/chills. SPO2 89% in triage pts fathers states pt has been inhaling nitrous oxide. Pt has bee using for about a month, and stopped last Tuesday or Wednesday, and pt has been hallucinating

## 2020-08-30 NOTE — H&P ADULT - HISTORY OF PRESENT ILLNESS
39 year old male patient with extensive smoking history who was admitted and intubated in 06/2020 for COPD presented to the ED with progressively worsening shortness of breath associated with increased productive cough, diaphoresis, hallucinations and fatigue. Patient becomes fatigued with activity. Patient's father reports that he had been abusing nitrous oxide more than usual. Patient states he has not been using nitrous oxide lately but endorses eating edibles. Denies any chest pain, palpitations, abdominal pain, myalgias or fever. Endorses lower extremity edema.      In the ED patient was found to be hypoxic to 86 on room air

## 2020-08-30 NOTE — ED PROVIDER NOTE - CLINICAL SUMMARY MEDICAL DECISION MAKING FREE TEXT BOX
Cardiac workup, D-Dimer, COVID swab, b\l venous dopplers to LEs, and likely admit to hospital for respiratory distress and hypoxia.

## 2020-08-30 NOTE — ED ADULT NURSE NOTE - CHIEF COMPLAINT QUOTE
Pt presents to ED c/o SOB. pt reports SOB x 3 days worsening today. +swelling to bilateral feet/legs, Chest pain, +cough. Denies fevers/chills. SPO2 89% in triage pts fathers states pt has been inhaling nitrous oxide. Pt has bee using for about a month, and stopped last Tuesday or Wednesday, and pt has been hallucinating

## 2020-08-30 NOTE — ED ADULT NURSE NOTE - OBJECTIVE STATEMENT
Pt presents to ER c/o SOB and b/l LE edema. Onset of symptoms began 2 weeks ago and progressively got worse. Hx COPD. On arrival pt tachypneic, labored shallow respirations, equal b/l chest expansion. AO x 3 oriented to baseline, normal breathing pattern with no difficulty. Denies CP

## 2020-08-30 NOTE — ED PROVIDER NOTE - MUSCULOSKELETAL, MLM
Spine appears normal, range of motion is not limited, no muscle or joint tenderness +b\l LE edema, on left +1 pitting to knee, on right no pitting edema to mid shin.

## 2020-08-30 NOTE — ED PROVIDER NOTE - PROGRESS NOTE DETAILS
DDimer 304 on 6/5/20 with neg cta chest. today DDimer [id number], will not repeat CTA chest at this time. MD RASHI left vm on hospitalist admit phone regarding admission. MD RASHI

## 2020-08-30 NOTE — ED PROVIDER NOTE - OBJECTIVE STATEMENT
38 y/o male with PMHx of Nicotine abuse x25 years, COPD, bronchitis presents to the ED c/o SOB x3 days worsening today. Pt was admitted to  ICU in June for similar symptoms. Pt is on O2 at home, to be taken when SOB. Pt endorses b\l LE swelling, and mild non-productive cough. Denies fever and CP. No other complaints at this time. NKDA. 40 y/o male with PMHx of Nicotine abuse x25 years, COPD, bronchitis presents to the ED c/o SOB x3 days worsening today. Pt was admitted to  ICU in June for similar symptoms. Pt is on O2 at home, to be taken when SOB. Pt endorses b\l LE swelling, and mild non-productive cough. Denies fever and CP. No other complaints at this time. NKDA. Pt.'s Dad told triageRN that the pt has been inhaling nitrous oxide for about a month, stopped a few days ago. pt has been having hallucinations. pt called police today after thinking intruders were in his home.

## 2020-08-31 LAB
ALBUMIN SERPL ELPH-MCNC: 3 G/DL — LOW (ref 3.3–5)
ALP SERPL-CCNC: 95 U/L — SIGNIFICANT CHANGE UP (ref 40–120)
ALT FLD-CCNC: 61 U/L — SIGNIFICANT CHANGE UP (ref 12–78)
ANION GAP SERPL CALC-SCNC: 6 MMOL/L — SIGNIFICANT CHANGE UP (ref 5–17)
AST SERPL-CCNC: 16 U/L — SIGNIFICANT CHANGE UP (ref 15–37)
BASOPHILS # BLD AUTO: 0.04 K/UL — SIGNIFICANT CHANGE UP (ref 0–0.2)
BASOPHILS NFR BLD AUTO: 0.4 % — SIGNIFICANT CHANGE UP (ref 0–2)
BILIRUB SERPL-MCNC: 0.3 MG/DL — SIGNIFICANT CHANGE UP (ref 0.2–1.2)
BUN SERPL-MCNC: 14 MG/DL — SIGNIFICANT CHANGE UP (ref 7–23)
CALCIUM SERPL-MCNC: 8.5 MG/DL — SIGNIFICANT CHANGE UP (ref 8.5–10.1)
CHLORIDE SERPL-SCNC: 107 MMOL/L — SIGNIFICANT CHANGE UP (ref 96–108)
CO2 SERPL-SCNC: 29 MMOL/L — SIGNIFICANT CHANGE UP (ref 22–31)
CREAT SERPL-MCNC: 0.81 MG/DL — SIGNIFICANT CHANGE UP (ref 0.5–1.3)
EOSINOPHIL # BLD AUTO: 0 K/UL — SIGNIFICANT CHANGE UP (ref 0–0.5)
EOSINOPHIL NFR BLD AUTO: 0 % — SIGNIFICANT CHANGE UP (ref 0–6)
GLUCOSE SERPL-MCNC: 142 MG/DL — HIGH (ref 70–99)
HCT VFR BLD CALC: 42.4 % — SIGNIFICANT CHANGE UP (ref 39–50)
HGB BLD-MCNC: 12.5 G/DL — LOW (ref 13–17)
IMM GRANULOCYTES NFR BLD AUTO: 3.9 % — HIGH (ref 0–1.5)
LYMPHOCYTES # BLD AUTO: 1.04 K/UL — SIGNIFICANT CHANGE UP (ref 1–3.3)
LYMPHOCYTES # BLD AUTO: 10.5 % — LOW (ref 13–44)
MCHC RBC-ENTMCNC: 29.5 GM/DL — LOW (ref 32–36)
MCHC RBC-ENTMCNC: 30.4 PG — SIGNIFICANT CHANGE UP (ref 27–34)
MCV RBC AUTO: 103.2 FL — HIGH (ref 80–100)
MONOCYTES # BLD AUTO: 0.49 K/UL — SIGNIFICANT CHANGE UP (ref 0–0.9)
MONOCYTES NFR BLD AUTO: 5 % — SIGNIFICANT CHANGE UP (ref 2–14)
NEUTROPHILS # BLD AUTO: 7.92 K/UL — HIGH (ref 1.8–7.4)
NEUTROPHILS NFR BLD AUTO: 80.2 % — HIGH (ref 43–77)
PCP SPEC-MCNC: SIGNIFICANT CHANGE UP
PLATELET # BLD AUTO: 246 K/UL — SIGNIFICANT CHANGE UP (ref 150–400)
POTASSIUM SERPL-MCNC: 4.4 MMOL/L — SIGNIFICANT CHANGE UP (ref 3.5–5.3)
POTASSIUM SERPL-SCNC: 4.4 MMOL/L — SIGNIFICANT CHANGE UP (ref 3.5–5.3)
PROT SERPL-MCNC: 7.1 GM/DL — SIGNIFICANT CHANGE UP (ref 6–8.3)
RBC # BLD: 4.11 M/UL — LOW (ref 4.2–5.8)
RBC # FLD: 15.6 % — HIGH (ref 10.3–14.5)
SARS-COV-2 IGG SERPL QL IA: NEGATIVE — SIGNIFICANT CHANGE UP
SARS-COV-2 IGM SERPL IA-ACNC: 4.65 AU/ML — SIGNIFICANT CHANGE UP
SARS-COV-2 RNA SPEC QL NAA+PROBE: SIGNIFICANT CHANGE UP
SODIUM SERPL-SCNC: 142 MMOL/L — SIGNIFICANT CHANGE UP (ref 135–145)
TROPONIN I SERPL-MCNC: <0.015 NG/ML — SIGNIFICANT CHANGE UP (ref 0.01–0.04)
VIT B12 SERPL-MCNC: 1121 PG/ML — SIGNIFICANT CHANGE UP (ref 232–1245)
WBC # BLD: 9.88 K/UL — SIGNIFICANT CHANGE UP (ref 3.8–10.5)
WBC # FLD AUTO: 9.88 K/UL — SIGNIFICANT CHANGE UP (ref 3.8–10.5)

## 2020-08-31 PROCEDURE — 99233 SBSQ HOSP IP/OBS HIGH 50: CPT | Mod: GC

## 2020-08-31 RX ORDER — DEXTROAMPHETAMINE SACCHARATE, AMPHETAMINE ASPARTATE, DEXTROAMPHETAMINE SULFATE AND AMPHETAMINE SULFATE 1.875; 1.875; 1.875; 1.875 MG/1; MG/1; MG/1; MG/1
20 TABLET ORAL
Refills: 0 | Status: DISCONTINUED | OUTPATIENT
Start: 2020-08-31 | End: 2020-09-01

## 2020-08-31 RX ORDER — ALBUTEROL 90 UG/1
2 AEROSOL, METERED ORAL EVERY 4 HOURS
Refills: 0 | Status: DISCONTINUED | OUTPATIENT
Start: 2020-08-31 | End: 2020-09-01

## 2020-08-31 RX ORDER — TIOTROPIUM BROMIDE 18 UG/1
1 CAPSULE ORAL; RESPIRATORY (INHALATION) DAILY
Refills: 0 | Status: DISCONTINUED | OUTPATIENT
Start: 2020-08-31 | End: 2020-09-01

## 2020-08-31 RX ORDER — AZITHROMYCIN 500 MG/1
250 TABLET, FILM COATED ORAL DAILY
Refills: 0 | Status: DISCONTINUED | OUTPATIENT
Start: 2020-09-01 | End: 2020-09-01

## 2020-08-31 RX ORDER — ALPRAZOLAM 0.25 MG
0.5 TABLET ORAL THREE TIMES A DAY
Refills: 0 | Status: DISCONTINUED | OUTPATIENT
Start: 2020-08-31 | End: 2020-09-01

## 2020-08-31 RX ADMIN — Medication 60 MILLIGRAM(S): at 09:17

## 2020-08-31 RX ADMIN — SODIUM CHLORIDE 100 MILLILITER(S): 9 INJECTION INTRAMUSCULAR; INTRAVENOUS; SUBCUTANEOUS at 03:24

## 2020-08-31 RX ADMIN — Medication 0.5 MILLIGRAM(S): at 14:36

## 2020-08-31 RX ADMIN — Medication 1 PATCH: at 18:21

## 2020-08-31 RX ADMIN — Medication 2 MILLIGRAM(S): at 14:38

## 2020-08-31 RX ADMIN — Medication 4 MILLIGRAM(S): at 08:38

## 2020-08-31 RX ADMIN — Medication 0.5 MILLIGRAM(S): at 21:21

## 2020-08-31 RX ADMIN — ALBUTEROL 2 PUFF(S): 90 AEROSOL, METERED ORAL at 18:39

## 2020-08-31 RX ADMIN — Medication 1 PATCH: at 20:16

## 2020-08-31 RX ADMIN — ALBUTEROL 2 PUFF(S): 90 AEROSOL, METERED ORAL at 02:32

## 2020-08-31 RX ADMIN — DEXTROAMPHETAMINE SACCHARATE, AMPHETAMINE ASPARTATE, DEXTROAMPHETAMINE SULFATE AND AMPHETAMINE SULFATE 20 MILLIGRAM(S): 1.875; 1.875; 1.875; 1.875 TABLET ORAL at 10:13

## 2020-08-31 RX ADMIN — ENOXAPARIN SODIUM 40 MILLIGRAM(S): 100 INJECTION SUBCUTANEOUS at 09:17

## 2020-08-31 RX ADMIN — ALBUTEROL 2 PUFF(S): 90 AEROSOL, METERED ORAL at 00:30

## 2020-08-31 RX ADMIN — Medication 1 PATCH: at 09:19

## 2020-08-31 RX ADMIN — ALBUTEROL 2 PUFF(S): 90 AEROSOL, METERED ORAL at 21:10

## 2020-08-31 RX ADMIN — ALBUTEROL 2 PUFF(S): 90 AEROSOL, METERED ORAL at 12:48

## 2020-08-31 RX ADMIN — Medication 1 PATCH: at 19:01

## 2020-08-31 RX ADMIN — Medication 2 MILLIGRAM(S): at 19:01

## 2020-08-31 RX ADMIN — AZITHROMYCIN 255 MILLIGRAM(S): 500 TABLET, FILM COATED ORAL at 03:24

## 2020-08-31 RX ADMIN — ALBUTEROL 2 PUFF(S): 90 AEROSOL, METERED ORAL at 06:51

## 2020-08-31 NOTE — PROGRESS NOTE ADULT - ASSESSMENT
40 y/o M with extensive smoking history and COPD comes in with worsening shortness of breath found to have acute hypoxic failure due to COPD exacerbation.    #Acute hypoxemic respiratory failure due to COPD exacerbation   - Now resolved   - Switched to PO steroid today  - On albuterol q4  - On Spiriva   - Initial Azithromycin dose (500 mg today), continue daily 250 mg x4days  - Supplemental oxygen to target pulse ox of 88-92%    #DVT ppx   -lovenox

## 2020-08-31 NOTE — PHYSICAL THERAPY INITIAL EVALUATION ADULT - CRITERIA FOR SKILLED THERAPEUTIC INTERVENTIONS
pt does not require skilled PT intervention @ this time; recommend daily OOB amb with portable O2 prn on nursing floor care

## 2020-08-31 NOTE — PROGRESS NOTE ADULT - SUBJECTIVE AND OBJECTIVE BOX
S:  40 y/o M with PMHx of COPD, extensive smoking history, previously intubated in 06/2020 for COPD exacerbation  presented to the ED with progressively worsening shortness of breath associated with increased productive cough, diaphoresis, hallucinations and fatigue. Patient becomes fatigued with minimal activity. As per patient's father, his son has been abusing nitrous oxide for the past month. He also endorses bilateral lower extremity  edema that he first started noticing a month ago. In the ED patient's o2 sat was 86% on RA.     Today is hospital day 1. Patient reports he can take deeper breaths than he was able to yesterday. Earlier in the morning he was on a venturi mask, but now his oxygen saturations are in a good range while on room air. He understands that his smoking is taking a toll on his health. He has smoking 1-2 ppd for the past 25 years.     O:  Vital Signs Last 24 Hrs  T(C): 37 (31 Aug 2020 16:33), Max: 37.6 (30 Aug 2020 21:01)  T(F): 98.6 (31 Aug 2020 16:33), Max: 99.6 (30 Aug 2020 21:01)  HR: 105 (31 Aug 2020 16:33) (85 - 105)  BP: 124/60 (31 Aug 2020 16:33) (105/60 - 129/69)  BP(mean): 90 (30 Aug 2020 21:01) (90 - 90)  RR: 20 (31 Aug 2020 16:33) (18 - 24)  SpO2: 93% (31 Aug 2020 16:33) (91% - 95%)    PHYSICAL EXAM:  Constitutional: NAD, awake and alert, well-developed   HEENT: EOMI, Normal Hearing, MMM  Neck: Soft and supple, No LAD, No JVD  Respiratory: Breath sounds are clear bilaterally, No wheezing, rales or rhonchi  Cardiovascular: S1 and S2, regular rate and rhythm, no Murmurs, gallops or rubs  Gastrointestinal: Bowel Sounds present, soft, nontender, nondistended, no guarding, no rebound  Extremities: bilateral peripheral edema  Vascular: 2+ peripheral pulses  Neurological: A/O x 3  Musculoskeletal:  equal 5/5 strength b/l upper and lower extremities  Skin: No rashes  	    LABS:                      12.5   9.88  )-----------( 246      ( 31 Aug 2020 07:06 )             42.4     08-31    142  |  107  |  14  ----------------------------<  142<H>  4.4   |  29  |  0.81    Ca    8.5      31 Aug 2020 07:06  Mg     2.2     08-30    TPro  7.1  /  Alb  3.0<L>  /  TBili  0.3  /  DBili  x   /  AST  16  /  ALT  61  /  AlkPhos  95  08-31    PT/INR - ( 30 Aug 2020 21:43 )   PT: 10.4 sec;   INR: 0.88 ratio     PTT - ( 30 Aug 2020 21:43 )  PTT:26.7 sec    IMAGING:  EXAM:  XR CHEST 1 VIEW                        PROCEDURE DATE:  08/30/2020   COMPARISON:: 6/5/2020 FINDINGS: Cardiac silhouette and mediastinum within normal limits. Slight elevation right hemidiaphragm. Mild thickening of the left costophrenic angle may represent atelectasis/pleural thickening. No significant pleural effusion, vascular congestion or pneumothorax. Bony thorax intact.  IMPRESSION: No active disease findings.    EXAM:  US DPLX LWR EXT VEINS COMPL BI                        PROCEDURE DATE:  08/30/2020    IMPRESSION:  The left peroneal vein was not visualized, limiting complete evaluation. No thrombosis in the visualized bilateral lower extremity deep veins.

## 2020-08-31 NOTE — PROGRESS NOTE ADULT - ATTENDING COMMENTS
Patient seen and examined with Family Medicine Residents Jass Cantor, Estela Yousif and Renato Hamilton on the Family Medicine Teaching Service.  Agree with history, physical, labs and plan which were reviewed in detail after a face to face encounter with the patient.

## 2020-08-31 NOTE — PHYSICAL THERAPY INITIAL EVALUATION ADULT - MODALITIES TREATMENT COMMENTS
pt left in bed supine post Eval; bed alarm on; RN Kimberlee present; callbell in reach; pt instructed to have nursing S with ambulation; edenilson fair; denied pain

## 2020-09-01 ENCOUNTER — TRANSCRIPTION ENCOUNTER (OUTPATIENT)
Age: 40
End: 2020-09-01

## 2020-09-01 VITALS
OXYGEN SATURATION: 97 % | SYSTOLIC BLOOD PRESSURE: 145 MMHG | DIASTOLIC BLOOD PRESSURE: 79 MMHG | TEMPERATURE: 98 F | HEART RATE: 100 BPM

## 2020-09-01 PROCEDURE — 99239 HOSP IP/OBS DSCHRG MGMT >30: CPT | Mod: GC

## 2020-09-01 RX ORDER — ALBUTEROL 90 UG/1
2 AEROSOL, METERED ORAL EVERY 6 HOURS
Refills: 0 | Status: DISCONTINUED | OUTPATIENT
Start: 2020-09-01 | End: 2020-09-01

## 2020-09-01 RX ADMIN — ALBUTEROL 2 PUFF(S): 90 AEROSOL, METERED ORAL at 01:30

## 2020-09-01 RX ADMIN — Medication 2 MILLIGRAM(S): at 07:42

## 2020-09-01 RX ADMIN — AZITHROMYCIN 250 MILLIGRAM(S): 500 TABLET, FILM COATED ORAL at 09:11

## 2020-09-01 RX ADMIN — TIOTROPIUM BROMIDE 1 CAPSULE(S): 18 CAPSULE ORAL; RESPIRATORY (INHALATION) at 11:14

## 2020-09-01 RX ADMIN — DEXTROAMPHETAMINE SACCHARATE, AMPHETAMINE ASPARTATE, DEXTROAMPHETAMINE SULFATE AND AMPHETAMINE SULFATE 20 MILLIGRAM(S): 1.875; 1.875; 1.875; 1.875 TABLET ORAL at 09:11

## 2020-09-01 RX ADMIN — ALBUTEROL 2 PUFF(S): 90 AEROSOL, METERED ORAL at 08:20

## 2020-09-01 RX ADMIN — ENOXAPARIN SODIUM 40 MILLIGRAM(S): 100 INJECTION SUBCUTANEOUS at 09:11

## 2020-09-01 RX ADMIN — Medication 1 PATCH: at 09:12

## 2020-09-01 RX ADMIN — Medication 50 MILLIGRAM(S): at 09:11

## 2020-09-01 RX ADMIN — ALBUTEROL 2 PUFF(S): 90 AEROSOL, METERED ORAL at 13:34

## 2020-09-01 RX ADMIN — ALBUTEROL 2 PUFF(S): 90 AEROSOL, METERED ORAL at 05:51

## 2020-09-01 NOTE — CHART NOTE - NSCHARTNOTEFT_GEN_A_CORE
HOME O2 EVALUATION    Pulse Ox Room Air Rest: 91%    Pulse Ox Room Air Ambulatin%    Pulse Ox on O2      4    L Ambulatin%    Pulse Ox Post Ambulation: 92%

## 2020-09-01 NOTE — DISCHARGE NOTE PROVIDER - NSDCCPCAREPLAN_GEN_ALL_CORE_FT
PRINCIPAL DISCHARGE DIAGNOSIS  Diagnosis: COPD exacerbation  Assessment and Plan of Treatment: You were admitted to the hospital for shortness of breath caused by a COPD exacerbation. This was likely triggered by use of tobacco and nitric oxide; we strongly recommend cessation of all recreational drugs. You will complete treatment for the exacerbation by taking the following medications for 3 days, starting tomorrow:  -Prednisone 50mg daily  -Azithromycin 250mg daily  Please follow up with your primary care physician within 1 week of discharge

## 2020-09-01 NOTE — CHART NOTE - NSCHARTNOTEFT_GEN_A_CORE
Patient has COPD, which is a chronic and stable condition and is not expected to improve. He has a medical necessity for home oxygen therapy due to desaturation to 87% with ambulation. Patient has COPD, which is a chronic and stable condition and is not expected to improve. He has a medical necessity for home oxygen therapy due to desaturation to 87% with ambulation. Requires 4L nasal cannula to maintain appropriate oxygen saturation while ambulating.

## 2020-09-01 NOTE — DISCHARGE NOTE PROVIDER - NSDCMRMEDTOKEN_GEN_ALL_CORE_FT
albuterol 90 mcg/inh inhalation aerosol: 2 puff(s) inhaled every 4 to 6 hours as needed  ALPRAZolam 0.5 mg oral tablet: 1 tab(s) orally 3 times a day, As needed, Anxiety/Agitation  azithromycin 250 mg oral tablet: 1 tab(s) orally once a day  dextroamphetamine-amphetamine 20 mg oral tablet: 1 tab(s) orally 2 times a day  ***entered off DrFirst***  predniSONE 50 mg oral tablet: 1 tab(s) orally once a day  tiotropium 18 mcg inhalation capsule: 1 cap(s) inhaled once a day

## 2020-09-01 NOTE — DISCHARGE NOTE PROVIDER - HOSPITAL COURSE
39M with with COPD, tobacco use (1-2 packs daily for 25 years), marijuana use, and nitric oxide use presented for progressive dyspnea associated with productive cough, diaphoresis, hallucinations, and fatigue. At presentation, denied chest pain, palpitations, abdominal pain, myalgia, and fever. Patient was treated for COPD exacerbation with improvement in dyspnea.    While admitted, patient reported that he has chronic dyspnea with walking short distance. Stated that for a long time "I can't even walk down the driveway". Ambulatory pulse ox was done after patient's symptoms returned to baseline and it was determined that he requires home oxygen at 4L. 39M with with COPD, tobacco use (1-2 packs daily for 25 years), marijuana use, and nitric oxide use presented for progressive dyspnea associated with productive cough, diaphoresis, hallucinations, and fatigue. At presentation, denied chest pain, palpitations, abdominal pain, myalgia, and fever. Patient was treated for COPD exacerbation with improvement in dyspnea.    While admitted, patient reported that he has chronic dyspnea with walking short distance. Stated that for a long time "I can't even walk down the driveway". Ambulatory pulse ox was done after patient's symptoms returned to baseline and it was determined that he requires home oxygen at 4L.        9/1/20: Patient denies dyspnea, cough, an dchest pain        Vital Signs Last 24 Hrs    T(C): 36.6 (01 Sep 2020 08:13), Max: 37 (31 Aug 2020 16:33)    T(F): 97.9 (01 Sep 2020 08:13), Max: 98.6 (31 Aug 2020 16:33)    HR: 88 (01 Sep 2020 14:12) (77 - 106)    BP: 103/67 (01 Sep 2020 08:13) (103/67 - 126/74)    BP(mean): 72 (01 Sep 2020 08:13) (72 - 72)    RR: 18 (01 Sep 2020 08:13) (18 - 20)    SpO2: 94% (01 Sep 2020 14:12) (92% - 99%)        Physical Exam    Gen: WD, obese, NAD    HENT: EOMI, moist mucous membranes    Neck: Trachea midline    CV: RRR, no r/g/m    Pulm: CTAB, no crackles, wheezes, or rhonchi    Abd: Soft, NT, ND    Ext: No LE edema    Neuro: Grossly intact    Psych: Normal affective range            LABS:    cret                            12.5     9.88  )-----------( 246      ( 31 Aug 2020 07:06 )               42.4         08-31        142  |  107  |  14    ----------------------------<  142<H>    4.4   |  29  |  0.81        Ca    8.5      31 Aug 2020 07:06    Mg     2.2     08-30        TPro  7.1  /  Alb  3.0<L>  /  TBili  0.3  /  DBili  x   /  AST  16  /  ALT  61  /  AlkPhos  95  08-31        PT/INR - ( 30 Aug 2020 21:43 )   PT: 10.4 sec;   INR: 0.88 ratio               PTT - ( 30 Aug 2020 21:43 )  PTT:26.7 sec            < from: US Duplex Venous Lower Ext Complete, Bilateral (08.30.20 @ 22:10) >        IMPRESSION:        The left peroneal vein was not visualized, limiting complete evaluation. No thrombosis in the visualized bilateral lower extremity deep veins.        < end of copied text >        < from: Xray Chest 1 View AP/PA. (08.30.20 @ 21:45) >        IMPRESSION: No active disease findings.        < end of copied text >

## 2020-09-01 NOTE — DISCHARGE NOTE NURSING/CASE MANAGEMENT/SOCIAL WORK - NSDCPEWEB_GEN_ALL_CORE
[place] website --- [email or web address] for Tobacco Control website --- [email or web address]

## 2020-09-01 NOTE — DISCHARGE NOTE NURSING/CASE MANAGEMENT/SOCIAL WORK - NSDCPEEMAIL_GEN_ALL_CORE
[facility] for Tobacco Control email [email or web address]

## 2020-09-01 NOTE — SBIRT NOTE ADULT - NSSBIRTDRGUSEDOTHTHAN_GEN_A_CORE
Thank you for choosing [facility].  You may be receiving a survey in the mail from [facility] regarding your visit today.  Please take a few minutes to complete and return the survey to let us know how we are doing.      Our Clinic hours are:  Mondays    7:20 am - 7 pm  Tues -  Fri  7:20 am - 5 pm    Clinic Phone: [phone]    The clinic lab opens at 7:30 am Mon - Fri and appointments are required.    [facility]. [phone]  Monday-Thursday 8 am - 7pm  Tues/Wed/Fri 8 am - 5:30 pm          No

## 2020-09-02 RX ORDER — AZITHROMYCIN 500 MG/1
1 TABLET, FILM COATED ORAL
Qty: 3 | Refills: 0
Start: 2020-09-02 | End: 2020-09-04

## 2020-09-07 DIAGNOSIS — F19.19 OTHER PSYCHOACTIVE SUBSTANCE ABUSE WITH UNSPECIFIED PSYCHOACTIVE SUBSTANCE-INDUCED DISORDER: ICD-10-CM

## 2020-09-07 DIAGNOSIS — F17.200 NICOTINE DEPENDENCE, UNSPECIFIED, UNCOMPLICATED: ICD-10-CM

## 2020-09-07 DIAGNOSIS — J96.01 ACUTE RESPIRATORY FAILURE WITH HYPOXIA: ICD-10-CM

## 2020-09-07 DIAGNOSIS — E87.0 HYPEROSMOLALITY AND HYPERNATREMIA: ICD-10-CM

## 2020-09-07 DIAGNOSIS — J44.1 CHRONIC OBSTRUCTIVE PULMONARY DISEASE WITH (ACUTE) EXACERBATION: ICD-10-CM

## 2020-09-07 DIAGNOSIS — F12.10 CANNABIS ABUSE, UNCOMPLICATED: ICD-10-CM

## 2020-09-07 DIAGNOSIS — Z79.52 LONG TERM (CURRENT) USE OF SYSTEMIC STEROIDS: ICD-10-CM

## 2020-09-07 DIAGNOSIS — R53.81 OTHER MALAISE: ICD-10-CM

## 2020-09-07 DIAGNOSIS — Z79.51 LONG TERM (CURRENT) USE OF INHALED STEROIDS: ICD-10-CM

## 2020-10-07 NOTE — PROGRESS NOTE ADULT - CONSTITUTIONAL
Continue to MONITOR CLOSELY to determine the need for TREATMENT and INCREASE/DECREASE in length of time till next follow up visit. detailed exam

## 2021-07-07 ENCOUNTER — TRANSCRIPTION ENCOUNTER (OUTPATIENT)
Age: 41
End: 2021-07-07

## 2021-08-02 ENCOUNTER — INPATIENT (INPATIENT)
Facility: HOSPITAL | Age: 41
LOS: 1 days | Discharge: HOME | DRG: 816 | End: 2021-08-04
Attending: HOSPITALIST | Admitting: FAMILY MEDICINE
Payer: COMMERCIAL

## 2021-08-02 VITALS
WEIGHT: 210.1 LBS | RESPIRATION RATE: 20 BRPM | TEMPERATURE: 98 F | DIASTOLIC BLOOD PRESSURE: 84 MMHG | OXYGEN SATURATION: 95 % | HEIGHT: 74 IN | HEART RATE: 101 BPM | SYSTOLIC BLOOD PRESSURE: 142 MMHG

## 2021-08-02 DIAGNOSIS — Z98.890 OTHER SPECIFIED POSTPROCEDURAL STATES: Chronic | ICD-10-CM

## 2021-08-02 DIAGNOSIS — F19.10 OTHER PSYCHOACTIVE SUBSTANCE ABUSE, UNCOMPLICATED: ICD-10-CM

## 2021-08-02 LAB
ALBUMIN SERPL ELPH-MCNC: 3.6 G/DL — SIGNIFICANT CHANGE UP (ref 3.3–5)
ALP SERPL-CCNC: 109 U/L — SIGNIFICANT CHANGE UP (ref 40–120)
ALT FLD-CCNC: 46 U/L — SIGNIFICANT CHANGE UP (ref 12–78)
ANION GAP SERPL CALC-SCNC: 0 MMOL/L — LOW (ref 5–17)
APAP SERPL-MCNC: < 2 UG/ML (ref 10–30)
AST SERPL-CCNC: 19 U/L — SIGNIFICANT CHANGE UP (ref 15–37)
BASOPHILS # BLD AUTO: 0.08 K/UL — SIGNIFICANT CHANGE UP (ref 0–0.2)
BASOPHILS NFR BLD AUTO: 0.6 % — SIGNIFICANT CHANGE UP (ref 0–2)
BILIRUB SERPL-MCNC: 0.4 MG/DL — SIGNIFICANT CHANGE UP (ref 0.2–1.2)
BUN SERPL-MCNC: 11 MG/DL — SIGNIFICANT CHANGE UP (ref 7–23)
CALCIUM SERPL-MCNC: 8.2 MG/DL — LOW (ref 8.5–10.1)
CHLORIDE SERPL-SCNC: 108 MMOL/L — SIGNIFICANT CHANGE UP (ref 96–108)
CO2 SERPL-SCNC: 34 MMOL/L — HIGH (ref 22–31)
CREAT SERPL-MCNC: 0.83 MG/DL — SIGNIFICANT CHANGE UP (ref 0.5–1.3)
EOSINOPHIL # BLD AUTO: 0.21 K/UL — SIGNIFICANT CHANGE UP (ref 0–0.5)
EOSINOPHIL NFR BLD AUTO: 1.7 % — SIGNIFICANT CHANGE UP (ref 0–6)
ETHANOL SERPL-MCNC: <10 MG/DL — SIGNIFICANT CHANGE UP (ref 0–10)
GLUCOSE SERPL-MCNC: 113 MG/DL — HIGH (ref 70–99)
HCT VFR BLD CALC: 45.1 % — SIGNIFICANT CHANGE UP (ref 39–50)
HGB BLD-MCNC: 13.8 G/DL — SIGNIFICANT CHANGE UP (ref 13–17)
IMM GRANULOCYTES NFR BLD AUTO: 1.4 % — SIGNIFICANT CHANGE UP (ref 0–1.5)
LYMPHOCYTES # BLD AUTO: 27 % — SIGNIFICANT CHANGE UP (ref 13–44)
LYMPHOCYTES # BLD AUTO: 3.39 K/UL — HIGH (ref 1–3.3)
MCHC RBC-ENTMCNC: 30.6 GM/DL — LOW (ref 32–36)
MCHC RBC-ENTMCNC: 31.6 PG — SIGNIFICANT CHANGE UP (ref 27–34)
MCV RBC AUTO: 103.2 FL — HIGH (ref 80–100)
MONOCYTES # BLD AUTO: 0.9 K/UL — SIGNIFICANT CHANGE UP (ref 0–0.9)
MONOCYTES NFR BLD AUTO: 7.2 % — SIGNIFICANT CHANGE UP (ref 2–14)
NEUTROPHILS # BLD AUTO: 7.82 K/UL — HIGH (ref 1.8–7.4)
NEUTROPHILS NFR BLD AUTO: 62.1 % — SIGNIFICANT CHANGE UP (ref 43–77)
PLATELET # BLD AUTO: 302 K/UL — SIGNIFICANT CHANGE UP (ref 150–400)
POTASSIUM SERPL-MCNC: 4.5 MMOL/L — SIGNIFICANT CHANGE UP (ref 3.5–5.3)
POTASSIUM SERPL-SCNC: 4.5 MMOL/L — SIGNIFICANT CHANGE UP (ref 3.5–5.3)
PROT SERPL-MCNC: 7.6 GM/DL — SIGNIFICANT CHANGE UP (ref 6–8.3)
RBC # BLD: 4.37 M/UL — SIGNIFICANT CHANGE UP (ref 4.2–5.8)
RBC # FLD: 15.9 % — HIGH (ref 10.3–14.5)
SALICYLATES SERPL-MCNC: 3.1 MG/DL — SIGNIFICANT CHANGE UP (ref 2.8–20)
SODIUM SERPL-SCNC: 142 MMOL/L — SIGNIFICANT CHANGE UP (ref 135–145)
TROPONIN I SERPL-MCNC: <0.015 NG/ML — SIGNIFICANT CHANGE UP (ref 0.01–0.04)
VIT B12 SERPL-MCNC: 321 PG/ML — SIGNIFICANT CHANGE UP (ref 232–1245)
WBC # BLD: 12.57 K/UL — HIGH (ref 3.8–10.5)
WBC # FLD AUTO: 12.57 K/UL — HIGH (ref 3.8–10.5)

## 2021-08-02 PROCEDURE — 82803 BLOOD GASES ANY COMBINATION: CPT

## 2021-08-02 PROCEDURE — 99285 EMERGENCY DEPT VISIT HI MDM: CPT

## 2021-08-02 PROCEDURE — 87040 BLOOD CULTURE FOR BACTERIA: CPT

## 2021-08-02 PROCEDURE — 70498 CT ANGIOGRAPHY NECK: CPT | Mod: 26

## 2021-08-02 PROCEDURE — 86769 SARS-COV-2 COVID-19 ANTIBODY: CPT

## 2021-08-02 PROCEDURE — 71045 X-RAY EXAM CHEST 1 VIEW: CPT

## 2021-08-02 PROCEDURE — 85025 COMPLETE CBC W/AUTO DIFF WBC: CPT

## 2021-08-02 PROCEDURE — 99223 1ST HOSP IP/OBS HIGH 75: CPT

## 2021-08-02 PROCEDURE — 97116 GAIT TRAINING THERAPY: CPT | Mod: GP

## 2021-08-02 PROCEDURE — U0003: CPT

## 2021-08-02 PROCEDURE — 93010 ELECTROCARDIOGRAM REPORT: CPT

## 2021-08-02 PROCEDURE — 80048 BASIC METABOLIC PNL TOTAL CA: CPT

## 2021-08-02 PROCEDURE — 80307 DRUG TEST PRSMV CHEM ANLYZR: CPT

## 2021-08-02 PROCEDURE — 83605 ASSAY OF LACTIC ACID: CPT

## 2021-08-02 PROCEDURE — 87086 URINE CULTURE/COLONY COUNT: CPT

## 2021-08-02 PROCEDURE — 70498 CT ANGIOGRAPHY NECK: CPT

## 2021-08-02 PROCEDURE — 70496 CT ANGIOGRAPHY HEAD: CPT

## 2021-08-02 PROCEDURE — U0005: CPT

## 2021-08-02 PROCEDURE — 81001 URINALYSIS AUTO W/SCOPE: CPT

## 2021-08-02 PROCEDURE — 70496 CT ANGIOGRAPHY HEAD: CPT | Mod: 26

## 2021-08-02 PROCEDURE — 97162 PT EVAL MOD COMPLEX 30 MIN: CPT | Mod: GP

## 2021-08-02 PROCEDURE — 36415 COLL VENOUS BLD VENIPUNCTURE: CPT

## 2021-08-02 RX ORDER — NICOTINE POLACRILEX 2 MG
1 GUM BUCCAL DAILY
Refills: 0 | Status: DISCONTINUED | OUTPATIENT
Start: 2021-08-02 | End: 2021-08-03

## 2021-08-02 RX ORDER — DEXTROAMPHETAMINE SACCHARATE, AMPHETAMINE ASPARTATE, DEXTROAMPHETAMINE SULFATE AND AMPHETAMINE SULFATE 1.875; 1.875; 1.875; 1.875 MG/1; MG/1; MG/1; MG/1
1 TABLET ORAL
Qty: 0 | Refills: 0 | DISCHARGE

## 2021-08-02 RX ORDER — ACETAMINOPHEN 500 MG
650 TABLET ORAL EVERY 6 HOURS
Refills: 0 | Status: DISCONTINUED | OUTPATIENT
Start: 2021-08-02 | End: 2021-08-04

## 2021-08-02 RX ORDER — PREGABALIN 225 MG/1
1000 CAPSULE ORAL ONCE
Refills: 0 | Status: COMPLETED | OUTPATIENT
Start: 2021-08-02 | End: 2021-08-02

## 2021-08-02 RX ORDER — LANOLIN ALCOHOL/MO/W.PET/CERES
3 CREAM (GRAM) TOPICAL AT BEDTIME
Refills: 0 | Status: DISCONTINUED | OUTPATIENT
Start: 2021-08-02 | End: 2021-08-04

## 2021-08-02 RX ORDER — ONDANSETRON 8 MG/1
4 TABLET, FILM COATED ORAL EVERY 8 HOURS
Refills: 0 | Status: DISCONTINUED | OUTPATIENT
Start: 2021-08-02 | End: 2021-08-04

## 2021-08-02 RX ORDER — ENOXAPARIN SODIUM 100 MG/ML
40 INJECTION SUBCUTANEOUS DAILY
Refills: 0 | Status: DISCONTINUED | OUTPATIENT
Start: 2021-08-02 | End: 2021-08-04

## 2021-08-02 RX ORDER — SODIUM CHLORIDE 9 MG/ML
1000 INJECTION INTRAMUSCULAR; INTRAVENOUS; SUBCUTANEOUS ONCE
Refills: 0 | Status: COMPLETED | OUTPATIENT
Start: 2021-08-02 | End: 2021-08-02

## 2021-08-02 RX ORDER — ALBUTEROL 90 UG/1
2 AEROSOL, METERED ORAL
Qty: 0 | Refills: 0 | DISCHARGE

## 2021-08-02 RX ADMIN — SODIUM CHLORIDE 1000 MILLILITER(S): 9 INJECTION INTRAMUSCULAR; INTRAVENOUS; SUBCUTANEOUS at 13:55

## 2021-08-02 RX ADMIN — PREGABALIN 1000 MICROGRAM(S): 225 CAPSULE ORAL at 22:26

## 2021-08-02 NOTE — ED PROVIDER NOTE - PATIENT PORTAL LINK FT
You can access the FollowMyHealth Patient Portal offered by [facility] by registering at the following website: [email or web address]. By joining [facility]’s FollowMyHealth portal, you will also be able to view your health information using other applications (apps) compatible with our system.

## 2021-08-02 NOTE — ED ADULT TRIAGE NOTE - CHIEF COMPLAINT QUOTE
Patient comes to ED for "Whippets". Patient reports using it at home, was found by father unresponsive. patient on arrival AxOx4 with no complaints. Patient denies SI or HI

## 2021-08-02 NOTE — H&P ADULT - NSHPPHYSICALEXAM_GEN_ALL_CORE
ICU Vital Signs Last 24 Hrs  T(C): 36.8 (02 Aug 2021 20:31), Max: 36.9 (02 Aug 2021 14:30)  T(F): 98.3 (02 Aug 2021 20:31), Max: 98.4 (02 Aug 2021 14:30)  HR: 75 (02 Aug 2021 20:31) (75 - 101)  BP: 114/70 (02 Aug 2021 20:31) (114/70 - 142/84)  BP(mean): 83 (02 Aug 2021 20:31) (83 - 91)  ABP: --  ABP(mean): --  RR: 14 (02 Aug 2021 20:31) (14 - 22)  SpO2: 97% (02 Aug 2021 20:31) (95% - 97%)    General: Awake, cooperative with exam. Somnolent on exam.   Skin: Warm, dry, and pink.   Eyes: Pupils equal and reactive to light. Extraocular eye movements intact. No conjunctival injection, discharge, or scleral icterus.   HEENT: Atraumatic, normocephalic. Moist mucus membranes.  Cardiology: Normal S1, S2. No murmurs, rubs, or gallops. Regular rate and rhythm.   Respiratory: Lungs clear to ascultation bilaterally. Good air exchange. No wheezes, rales, or rhonchi. Normal chest expansion.   Gastrointestinal: Positive bowel sounds. Soft, non-tender, non-distended. No guarding, rigidity, or rebound tenderness. No hepatosplenomegaly.   Musculoskeletal: 5/5 motor strength in all extremities. Normal range of motion.   Extremities: No peripheral edema bilaterally.  Neurological: A+Ox2 (person and time). Cranial nerves 2-12 intact. Normal speech. No facial droop. No focal neurological deficits. 5/5 motor strength in all extremities. Decreased sensation of left lower extremity when compared to the right. Normal heel to shin testing.   Psychiatric: Normal affect. Normal mood.

## 2021-08-02 NOTE — ED PROVIDER NOTE - IV ALTEPLASE EXCL REL HIDDEN
Date of Service: 02/10/2021    HISTORY OF PRESENT ILLNESS:  The patient presents for skin examination.  He has a history of basal and squamous cell carcinomas and solar keratoses noted and reviewed in the problem list.  He inquires about different cutaneous lesions which are developing on his scalp and face.  He is accompanied by his son, Enoc.  He has itchy patches of skin on his trunk and extremities, which he treats successfully with Triamcinolone 0.1% cream.  He will request refills as needed.  He points to the keratotic, rough areas on the scalp and face.    PHYSICAL EXAMINATION -- DETAILED SKIN EXAM CONTINUED:  On the midline scalp vertex is an 8 mm, pink-white, scaly, rough surfaced solar keratosis.    Tan and light brown, keratotic, stuck-on, inflamed seborrheic keratoses with mild excoriations on the surface located on the left lower temple and the right upper forehead at the hairline.    Small patches of pink, red erythema and surface scale consistent with small patches of nummular eczema seen on the extremities and trunk.  Xerosis of the trunk and extremities also seen.  No other concerning skin lesions or skin problems noted.    IMPRESSION:  1.  Solar keratosis midline scalp vertex.  2.  Symptomatic inflamed seborrheic keratoses, 2 lesions.  3.  Nummular eczema.  4.  Xerosis.  5.  History of skin cancers.    TREATMENT PLAN:  1.  Discussion of management options.  2.  Liquid Nitrogen cryotherapy was applied to the single solar keratosis and to the 2 symptomatic seborrheic keratoses.  3.  Triamcinolone 0.1% cream twice daily to patches of nummular eczema on the trunk and extremities.  4.  Moisturizers for dryness twice daily.  5.  Sunscreens and self skin exams.  6.  Follow up as noted.      Dictated By: Paulie Ogden MD  Signing Provider: Paulie Ogden MD    DG/ps ([id number])  DD: 02/11/2021 13:53:55 TD: 02/12/2021 09:23:34    Copy Sent To:    show

## 2021-08-02 NOTE — ED PROVIDER NOTE - CLINICAL SUMMARY MEDICAL DECISION MAKING FREE TEXT BOX
walterets abuse found minimally unresponsive by dad. asymptomatic and eager to go home. Will check screening labs and DC. whippets abuse found minimally unresponsive by dad. asymptomatic and eager to go home now. Will check screening labs and DC.

## 2021-08-02 NOTE — ED PROVIDER NOTE - PHYSICAL EXAMINATION
*GEN: No acute distress, well appearing   *HEAD: Normocephalic, Atraumatic  *EYES/NOSE: b/l Pupils symmetric & Reactive to ligth, EOMI b/l  *THROAT: airway patent, moist mucous membranes  *NECK: Neck supple  *PULMONARY: No Respiratory distress, symmetric b/l chest rise  *CARDIAC: s1s2, regular rhythm   *ABDOMEN:  Non Tender, Non Distended, soft, no guarding, no rebound, no masses   *BACK: no CVA tenderness, No midline vertebral tenderness to palpation   *EXTREMITIES: symmetric pulses, 2+ DP & radial pulses, no cyanosis, no edema   *SKIN: no rash, no bruising   *NEURO: CN 2-12 intact, normal finger to nose & heel to shin; no dysdiadochokinesis; equal & normal strength & sensation in b/l UE/LE; full active & passive ROM in all extremeties,  no pronator drift, normal patellar reflex, normal gait, romberg sign negative   *PSYCH: appropriate concern about symptoms, pleasant

## 2021-08-02 NOTE — SBIRT NOTE ADULT - NSSBIRTSCREENAVAIL_GEN_A_CORE
[facility] health  outreached patient as per the request of Dr. Mcqueen; unit secretary, Leonardo, will assist patient to call tSBIRT: [phone]./Yes

## 2021-08-02 NOTE — H&P ADULT - NSHPREVIEWOFSYSTEMS_GEN_ALL_CORE
Constitutional: positive for fatigue, negative for fever, negative for chills, negative for decreased appetite.  Skin: negative for rashes, negative for jaundice.    Eyes: negative for blurry vision, negative for double vision.   Ears, nose, throat: negative for ear pain, negative for nasal congestion, negative for sore throat  Cardiovascular: negative for chest pain, negative for palpitations, negative for lower extremity swelling.   Respiratory: negative for shortness of breath, negative for wheezing, negative for cough.   Gastrointestinal: negative for abdominal pain, negative for nausea, negative for vomiting, negative for diarrhea, negative for constipation  Genitourinary: negative for burning on urination, negative for urinary urgency or frequency, negative for blood in the urine.   Musculoskeletal: negative for muscle/joint pain, negative for decreased range of motion.   Neurological: positive for loss of consciousness, negative for motor weakness, positive for sensory deficits.   Psychiatric: negative for depression, negative for anxiety.

## 2021-08-02 NOTE — ED PROVIDER NOTE - ATTENDING CONTRIBUTION TO CARE
Attending Attestation:  Kun Moe MD,  I have personally performed a history and physical examination of the patient and discussed management with the resident as well as the patient.  I reviewed the resident's note and agree with the documented findings and plan of care.  I have authored and modified critical sections of the Provider Note, including but not limited to HPI, ROS, Physical Exam and MDM.

## 2021-08-02 NOTE — H&P ADULT - HISTORY OF PRESENT ILLNESS
39 y/o M presented after being found unresponsive by his father around 8:30 am the day of admission. Unsure of how long the patient was unresponsive; however, the pt states it was a few hours. As per the patient, he puffed 50 cartridges of nitric oxide today which he purchased from a smoke shop in [place] in order to get high. He denies any anxiety, depression, suicidal ideation/intent/plan, social issues at home. Reports feeling fatigued and having difficulty with word finding. He reports typically smoking 4-5 cartridges of whippets a few times a month. Denies urinating on himself, biting his tongue, seizure like activity, motor deficits.     In the ER, HR , RR 19-22 with SpO2 95%. Labs: WBC 12.57, .2, Glucose 113, Calcium 8.2. EKG: NSR with HR 86 bpm, normal intervals, no ST segment changes (personally reviewed). Pt was given 1L of NS and 1000 mcg of B12. Patient was going to be discharged but he fell off his stretcher onto the floor and was noted to be very unsteady on his feet. SBIRT was called to evaluate and it was recommended he go to rehab.

## 2021-08-02 NOTE — H&P ADULT - NSHPSOCIALHISTORY_GEN_ALL_CORE
Lives by himself. Puffed 50 whippets (nitric oxide cartridges) today, usually smokes 4-5 cartridges a few times a month. Denies cocaine use. Smokes marijuana twice a week (less than 1g). Denies alcohol use. Parents are involved in his care (Mother - Chantal Veloz [phone]).

## 2021-08-02 NOTE — ED ADULT NURSE NOTE - OBJECTIVE STATEMENT
Patient presents to ED complaining of intoxication. Patient states he did approx 100 "whippets" this morning. Patient states he "passed out", denies fall, trauma, head strike. Patient was found by father who called ambulance. Patient states he uses "whippets" multiple times a week. GCS 15 on arrival, A&0x4, ambulatory, no respiratory distress noted, NSR. Patient denies dizziness, HA, blurred vision, CP, palpitations, fever, chills, NVD. O2 95% RA. PMHx COPD

## 2021-08-02 NOTE — ED ADULT NURSE REASSESSMENT NOTE - NS ED NURSE REASSESS COMMENT FT1
[id number] patient discharged awaiting transport. Patient slid off stretcher landing on hands and knees on floor. - head strike, patient denies injury, MD Billy made aware, no further workup warranted. Charge made aware, incident report written. Patient unable to ambulate safely without assistance at this point for safe discharge, patient TBA, updated patient on plan of care patient verbally understood and agreed, will continue to monitor

## 2021-08-02 NOTE — ED PROVIDER NOTE - PROGRESS NOTE DETAILS
nadia: PT seen and reassessed.  Patient symptomatically improved.  Wants to be discharged AAOX3, NAD, VSS.  Discussed test results w/ patient. Patient verbalized understanding of hospital course and outpatient plans, has decisional making capacity.  Will f/u w/ pmd in the next few days; patient will call for an appointment. Will return to the ED if there is any worsening of symptoms.  Patient able to ambulate at baseline, is tolerating PO intake. Has safe way of getting home. Pt had requested rehab per RN after receiving d/c papers. Pt seen by Social Work and given referrral numbers and assisted with making appt. Pleared again for d/c. ,Wenceslao PLASENCIA

## 2021-08-02 NOTE — H&P ADULT - ASSESSMENT
1. Acute metabolic encephalopathy secondary to nitric oxide poisoning   - Spoke to Nay Salguero from Poison control, mostly supportive care - cardiac monitoring, neuro checks, monitor respiratory rate and SpO2   - Will place on telemetry/cardiac monitor   - Obtain Utox, acetaminophen level, salicylate level, alcohol level, and VBG   - If pt spikes temperature or leukocytosis worsens, consider CXR to r/o aspiration pneumonia   - Fall precautions aspiration precautions, bedrest, will keep NPO for now - once more alert can advance diet   - PT evaluation   - Case management/social work evaluation     2. Ataxia likely secondary to nitric oxide poisoning   - f/u CTA head and neck   - Consider neurology consult if worsening symptoms; I suspect his symptoms are likely secondary to his use of whippets     3. Elevated MCV   - .2, will obtain folate level   - Vitamin B12 level 321, given 1000 mcg of B12     4. Leukocytosis likely reactive   - Monitor WBC count     5. Hypocalcemia   - Ca 8.2, albumin normal   - Monitor for now, if decreasing consider giving calcium gluconate     6. History of COPD, bronchitis, nicotine abuse   - Will order nicotine patch    DVT ppx: Lovenox 40 mg subcutaneous daily   Code status: Full code (pt agrees to chest compressions and intubation if required)    Please call Mother - Chantal Veloz [phone] with updates (she would like to speak with social work).  1. Acute metabolic encephalopathy secondary to nitric oxide poisoning   - Spoke to Nay Salguero from Poison control, mostly supportive care - cardiac monitoring, neuro checks, monitor respiratory rate and SpO2   - Will place on telemetry/cardiac monitor   - Obtain Utox, acetaminophen level, salicylate level, alcohol level, and VBG   - If pt spikes temperature or leukocytosis worsens, consider CXR to r/o aspiration pneumonia   - Fall precautions aspiration precautions, bedrest, will keep NPO for now - once more alert can advance diet   - PT evaluation   - Case management/social work evaluation     2. Ataxia likely secondary to nitric oxide poisoning   - f/u CTA head and neck   - Consider neurology consult if worsening symptoms; I suspect his symptoms are likely secondary to his use of whippets     3. Elevated MCV   - .2, f/u folate level and methylmalonic acid level   - Vitamin B12 level 321, s/p 1000 mcg of B12     4. Leukocytosis likely reactive   - Monitor WBC count     5. Hypocalcemia   - Ca 8.2, albumin normal   - Monitor for now, if decreasing consider giving calcium gluconate     6. History of COPD, bronchitis, nicotine abuse   - Will order nicotine patch    DVT ppx: Lovenox 40 mg subcutaneous daily   Code status: Full code (pt agrees to chest compressions and intubation if required)    Please call Mother - Chantal Veloz [phone] with updates (she would like to speak with social work).

## 2021-08-02 NOTE — ED PROVIDER NOTE - OBJECTIVE STATEMENT
Pertinent HPI/PMH/PSH/FHx/SHx and Review of Systems contained within  HPI:  Patient BIBEMS p/w CC found minimally unresponsive by his father on the couch. Denies CP and abd pain. Pt uses "whippets" for nitric oxide drug use.  Denies SI or thoughts of hurting others. Denies having any medical complaints.   PMH/PSH relevant for: COPD, tobacco use (1-2 packs daily for 25 years), marijuana use, and nitric oxide use, ADHD on Adderall    ROS negative for: fever, Chest pain, SOB, Nausea, vomiting, diarrhea, abdominal pain, dysuria    FamilyHx: Not otherwise contributory    SocialHx: THC use. Smoker. Nitric oxide "whippets" use. Pertinent HPI/PMH/PSH/FHx/SHx and Review of Systems contained within  HPI:  Patient BIBEMS p/w CC found minimally unresponsive by father on the couch. Denies CP and abd pain. Pt uses "whippets" for nitric oxide drug use.  Denies SI or thoughts of hurting others. Denies having any medical complaints.   PMH/PSH relevant for: COPD, tobacco use (1-2 packs daily for 25 years), marijuana use, and nitric oxide use, ADHD on Adderall    ROS negative for: fever, Chest pain, SOB, Nausea, vomiting, diarrhea, abdominal pain, dysuria    FamilyHx: Not otherwise contributory    SocialHx: THC use. Smoker. Nitric oxide "whippets" use. Pertinent HPI/PMH/PSH/FHx/SHx and Review of Systems contained within  HPI:  Patient BIBEMS p/w CC found minimally unresponsive by father on the couch. Denies CP and abd pain or any ha or any complaints. Pt used "whippets" for nitric oxide drug abuse to get high earlier today.  Denies SI or thoughts of hurting others. Denies having any medical complaints.   PMH/PSH relevant for: COPD, tobacco use (1-2 packs daily for 25 years), marijuana use, and nitric oxide use, ADHD on Adderall    ROS negative for: fever, Chest pain, SOB, Nausea, vomiting, diarrhea, abdominal pain, HA  FamilyHx: Not otherwise contributory    SocialHx: THC use. Smoker. Nitric oxide "whippets" use.

## 2021-08-03 LAB
ANION GAP SERPL CALC-SCNC: 1 MMOL/L — LOW (ref 5–17)
APPEARANCE UR: CLEAR — SIGNIFICANT CHANGE UP
BACTERIA # UR AUTO: ABNORMAL
BASE EXCESS BLDV CALC-SCNC: 2.7 MMOL/L — HIGH (ref -2–2)
BASE EXCESS BLDV CALC-SCNC: 3.7 MMOL/L — HIGH (ref -2–2)
BASOPHILS # BLD AUTO: 0.05 K/UL — SIGNIFICANT CHANGE UP (ref 0–0.2)
BASOPHILS NFR BLD AUTO: 0.5 % — SIGNIFICANT CHANGE UP (ref 0–2)
BILIRUB UR-MCNC: NEGATIVE — SIGNIFICANT CHANGE UP
BUN SERPL-MCNC: 14 MG/DL — SIGNIFICANT CHANGE UP (ref 7–23)
CALCIUM SERPL-MCNC: 8.3 MG/DL — LOW (ref 8.5–10.1)
CHLORIDE SERPL-SCNC: 106 MMOL/L — SIGNIFICANT CHANGE UP (ref 96–108)
CO2 SERPL-SCNC: 34 MMOL/L — HIGH (ref 22–31)
COLOR SPEC: YELLOW — SIGNIFICANT CHANGE UP
COMMENT - URINE: SIGNIFICANT CHANGE UP
COVID-19 SPIKE DOMAIN AB INTERP: POSITIVE
COVID-19 SPIKE DOMAIN ANTIBODY RESULT: >250 U/ML — HIGH
CREAT SERPL-MCNC: 0.84 MG/DL — SIGNIFICANT CHANGE UP (ref 0.5–1.3)
DIFF PNL FLD: NEGATIVE — SIGNIFICANT CHANGE UP
EOSINOPHIL # BLD AUTO: 0.18 K/UL — SIGNIFICANT CHANGE UP (ref 0–0.5)
EOSINOPHIL NFR BLD AUTO: 1.8 % — SIGNIFICANT CHANGE UP (ref 0–6)
EPI CELLS # UR: SIGNIFICANT CHANGE UP
GLUCOSE SERPL-MCNC: 112 MG/DL — HIGH (ref 70–99)
GLUCOSE UR QL: NEGATIVE MG/DL — SIGNIFICANT CHANGE UP
HCO3 BLDV-SCNC: 31 MMOL/L — HIGH (ref 21–29)
HCO3 BLDV-SCNC: 33 MMOL/L — HIGH (ref 21–29)
HCT VFR BLD CALC: 45.1 % — SIGNIFICANT CHANGE UP (ref 39–50)
HGB BLD-MCNC: 13.6 G/DL — SIGNIFICANT CHANGE UP (ref 13–17)
IMM GRANULOCYTES NFR BLD AUTO: 1.7 % — HIGH (ref 0–1.5)
KETONES UR-MCNC: NEGATIVE — SIGNIFICANT CHANGE UP
LACTATE SERPL-SCNC: 1.2 MMOL/L — SIGNIFICANT CHANGE UP (ref 0.7–2)
LEUKOCYTE ESTERASE UR-ACNC: NEGATIVE — SIGNIFICANT CHANGE UP
LYMPHOCYTES # BLD AUTO: 2.38 K/UL — SIGNIFICANT CHANGE UP (ref 1–3.3)
LYMPHOCYTES # BLD AUTO: 23.3 % — SIGNIFICANT CHANGE UP (ref 13–44)
MCHC RBC-ENTMCNC: 30.2 GM/DL — LOW (ref 32–36)
MCHC RBC-ENTMCNC: 31.3 PG — SIGNIFICANT CHANGE UP (ref 27–34)
MCV RBC AUTO: 103.9 FL — HIGH (ref 80–100)
MONOCYTES # BLD AUTO: 0.66 K/UL — SIGNIFICANT CHANGE UP (ref 0–0.9)
MONOCYTES NFR BLD AUTO: 6.5 % — SIGNIFICANT CHANGE UP (ref 2–14)
NEUTROPHILS # BLD AUTO: 6.79 K/UL — SIGNIFICANT CHANGE UP (ref 1.8–7.4)
NEUTROPHILS NFR BLD AUTO: 66.2 % — SIGNIFICANT CHANGE UP (ref 43–77)
NITRITE UR-MCNC: NEGATIVE — SIGNIFICANT CHANGE UP
PCO2 BLDV: 65 MMHG — HIGH (ref 35–50)
PCO2 BLDV: 85 MMHG — HIGH (ref 35–50)
PH BLDV: 7.22 — LOW (ref 7.35–7.45)
PH BLDV: 7.31 — LOW (ref 7.35–7.45)
PH UR: 5 — SIGNIFICANT CHANGE UP (ref 5–8)
PLATELET # BLD AUTO: 275 K/UL — SIGNIFICANT CHANGE UP (ref 150–400)
PO2 BLDV: 136 MMHG — HIGH (ref 25–45)
PO2 BLDV: 48 MMHG — HIGH (ref 25–45)
POTASSIUM SERPL-MCNC: 4.2 MMOL/L — SIGNIFICANT CHANGE UP (ref 3.5–5.3)
POTASSIUM SERPL-SCNC: 4.2 MMOL/L — SIGNIFICANT CHANGE UP (ref 3.5–5.3)
PROT UR-MCNC: 15 MG/DL
RBC # BLD: 4.34 M/UL — SIGNIFICANT CHANGE UP (ref 4.2–5.8)
RBC # FLD: 16.2 % — HIGH (ref 10.3–14.5)
RBC CASTS # UR COMP ASSIST: SIGNIFICANT CHANGE UP /HPF (ref 0–4)
SAO2 % BLDV: 74 % — SIGNIFICANT CHANGE UP (ref 67–88)
SAO2 % BLDV: 99 % — HIGH (ref 67–88)
SARS-COV-2 IGG+IGM SERPL QL IA: >250 U/ML — HIGH
SARS-COV-2 IGG+IGM SERPL QL IA: POSITIVE
SARS-COV-2 RNA SPEC QL NAA+PROBE: SIGNIFICANT CHANGE UP
SODIUM SERPL-SCNC: 141 MMOL/L — SIGNIFICANT CHANGE UP (ref 135–145)
SP GR SPEC: 1.02 — SIGNIFICANT CHANGE UP (ref 1.01–1.02)
UROBILINOGEN FLD QL: 1 MG/DL
WBC # BLD: 10.23 K/UL — SIGNIFICANT CHANGE UP (ref 3.8–10.5)
WBC # FLD AUTO: 10.23 K/UL — SIGNIFICANT CHANGE UP (ref 3.8–10.5)
WBC UR QL: SIGNIFICANT CHANGE UP

## 2021-08-03 PROCEDURE — 99232 SBSQ HOSP IP/OBS MODERATE 35: CPT | Mod: GC

## 2021-08-03 PROCEDURE — 71045 X-RAY EXAM CHEST 1 VIEW: CPT | Mod: 26

## 2021-08-03 RX ORDER — NICOTINE POLACRILEX 2 MG
1 GUM BUCCAL DAILY
Refills: 0 | Status: DISCONTINUED | OUTPATIENT
Start: 2021-08-03 | End: 2021-08-04

## 2021-08-03 RX ADMIN — Medication 650 MILLIGRAM(S): at 02:14

## 2021-08-03 RX ADMIN — Medication 1 PATCH: at 21:46

## 2021-08-03 RX ADMIN — Medication 650 MILLIGRAM(S): at 20:42

## 2021-08-03 RX ADMIN — ENOXAPARIN SODIUM 40 MILLIGRAM(S): 100 INJECTION SUBCUTANEOUS at 11:10

## 2021-08-03 RX ADMIN — Medication 1 PATCH: at 11:11

## 2021-08-03 NOTE — PROVIDER CONTACT NOTE (OTHER) - BACKGROUND
Pt admitted for nitric oxide poisoning. Straight cathed at 1500 with 1000mL removed. Patient has not voided since.

## 2021-08-03 NOTE — ED ADULT NURSE REASSESSMENT NOTE - NS ED NURSE REASSESS COMMENT FT1
Plan of care reviewed with pt. Since venous blood gas resulted, O2 sat closely monitored. Pt made aware of plan.

## 2021-08-03 NOTE — ED ADULT NURSE REASSESSMENT NOTE - NS ED NURSE REASSESS COMMENT FT1
Pt on cardiac monitor with continuous pulse ox. Dr. Murray made aware of pt's oral temp. Dr. Murray will order accordingly.

## 2021-08-03 NOTE — CHART NOTE - NSCHARTNOTEFT_GEN_A_CORE
Urinary retention. s/p straight cath 1000cc, bladder scan q6 and monitor, place mckenzie if retaining after 2 straight cath interventions.

## 2021-08-03 NOTE — PROGRESS NOTE ADULT - SUBJECTIVE AND OBJECTIVE BOX
40M with h/o COPD, smoker, obesity, presented to the ED after being found unresponsive by his father around 8:30 on 08/02. He was unsure of how long the patient was unresponsive; however, the patient states it was a few hours. As per the patient, he puffed 50 cartridges of nitric oxide today which he purchased from a smoke shop in [place] in order to get high. He reports typically smoking 4-5 cartridges of whippets a few times a month, but did more this time because he "felt bored and had nothing else to do". He denies any anxiety, depression, suicidal ideation/intent/plan, social issues at home. Reported feeling fatigued and having difficulty with word finding. Denied urinating on himself, biting his tongue, seizure like activity, motor deficits.     ED event: Patient was planned to be discharged from the ED when he slid off the stretcher and onto the floor, no head trauma, no LOC. Patient reported feeling of unsteadiness, noted to be unsafe for discharge at the time. Patient admitted to the floor with tele monitoring.     08/03: Spoke to patient this morning, easily awoken from sleep but still somnolent and quickly went back to sleep after examination. He has no acute complaints at this time. He denies fevers, chills, chest pain, shortness of breath, headache, visual disturbances, dizziness. Patient understands the plan to continue to stay in the hospital for tele monitoring.     REVIEW OF SYSTEMS:  CONSTITUTIONAL: (-) weakness, (-) fevers, (-) chills  EYES/ENT: (-) visual changes,  (-) vertigo,  (-) throat pain   NECK:  (-) pain, (-) stiffness  RESPIRATORY:  (-) shortness of breath, (-) cough,  (-) wheezing,  (-) hemoptysis   CARDIOVASCULAR:  (-) chest pain, (-) palpitations  GASTROINTESTINAL:  (-) abdominal or epigastric pain, (-) nausea, (-) vomiting, (-) diarrhea, (-) constipation, (-) melena,  (-) hematemesis,  (-) hematochezia  GENITOURINARY: (-) dysuria, (-) frequency, (-) hematuria  NEUROLOGICAL: (-) numbness, (-) weakness  SKIN: (-) itching, (-) rashes, (-) lesions    PHYSICAL EXAM:  Vital Signs Last 24 Hrs  T(C): 37.2 (03 Aug 2021 09:01), Max: 38.1 (03 Aug 2021 04:45)  T(F): 98.9 (03 Aug 2021 09:01), Max: 100.5 (03 Aug 2021 04:45)  HR: 75 (03 Aug 2021 09:01) (75 - 92)  BP: 125/71 (03 Aug 2021 09:01) (108/78 - 128/81)  BP(mean): 88 (03 Aug 2021 06:22) (81 - 92)  RR: 18 (03 Aug 2021 09:01) (14 - 26)  SpO2: 98% (03 Aug 2021 09:01) (85% - 100%)    PHYSICAL EXAM:  GENERAL: NAD, lying in bed comfortably. Awoke patient from sleep this morning, but somnolent.   HEAD:  Atraumatic, Normocephalic  EYES: EOMI, PERRLA, conjunctiva and sclera clear.  ENT: Moist mucous membranes  NECK: Supple,  CHEST/LUNG: Clear to auscultation bilaterally, good air entry bilaterally; No wheezing, rales, or rhonchi. Unlabored respirations  HEART: Regular rate and rhythm. S1 and S2. No murmurs, rubs, or gallops  ABDOMEN: Soft, Nontender, Nondistended. Bowel sounds present x4 quadrants; No hepatomegaly. No splenomegaly.  EXTREMITIES:  2+ Peripheral Pulses. Capillary refill <2 seconds. No clubbing, cyanosis, or edema  NERVOUS SYSTEM:  Alert & Oriented X3, speech clear. Cranial nerves II-XII intact, no focal neurologic deficits. Sensation to light touch equal on bilateral upper and lower extremities.   MSK: FROM all 4 extremities, full and equal strength 5/5  SKIN: No rashes, bruises, or other lesions    MEDICATIONS:  MEDICATIONS  (STANDING):  enoxaparin Injectable 40 milliGRAM(s) SubCutaneous daily  nicotine - 21 mG/24Hr(s) Patch 1 Patch Transdermal daily      LABS: All Labs Reviewed:                        13.6   10.23 )-----------( 275      ( 03 Aug 2021 05:44 )             45.1     08-03    141  |  106  |  14  ----------------------------<  112<H>  4.2   |  34<H>  |  0.84    Ca    8.3<L>      03 Aug 2021 05:44    TPro  7.6  /  Alb  3.6  /  TBili  0.4  /  DBili  x   /  AST  19  /  ALT  46  /  AlkPhos  109  08-02      CARDIAC MARKERS ( 02 Aug 2021 13:31 )  <0.015 ng/mL / x     / x     / x     / x          Blood Culture:   I&O's Summary    03 Aug 2021 07:01  -  03 Aug 2021 16:07  --------------------------------------------------------  IN: 0 mL / OUT: 1000 mL / NET: -1000 mL      CAPILLARY BLOOD GLUCOSE          RADIOLOGY/EKG:  < from: Xray Chest 1 View- PORTABLE-Urgent (Xray Chest 1 View- PORTABLE-Urgent .) (08.03.21 @ 06:14) >  IMPRESSION:   Subtle airspace disease RIGHT upper lobe abutting minor fissure. Asymmetric haziness  LEFT lung base concerning for LEFT effusion layering along posterior thoracic wall.  < end of copied text >      < from: CT Angio Neck w/ IV Cont (08.02.21 @ 00:00) >  IMPRESSION:  CT Head: No acute intracranial hemorrhage or mass effect.  CTA Neck: No hemodynamically significant stenosis, dissection, or pseudoaneurysm.  CTA Head: No major vascular occlusion, significant stenosis, dissection, or saccular aneurysm.  < end of copied text >             40M with h/o COPD, smoker, obesity, presented to the ED after being found unresponsive by his father around 8:30 on 08/02. He was unsure of how long the patient was unresponsive; however, the patient states it was a few hours. As per the patient, he puffed 50 cartridges of nitric oxide today which he purchased from a smoke shop in [place] in order to get high. He reports typically smoking 4-5 cartridges of whippets a few times a month, but did more this time because he "felt bored and had nothing else to do". He denies any anxiety, depression, suicidal ideation/intent/plan, social issues at home. Reported feeling fatigued and having difficulty with word finding. Denied urinating on himself, biting his tongue, seizure like activity, motor deficits.     ED event: Patient was planned to be discharged from the ED when he slid off the stretcher and onto the floor, no head trauma, no LOC. Patient reported feeling of unsteadiness, noted to be unsafe for discharge at the time. Patient admitted to the floor with tele monitoring.     08/03: Spoke to patient this morning, easily awoken from sleep but still somnolent and quickly went back to sleep after examination. He has no acute complaints at this time. He denies fevers, chills, chest pain, shortness of breath, headache, visual disturbances, dizziness. Patient understands the plan to continue to stay in the hospital for tele monitoring.   Spoke with patient's mother 08/03 @5:20pm. Informed her of patient's status and recent labs/imaging. She also expressed desire to have the patient go to rehab service if a bed is available at the time of discharge. Wished to speak to .     REVIEW OF SYSTEMS:  CONSTITUTIONAL: (-) weakness, (-) fevers, (-) chills  EYES/ENT: (-) visual changes,  (-) vertigo,  (-) throat pain   NECK:  (-) pain, (-) stiffness  RESPIRATORY:  (-) shortness of breath, (-) cough,  (-) wheezing,  (-) hemoptysis   CARDIOVASCULAR:  (-) chest pain, (-) palpitations  GASTROINTESTINAL:  (-) abdominal or epigastric pain, (-) nausea, (-) vomiting, (-) diarrhea, (-) constipation, (-) melena,  (-) hematemesis,  (-) hematochezia  GENITOURINARY: (-) dysuria, (-) frequency, (-) hematuria  NEUROLOGICAL: (-) numbness, (-) weakness  SKIN: (-) itching, (-) rashes, (-) lesions    PHYSICAL EXAM:  Vital Signs Last 24 Hrs  T(C): 37.2 (03 Aug 2021 09:01), Max: 38.1 (03 Aug 2021 04:45)  T(F): 98.9 (03 Aug 2021 09:01), Max: 100.5 (03 Aug 2021 04:45)  HR: 75 (03 Aug 2021 09:01) (75 - 92)  BP: 125/71 (03 Aug 2021 09:01) (108/78 - 128/81)  BP(mean): 88 (03 Aug 2021 06:22) (81 - 92)  RR: 18 (03 Aug 2021 09:01) (14 - 26)  SpO2: 98% (03 Aug 2021 09:01) (85% - 100%)    PHYSICAL EXAM:  GENERAL: NAD, lying in bed comfortably. Awoke patient from sleep this morning, but somnolent.   HEAD:  Atraumatic, Normocephalic  EYES: EOMI, PERRLA, conjunctiva and sclera clear.  ENT: Moist mucous membranes  NECK: Supple,  CHEST/LUNG: Clear to auscultation bilaterally, good air entry bilaterally; No wheezing, rales, or rhonchi. Unlabored respirations  HEART: Regular rate and rhythm. S1 and S2. No murmurs, rubs, or gallops  ABDOMEN: Soft, Nontender, Nondistended. Bowel sounds present x4 quadrants; No hepatomegaly. No splenomegaly.  EXTREMITIES:  2+ Peripheral Pulses. Capillary refill <2 seconds. No clubbing, cyanosis, or edema  NERVOUS SYSTEM:  Alert & Oriented X3, speech clear. Cranial nerves II-XII intact, no focal neurologic deficits. Sensation to light touch equal on bilateral upper and lower extremities.   MSK: FROM all 4 extremities, full and equal strength 5/5  SKIN: No rashes, bruises, or other lesions    MEDICATIONS:  MEDICATIONS  (STANDING):  enoxaparin Injectable 40 milliGRAM(s) SubCutaneous daily  nicotine - 21 mG/24Hr(s) Patch 1 Patch Transdermal daily      LABS: All Labs Reviewed:                        13.6   10.23 )-----------( 275      ( 03 Aug 2021 05:44 )             45.1     08-03    141  |  106  |  14  ----------------------------<  112<H>  4.2   |  34<H>  |  0.84    Ca    8.3<L>      03 Aug 2021 05:44    TPro  7.6  /  Alb  3.6  /  TBili  0.4  /  DBili  x   /  AST  19  /  ALT  46  /  AlkPhos  109  08-02      CARDIAC MARKERS ( 02 Aug 2021 13:31 )  <0.015 ng/mL / x     / x     / x     / x          Blood Culture:   I&O's Summary    03 Aug 2021 07:01  -  03 Aug 2021 16:07  --------------------------------------------------------  IN: 0 mL / OUT: 1000 mL / NET: -1000 mL      CAPILLARY BLOOD GLUCOSE          RADIOLOGY/EKG:  < from: Xray Chest 1 View- PORTABLE-Urgent (Xray Chest 1 View- PORTABLE-Urgent .) (08.03.21 @ 06:14) >  IMPRESSION:   Subtle airspace disease RIGHT upper lobe abutting minor fissure. Asymmetric haziness  LEFT lung base concerning for LEFT effusion layering along posterior thoracic wall.  < end of copied text >      < from: CT Angio Neck w/ IV Cont (08.02.21 @ 00:00) >  IMPRESSION:  CT Head: No acute intracranial hemorrhage or mass effect.  CTA Neck: No hemodynamically significant stenosis, dissection, or pseudoaneurysm.  CTA Head: No major vascular occlusion, significant stenosis, dissection, or saccular aneurysm.  < end of copied text >             40M with h/o COPD, smoker, obesity, presented to the ED after being found unresponsive by his father around 8:30 on 08/02. He was unsure of how long the patient was unresponsive; however, the patient states it was a few hours. As per the patient, he puffed 50 cartridges of nitric oxide today which he purchased from a smoke shop in [place] in order to get high. He reports typically smoking 4-5 cartridges of whippets a few times a month, but did more this time because he "felt bored and had nothing else to do". He denies any anxiety, depression, suicidal ideation/intent/plan, social issues at home. Reported feeling fatigued and having difficulty with word finding. Denied urinating on himself, biting his tongue, seizure like activity, motor deficits.     ED event: Patient was planned to be discharged from the ED when he slid off the stretcher and onto the floor, no head trauma, no LOC. Patient reported feeling of unsteadiness, noted to be unsafe for discharge at the time. Patient admitted to the floor with tele monitoring.     08/03: Spoke to patient this morning, easily awoken from sleep but still somnolent and quickly went back to sleep after examination. He has no acute complaints at this time. He denies fevers, chills, chest pain, shortness of breath, headache, visual disturbances, dizziness. Patient understands the plan to continue to stay in the hospital for tele monitoring.   Spoke with patient's mother 08/03 @5:20pm. She expressed desire to have the patient go to rehab service if a bed is available at the time of discharge. Wished to speak to .     REVIEW OF SYSTEMS:  CONSTITUTIONAL: (-) weakness, (-) fevers, (-) chills  EYES/ENT: (-) visual changes,  (-) vertigo,  (-) throat pain   NECK:  (-) pain, (-) stiffness  RESPIRATORY:  (-) shortness of breath, (-) cough,  (-) wheezing,  (-) hemoptysis   CARDIOVASCULAR:  (-) chest pain, (-) palpitations  GASTROINTESTINAL:  (-) abdominal or epigastric pain, (-) nausea, (-) vomiting, (-) diarrhea, (-) constipation, (-) melena,  (-) hematemesis,  (-) hematochezia  GENITOURINARY: (-) dysuria, (-) frequency, (-) hematuria  NEUROLOGICAL: (-) numbness, (-) weakness  SKIN: (-) itching, (-) rashes, (-) lesions    PHYSICAL EXAM:  Vital Signs Last 24 Hrs  T(C): 37.2 (03 Aug 2021 09:01), Max: 38.1 (03 Aug 2021 04:45)  T(F): 98.9 (03 Aug 2021 09:01), Max: 100.5 (03 Aug 2021 04:45)  HR: 75 (03 Aug 2021 09:01) (75 - 92)  BP: 125/71 (03 Aug 2021 09:01) (108/78 - 128/81)  BP(mean): 88 (03 Aug 2021 06:22) (81 - 92)  RR: 18 (03 Aug 2021 09:01) (14 - 26)  SpO2: 98% (03 Aug 2021 09:01) (85% - 100%)    PHYSICAL EXAM:  GENERAL: NAD, lying in bed comfortably. Awoke patient from sleep this morning, but somnolent.   HEAD:  Atraumatic, Normocephalic  EYES: EOMI, PERRLA, conjunctiva and sclera clear.  ENT: Moist mucous membranes  NECK: Supple,  CHEST/LUNG: Clear to auscultation bilaterally, good air entry bilaterally; No wheezing, rales, or rhonchi. Unlabored respirations  HEART: Regular rate and rhythm. S1 and S2. No murmurs, rubs, or gallops  ABDOMEN: Soft, Nontender, Nondistended. Bowel sounds present x4 quadrants; No hepatomegaly. No splenomegaly.  EXTREMITIES:  2+ Peripheral Pulses. Capillary refill <2 seconds. No clubbing, cyanosis, or edema  NERVOUS SYSTEM:  Alert & Oriented X3, speech clear. Cranial nerves II-XII intact, no focal neurologic deficits. Sensation to light touch equal on bilateral upper and lower extremities.   MSK: FROM all 4 extremities, full and equal strength 5/5  SKIN: No rashes, bruises, or other lesions    MEDICATIONS:  MEDICATIONS  (STANDING):  enoxaparin Injectable 40 milliGRAM(s) SubCutaneous daily  nicotine - 21 mG/24Hr(s) Patch 1 Patch Transdermal daily      LABS: All Labs Reviewed:                        13.6   10.23 )-----------( 275      ( 03 Aug 2021 05:44 )             45.1     08-03    141  |  106  |  14  ----------------------------<  112<H>  4.2   |  34<H>  |  0.84    Ca    8.3<L>      03 Aug 2021 05:44    TPro  7.6  /  Alb  3.6  /  TBili  0.4  /  DBili  x   /  AST  19  /  ALT  46  /  AlkPhos  109  08-02      CARDIAC MARKERS ( 02 Aug 2021 13:31 )  <0.015 ng/mL / x     / x     / x     / x          Blood Culture:   I&O's Summary    03 Aug 2021 07:01  -  03 Aug 2021 16:07  --------------------------------------------------------  IN: 0 mL / OUT: 1000 mL / NET: -1000 mL      CAPILLARY BLOOD GLUCOSE          RADIOLOGY/EKG:  < from: Xray Chest 1 View- PORTABLE-Urgent (Xray Chest 1 View- PORTABLE-Urgent .) (08.03.21 @ 06:14) >  IMPRESSION:   Subtle airspace disease RIGHT upper lobe abutting minor fissure. Asymmetric haziness  LEFT lung base concerning for LEFT effusion layering along posterior thoracic wall.  < end of copied text >      < from: CT Angio Neck w/ IV Cont (08.02.21 @ 00:00) >  IMPRESSION:  CT Head: No acute intracranial hemorrhage or mass effect.  CTA Neck: No hemodynamically significant stenosis, dissection, or pseudoaneurysm.  CTA Head: No major vascular occlusion, significant stenosis, dissection, or saccular aneurysm.  < end of copied text >             40M with h/o COPD, smoker, obesity, presented to the ED after being found unresponsive by his father around 8:30 on 08/02. He was unsure of how long the patient was unresponsive; however, the patient states it was a few hours. As per the patient, he puffed 50 cartridges of nitric oxide today which he purchased from a smoke shop in [place] in order to get high. He reports typically smoking 4-5 cartridges of whippets a few times a month, but did more this time because he "felt bored and had nothing else to do". He denies any anxiety, depression, suicidal ideation/intent/plan, social issues at home. Reported feeling fatigued and having difficulty with word finding. Denied urinating on himself, biting his tongue, seizure like activity, motor deficits.     ED event: Patient was planned to be discharged from the ED when he slid off the stretcher and onto the floor, no head trauma, no LOC. Patient reported feeling of unsteadiness, noted to be unsafe for discharge at the time. Patient admitted to the floor with tele monitoring.     08/03: Spoke to patient this morning, easily awoken from sleep but still somnolent and quickly went back to sleep after examination. He has no acute complaints at this time. He denies fevers, chills, chest pain, shortness of breath, headache, visual disturbances, dizziness. Patient understands the plan to continue to stay in the hospital for tele monitoring.   Obtained verbal consent from the patient to speak to his mother Chantal and disclose health information. Called her 08/03 @5:20pm. Informed her of the patient's condition, recent labs, and imaging. She states that he has been B12 deficient in the past and used to go for regular injections. She expressed desire to have the patient go to rehab service directly from the hospital so it's covered by insurance. She wished to speak to  regarding this topic. I told her they will likely contact her tomorrow.     REVIEW OF SYSTEMS:  CONSTITUTIONAL: (-) weakness, (-) fevers, (-) chills  EYES/ENT: (-) visual changes,  (-) vertigo,  (-) throat pain   NECK:  (-) pain, (-) stiffness  RESPIRATORY:  (-) shortness of breath, (-) cough,  (-) wheezing,  (-) hemoptysis   CARDIOVASCULAR:  (-) chest pain, (-) palpitations  GASTROINTESTINAL:  (-) abdominal or epigastric pain, (-) nausea, (-) vomiting, (-) diarrhea, (-) constipation, (-) melena,  (-) hematemesis,  (-) hematochezia  GENITOURINARY: (-) dysuria, (-) frequency, (-) hematuria  NEUROLOGICAL: (-) numbness, (-) weakness  SKIN: (-) itching, (-) rashes, (-) lesions    PHYSICAL EXAM:  Vital Signs Last 24 Hrs  T(C): 37.2 (03 Aug 2021 09:01), Max: 38.1 (03 Aug 2021 04:45)  T(F): 98.9 (03 Aug 2021 09:01), Max: 100.5 (03 Aug 2021 04:45)  HR: 75 (03 Aug 2021 09:01) (75 - 92)  BP: 125/71 (03 Aug 2021 09:01) (108/78 - 128/81)  BP(mean): 88 (03 Aug 2021 06:22) (81 - 92)  RR: 18 (03 Aug 2021 09:01) (14 - 26)  SpO2: 98% (03 Aug 2021 09:01) (85% - 100%)    PHYSICAL EXAM:  GENERAL: NAD, lying in bed comfortably. Awoke patient from sleep this morning, but somnolent.   HEAD:  Atraumatic, Normocephalic  EYES: EOMI, PERRLA, conjunctiva and sclera clear.  ENT: Moist mucous membranes  NECK: Supple,  CHEST/LUNG: Clear to auscultation bilaterally, good air entry bilaterally; No wheezing, rales, or rhonchi. Unlabored respirations  HEART: Regular rate and rhythm. S1 and S2. No murmurs, rubs, or gallops  ABDOMEN: Soft, Nontender, Nondistended. Bowel sounds present x4 quadrants; No hepatomegaly. No splenomegaly.  EXTREMITIES:  2+ Peripheral Pulses. Capillary refill <2 seconds. No clubbing, cyanosis, or edema  NERVOUS SYSTEM:  Alert & Oriented X3, speech clear. Cranial nerves II-XII intact, no focal neurologic deficits. Sensation to light touch equal on bilateral upper and lower extremities.   MSK: FROM all 4 extremities, full and equal strength 5/5  SKIN: No rashes, bruises, or other lesions    MEDICATIONS:  MEDICATIONS  (STANDING):  enoxaparin Injectable 40 milliGRAM(s) SubCutaneous daily  nicotine - 21 mG/24Hr(s) Patch 1 Patch Transdermal daily      LABS: All Labs Reviewed:                        13.6   10.23 )-----------( 275      ( 03 Aug 2021 05:44 )             45.1     08-03    141  |  106  |  14  ----------------------------<  112<H>  4.2   |  34<H>  |  0.84    Ca    8.3<L>      03 Aug 2021 05:44    TPro  7.6  /  Alb  3.6  /  TBili  0.4  /  DBili  x   /  AST  19  /  ALT  46  /  AlkPhos  109  08-02      CARDIAC MARKERS ( 02 Aug 2021 13:31 )  <0.015 ng/mL / x     / x     / x     / x          Blood Culture:   I&O's Summary    03 Aug 2021 07:01  -  03 Aug 2021 16:07  --------------------------------------------------------  IN: 0 mL / OUT: 1000 mL / NET: -1000 mL      CAPILLARY BLOOD GLUCOSE          RADIOLOGY/EKG:  < from: Xray Chest 1 View- PORTABLE-Urgent (Xray Chest 1 View- PORTABLE-Urgent .) (08.03.21 @ 06:14) >  IMPRESSION:   Subtle airspace disease RIGHT upper lobe abutting minor fissure. Asymmetric haziness  LEFT lung base concerning for LEFT effusion layering along posterior thoracic wall.  < end of copied text >      < from: CT Angio Neck w/ IV Cont (08.02.21 @ 00:00) >  IMPRESSION:  CT Head: No acute intracranial hemorrhage or mass effect.  CTA Neck: No hemodynamically significant stenosis, dissection, or pseudoaneurysm.  CTA Head: No major vascular occlusion, significant stenosis, dissection, or saccular aneurysm.  < end of copied text >             40M with h/o COPD, smoker, obesity, presented to the ED after being found unresponsive by his father around 8:30 on 08/02. He was unsure of how long the patient was unresponsive; however, the patient states it was a few hours. As per the patient, he puffed 50 cartridges of nitric oxide today which he purchased from a smoke shop in [place] in order to get high. He reports typically smoking 4-5 cartridges of whippets a few times a month, but did more this time because he "felt bored and had nothing else to do". He denies any anxiety, depression, suicidal ideation/intent/plan, social issues at home. Reported feeling fatigued and having difficulty with word finding. Denied urinating on himself, biting his tongue, seizure like activity, motor deficits.     ED event: Patient was planned to be discharged from the ED when he slid off the stretcher and onto the floor, no head trauma, no LOC. Patient reported feeling of unsteadiness, noted to be unsafe for discharge at the time. Patient admitted to the floor with tele monitoring.     08/03: Spoke to patient this morning, easily awoken from sleep but still somnolent and quickly went back to sleep after examination. He has no acute complaints at this time. He denies fevers, chills, chest pain, shortness of breath, headache, visual disturbances, dizziness. Patient understands the plan to continue to stay in the hospital for tele monitoring.   In chart, noted that patient's mother wished to be updated on patient's condition. Obtained verbal consent from the patient to speak to his mother Chantal and disclose his health information. Called her 08/03 @5:20pm. Informed her of the patient's condition, recent labs, and imaging. She states that the patient has been B12 deficient in the past and used to go for regular injections. She expressed desire to have the patient go to rehab service directly from the hospital so it's covered by insurance. She wished to speak to  regarding this topic. I told her they will likely contact her tomorrow. Patient's PCP is Dr. Ashish Carranza and his Neurologist is Dr. Josse Donaldson.    REVIEW OF SYSTEMS:  CONSTITUTIONAL: (-) weakness, (-) fevers, (-) chills  EYES/ENT: (-) visual changes,  (-) vertigo,  (-) throat pain   NECK:  (-) pain, (-) stiffness  RESPIRATORY:  (-) shortness of breath, (-) cough,  (-) wheezing,  (-) hemoptysis   CARDIOVASCULAR:  (-) chest pain, (-) palpitations  GASTROINTESTINAL:  (-) abdominal or epigastric pain, (-) nausea, (-) vomiting, (-) diarrhea, (-) constipation, (-) melena,  (-) hematemesis,  (-) hematochezia  GENITOURINARY: (-) dysuria, (-) frequency, (-) hematuria  NEUROLOGICAL: (-) numbness, (-) weakness  SKIN: (-) itching, (-) rashes, (-) lesions    PHYSICAL EXAM:  Vital Signs Last 24 Hrs  T(C): 37.2 (03 Aug 2021 09:01), Max: 38.1 (03 Aug 2021 04:45)  T(F): 98.9 (03 Aug 2021 09:01), Max: 100.5 (03 Aug 2021 04:45)  HR: 75 (03 Aug 2021 09:01) (75 - 92)  BP: 125/71 (03 Aug 2021 09:01) (108/78 - 128/81)  BP(mean): 88 (03 Aug 2021 06:22) (81 - 92)  RR: 18 (03 Aug 2021 09:01) (14 - 26)  SpO2: 98% (03 Aug 2021 09:01) (85% - 100%)    PHYSICAL EXAM:  GENERAL: NAD, lying in bed comfortably. Awoke patient from sleep this morning, but somnolent.   HEAD:  Atraumatic, Normocephalic  EYES: EOMI, PERRLA, conjunctiva and sclera clear.  ENT: Moist mucous membranes  NECK: Supple,  CHEST/LUNG: Clear to auscultation bilaterally, good air entry bilaterally; No wheezing, rales, or rhonchi. Unlabored respirations  HEART: Regular rate and rhythm. S1 and S2. No murmurs, rubs, or gallops  ABDOMEN: Soft, Nontender, Nondistended. Bowel sounds present x4 quadrants; No hepatomegaly. No splenomegaly.  EXTREMITIES:  2+ Peripheral Pulses. Capillary refill <2 seconds. No clubbing, cyanosis, or edema  NERVOUS SYSTEM:  Alert & Oriented X3, speech clear. Cranial nerves II-XII intact, no focal neurologic deficits. Sensation to light touch equal on bilateral upper and lower extremities.   MSK: FROM all 4 extremities, full and equal strength 5/5  SKIN: No rashes, bruises, or other lesions    MEDICATIONS:  MEDICATIONS  (STANDING):  enoxaparin Injectable 40 milliGRAM(s) SubCutaneous daily  nicotine - 21 mG/24Hr(s) Patch 1 Patch Transdermal daily      LABS: All Labs Reviewed:                        13.6   10.23 )-----------( 275      ( 03 Aug 2021 05:44 )             45.1     08-03    141  |  106  |  14  ----------------------------<  112<H>  4.2   |  34<H>  |  0.84    Ca    8.3<L>      03 Aug 2021 05:44    TPro  7.6  /  Alb  3.6  /  TBili  0.4  /  DBili  x   /  AST  19  /  ALT  46  /  AlkPhos  109  08-02      CARDIAC MARKERS ( 02 Aug 2021 13:31 )  <0.015 ng/mL / x     / x     / x     / x          Blood Culture:   I&O's Summary    03 Aug 2021 07:01  -  03 Aug 2021 16:07  --------------------------------------------------------  IN: 0 mL / OUT: 1000 mL / NET: -1000 mL      CAPILLARY BLOOD GLUCOSE          RADIOLOGY/EKG:  < from: Xray Chest 1 View- PORTABLE-Urgent (Xray Chest 1 View- PORTABLE-Urgent .) (08.03.21 @ 06:14) >  IMPRESSION:   Subtle airspace disease RIGHT upper lobe abutting minor fissure. Asymmetric haziness  LEFT lung base concerning for LEFT effusion layering along posterior thoracic wall.  < end of copied text >      < from: CT Angio Neck w/ IV Cont (08.02.21 @ 00:00) >  IMPRESSION:  CT Head: No acute intracranial hemorrhage or mass effect.  CTA Neck: No hemodynamically significant stenosis, dissection, or pseudoaneurysm.  CTA Head: No major vascular occlusion, significant stenosis, dissection, or saccular aneurysm.  < end of copied text >

## 2021-08-03 NOTE — ED ADULT NURSE REASSESSMENT NOTE - NS ED NURSE REASSESS COMMENT FT1
pt is resting in bed comfortably at this time. VSS, pt updated regarding plan of care, awaiting bed placement.

## 2021-08-03 NOTE — PROGRESS NOTE ADULT - ASSESSMENT
40M with h/o COPD, bronchitis, nicotine abuse, obesity, presented to the ED after being found unresponsive by his father for several hours after inhaling 50 cartridges of nitric oxide. Patient was unsteady in the ED and slid off his stretcher, no head trauma witnessed, no LOC. Patient is currently admitted for:     # Acute metabolic encephalopathy secondary to nitric oxide poisoning   - ED vitals wnl, SpO2 95% on RA, placed on NC.   - 1L NS given in the ED  - As per Nay Salguero from Poison control, mostly supportive care - cardiac monitoring, neuro checks, monitor respiratory rate and SpO2   - On telemetry, no events overnight.   - Utox, acetaminophen level, salicylate level, alcohol level, all negative.   - Patient afebrile, WBC 12.5 -> 10 (08/03)   - Patient tolerating regular diet, no evidence of aspiration  - SBIRT recommend rehab  - PT evaluation placed   - Case management/social work following       # Ataxia likely secondary to nitric oxide poisoning   - Slid off stretcher in ED, no head trauma, no LOC  - CTA head and neck: no acute pathology  - Unsteadiness likely 2/2 disorientation from nitric oxide use    # History of COPD, bronchitis, nicotine abuse   - Patient history of COPD  - CXR: Subtle airspace disease RIGHT upper lobe abutting minor fissure. Asymmetric haziness  LEFT lung base concerning for LEFT effusion layering along posterior thoracic wall.  - VBG showed some CO2 retention at 65mmHg  - SpO2 98% on 3L NC  - Will order nicotine patch  - No evidence of aspiration PNA, continue to monitor respiratory status    # Elevated MCV   - s/p 1000 mcg of B12 in the ED  - .2  - Folate level >20   - Vitamin B12 level 321  - Methylmalonic level pending      # Hypocalcemia   - Ca 8.2 -> 8.3 (08/03)   - Albumin 3.6  - Correct calcium: 8.6 mg/dL  - Continue to monitor, no need for supplementation at this time     # DVTppx  Lovenox 40 mg subcutaneous daily     # Code Status  Patient is full code    Patient's Mother - Chantal Veloz [phone] with updates   40M with h/o COPD, bronchitis, nicotine abuse, obesity, presented to the ED after being found unresponsive by his father for several hours after inhaling 50 cartridges of nitric oxide. Patient was unsteady in the ED and slid off his stretcher, no head trauma witnessed, no LOC. Patient is currently admitted for:     # Acute metabolic encephalopathy secondary to nitric oxide poisoning   - ED vitals wnl, SpO2 95% on RA, placed on NC.   - 1L NS given in the ED  - As per Nay Salguero from Poison control, mostly supportive care - cardiac monitoring, neuro checks, monitor respiratory rate and SpO2   - On telemetry, no events overnight.   - Utox, acetaminophen level, salicylate level, alcohol level, all negative.   - Patient afebrile, WBC 12.5 -> 10 (08/03)   - Patient tolerating regular diet, no evidence of aspiration  - SBIRT recommend rehab  - PT evaluation placed   - Case management/social work following       # Ataxia likely secondary to nitric oxide poisoning   - Slid off stretcher in ED, no head trauma, no LOC  - CTA head and neck: no acute pathology  - Unsteadiness likely 2/2 disorientation from nitric oxide use    # History of COPD, bronchitis, nicotine abuse   - Patient history of COPD  - CXR: Subtle airspace disease RIGHT upper lobe abutting minor fissure. Asymmetric haziness  LEFT lung base concerning for LEFT effusion layering along posterior thoracic wall.  - VBG showed some CO2 retention at 65mmHg  - SpO2 98% on 3L NC  - Will order nicotine patch  - No evidence of aspiration PNA, continue to monitor respiratory status    # Elevated MCV   - s/p 1000 mcg of B12 in the ED  - .2  - Folate level >20   - Vitamin B12 level 321  - Methylmalonic level pending      # Hypocalcemia   - Ca 8.2 -> 8.3 (08/03)   - Albumin 3.6  - Correct calcium: 8.6 mg/dL  - Continue to monitor, no need for supplementation at this time     # DVTppx  Lovenox 40 mg subcutaneous daily     # Code Status  Patient is full code    Patient's Mother - Chantal [phone] with updates. Wishes to speak with / regarding rehab placement at discharge.

## 2021-08-03 NOTE — ED ADULT NURSE REASSESSMENT NOTE - NS ED NURSE REASSESS COMMENT FT1
Pt states he is hungry and wants to eat. NPO status and aspiration precaution reviewed with pt. Advised pt to wait on CXR result. Pt demonstrated understanding through teach back. Pt more alert now.

## 2021-08-03 NOTE — PROGRESS NOTE ADULT - ASSESSMENT
Pt has been seen and examined with FP resident, resident supervised agree with a/p       Patient is a 40y old  Male who presents with a chief complaint of Unresponsive at home (02 Aug 2021 20:26)      HPI:  39 y/o M presented after being found unresponsive by his father around 8:30 am the day of admission. Unsure of how long the patient was unresponsive; however, the pt states it was a few hours.    PHYSICAL EXAM:  Vital Signs Last 24 Hrs  T(C): 37.2 (03 Aug 2021 09:01), Max: 38.1 (03 Aug 2021 04:45)  T(F): 98.9 (03 Aug 2021 09:01), Max: 100.5 (03 Aug 2021 04:45)  HR: 75 (03 Aug 2021 09:01) (75 - 101)  BP: 125/71 (03 Aug 2021 09:01) (108/78 - 142/84)  BP(mean): 88 (03 Aug 2021 06:22) (81 - 92)  RR: 18 (03 Aug 2021 09:01) (14 - 26)  SpO2: 98% (03 Aug 2021 09:01) (85% - 100%)  -rs-aeeb, cta  -cvs-s1s2 normal   -p/a-soft,bs+      A/P      #ct tele monitoring     #supportive care

## 2021-08-04 ENCOUNTER — TRANSCRIPTION ENCOUNTER (OUTPATIENT)
Age: 41
End: 2021-08-04

## 2021-08-04 VITALS
RESPIRATION RATE: 18 BRPM | SYSTOLIC BLOOD PRESSURE: 107 MMHG | TEMPERATURE: 98 F | OXYGEN SATURATION: 95 % | DIASTOLIC BLOOD PRESSURE: 66 MMHG | HEART RATE: 72 BPM

## 2021-08-04 LAB
AMPHET UR-MCNC: NEGATIVE — SIGNIFICANT CHANGE UP
BARBITURATES UR SCN-MCNC: NEGATIVE — SIGNIFICANT CHANGE UP
BENZODIAZ UR-MCNC: POSITIVE — SIGNIFICANT CHANGE UP
COCAINE METAB.OTHER UR-MCNC: NEGATIVE — SIGNIFICANT CHANGE UP
METHADONE UR-MCNC: NEGATIVE — SIGNIFICANT CHANGE UP
OPIATES UR-MCNC: NEGATIVE — SIGNIFICANT CHANGE UP
PCP SPEC-MCNC: SIGNIFICANT CHANGE UP
PCP UR-MCNC: NEGATIVE — SIGNIFICANT CHANGE UP
THC UR QL: POSITIVE — SIGNIFICANT CHANGE UP

## 2021-08-04 PROCEDURE — 99239 HOSP IP/OBS DSCHRG MGMT >30: CPT | Mod: GC

## 2021-08-04 RX ADMIN — Medication 1 PATCH: at 07:20

## 2021-08-04 RX ADMIN — ENOXAPARIN SODIUM 40 MILLIGRAM(S): 100 INJECTION SUBCUTANEOUS at 09:19

## 2021-08-04 RX ADMIN — Medication 1 PATCH: at 09:19

## 2021-08-04 NOTE — PHYSICAL THERAPY INITIAL EVALUATION ADULT - GENERAL OBSERVATIONS, REHAB EVAL
pt sitting up in bed initially cordial ,later impatient and wants to leave hospital, becoming irritable, visibly diaphoretic ; ,he is overweight/obese with large bloated abdomen,skinnt apperaing legs out of proportion to body habitus  ,awake,alert,Ox3 ,upset & thinks he has lost his wallet ,explained to patient it is likely home as he was brought in unresponsive ,security contacted by unit seceretary

## 2021-08-04 NOTE — DISCHARGE NOTE PROVIDER - CARE PROVIDER_API CALL
GORGE HOWARD  [id number]  [address]  [place] [address]  Phone: [phone]  Fax: [phone]  Established Patient  Follow Up Time: 1 week

## 2021-08-04 NOTE — PHYSICAL THERAPY INITIAL EVALUATION ADULT - IMPAIRMENTS FOUND, PT EVAL
pt inattentive at times,focused on leaving hospital,urged pt to use the RW as measure of fall prevention ,avoid readmission/aerobic capacity/endurance/arousal, attention, and cognition/gait, locomotion, and balance/muscle strength/sensory integrity

## 2021-08-04 NOTE — DISCHARGE NOTE NURSING/CASE MANAGEMENT/SOCIAL WORK - NSSBIRTDRGACTIVEREFTXDET_GEN_A_CORE
Provided SBIRT services: Full screen positive. Referral to Treatment Performed. Screening results were reviewed with the patient and patient was provided information about healthy guidelines and potential negative consequences associated with level of risk. Motivation and readiness to reduce or stop use was discussed and goals and activities to make changes were suggested/offered.  Referral for complete assessment and level of care determination at a certified treatment facility was completed by contacting the treatment facility via phone.  Offered information, patient signed consent for: [facility], case management service; PC will provide treatment and recovery support for a minimum of 120 days.

## 2021-08-04 NOTE — DISCHARGE NOTE NURSING/CASE MANAGEMENT/SOCIAL WORK - NSDCPEEMAIL_GEN_ALL_CORE
[facility] for Tobacco Control email [email or web address]

## 2021-08-04 NOTE — DISCHARGE NOTE PROVIDER - NSDCCPCAREPLAN_GEN_ALL_CORE_FT
PRINCIPAL DISCHARGE DIAGNOSIS  Diagnosis: Nitrous oxide overdose  Assessment and Plan of Treatment: You were brought into the emergency department due to oversode of nitrous oxide. Dangerously high doses of nitrous oxide in your body can causes permanent neurologic damage, life threatening cardiac arrhythmias as well as possibly coma. For this reason, we urge you to consider programs to aid in quitting this extremely risky habit. If you start experiencing acute confusion, nerve damage including paralysis or chest pain, please present to ED.

## 2021-08-04 NOTE — PHYSICAL THERAPY INITIAL EVALUATION ADULT - PERTINENT HX OF CURRENT PROBLEM, REHAB EVAL
pt found unresponsive by his father ,he had smoked 50 nitrous oxide cartridges "out of boredom" ,pt was evaluated in ED and was to be discharged but slid off stretcher onto floor with +gait instability

## 2021-08-04 NOTE — PHYSICAL THERAPY INITIAL EVALUATION ADULT - DIAGNOSIS, PT EVAL
substance abuse disorder, gait instability substance abuse disorder, neuroapthy BLEs (L>R) ,BLE weakness ,+ gait instability,COPD/bronchitis

## 2021-08-04 NOTE — PHYSICAL THERAPY INITIAL EVALUATION ADULT - FOLLOWS COMMANDS/ANSWERS QUESTIONS, REHAB EVAL
at times needed coaxing to elicit attention/cooperation ,pt irritable ,and impatient/wants to leave ,reportedly refused inpatient substance abuse rehab/100% of the time

## 2021-08-04 NOTE — DISCHARGE NOTE PROVIDER - NSDCACTIVITY_GEN_ALL_CORE
Bathing allowed/Sex allowed/Showering allowed/Stairs allowed/Walking - Indoors allowed/No heavy lifting/straining/Walking - Outdoors allowed

## 2021-08-04 NOTE — DISCHARGE NOTE PROVIDER - HOSPITAL COURSE
39 yo M active smoker w PMH of COPD & ADHD on Adderall brought into  ED on 08/02 due to being found unresponsive by his father. When pt came to in ED, admitted to smoking 50 cartridges of Nitric oxide, denies SI, states he was bored. Pt was A&O x 3 amd was going to be D/C from ED, however, pt became unsteady and was admitted for further evaluation on tele. No cardiac events while tele monitoring, no significant changes on EKG when compared to EKG from previous year. Vitals remained stable, patient currently breathing comfortably on room air, afebrile. Poison control was contacted and per recommendations, supportive care for nitrous oxide poisoning during hospitalization. Patient currently medically stable for D/C, A&O x 4, interested in inpatient rehabilitation which is awaiting placement.     Vitals  T(F): 97.7 (08-04-21 @ 07:36), Max: 98.5 (08-03-21 @ 20:52)  HR: 72 (08-04-21 @ 07:36) (72 - 80)  BP: 107/66 (08-04-21 @ 07:36) (101/49 - 107/66)  RR: 18 (08-04-21 @ 07:36) (18 - 18)  SpO2: 95% (08-04-21 @ 07:36) (93% - 95%)    Physical Exam   Gen: NAD, comfortable  HENT: atraumatic head and ears, no gross abnormalities of ears, mucous membranes moist, no oral lesions, neck supple without masses/goiter/lymphadenopathy  CV: RRR, nl s1/s2, no M/R/G  Pulm: nl respiratory effort, CTAB, no wheezes/crackles/rhonchi  Abd: normoactive bowel sounds in all 4 quadrants, soft, nontender, nondistended, no rebound, no guarding, no masses  Extremities: no pedal edema, pedal pulses palpable   Skin: nl warm and dry, no wounds   Neuro: A&Ox3, answering questions appropriately

## 2021-08-04 NOTE — DISCHARGE NOTE NURSING/CASE MANAGEMENT/SOCIAL WORK - NSDCPEWEB_GEN_ALL_CORE
[facility] for Tobacco Control website --- [email or web address] --- [email or web address]

## 2021-08-04 NOTE — PHYSICAL THERAPY INITIAL EVALUATION ADULT - PRECAUTIONS/LIMITATIONS, REHAB EVAL
fall precautions pt with unsteady gait ,decreased strength B quads,decreased sensation bilaterally/fall precautions

## 2021-08-04 NOTE — PHYSICAL THERAPY INITIAL EVALUATION ADULT - PHYSICAL ASSIST/NONPHYSICAL ASSIST: STAND/SIT, REHAB EVAL
safety cues to approximate chair ,reach down for armrests/supervision/verbal cues/nonverbal cues (demo/gestures)/1 person assist

## 2021-08-04 NOTE — DISCHARGE NOTE NURSING/CASE MANAGEMENT/SOCIAL WORK - PATIENT PORTAL LINK FT
You can access the FollowMyHealth Patient Portal offered by [facility] by registering at the following website: [email or web address]. By joining [employer]’s FollowMyHealth portal, you will also be able to view your health information using other applications (apps) compatible with our system.

## 2021-08-04 NOTE — PHYSICAL THERAPY INITIAL EVALUATION ADULT - IMPAIRMENTS CONTRIBUTING TO GAIT DEVIATIONS, PT EVAL
impaired balance/impaired coordination/impaired motor control/abnormal muscle tone/decreased sensation/decreased strength

## 2021-08-04 NOTE — PROGRESS NOTE ADULT - ASSESSMENT
Pt has been seen and examined with FP resident, resident supervised agree with a/p       Patient is a 40y old  Male who presents with a chief complaint of Unresponsive at home (02 Aug 2021 20:26)      HPI:  39 y/o M presented after being found unresponsive by his father around 8:30 am the day of admission. Unsure of how long the patient was unresponsive;     PHYSICAL EXAM:  -rs-aeeb, cta  -cvs-s1s2 normal   -p/a-soft,bs+      A/P    #Nitrous oxide overdose and poisoning - resolved   -counselled pt not to use any illicit substance and drug, pt understood me, all questions have been answered     #d/c today with further mangement as an outpt, time spent 45 minutes

## 2021-08-05 LAB
CULTURE RESULTS: NO GROWTH — SIGNIFICANT CHANGE UP
SPECIMEN SOURCE: SIGNIFICANT CHANGE UP

## 2021-08-09 DIAGNOSIS — E66.9 OBESITY, UNSPECIFIED: ICD-10-CM

## 2021-08-09 DIAGNOSIS — R27.0 ATAXIA, UNSPECIFIED: ICD-10-CM

## 2021-08-09 DIAGNOSIS — G93.41 METABOLIC ENCEPHALOPATHY: ICD-10-CM

## 2021-08-09 DIAGNOSIS — T59.0X1A: ICD-10-CM

## 2021-08-09 DIAGNOSIS — Z79.52 LONG TERM (CURRENT) USE OF SYSTEMIC STEROIDS: ICD-10-CM

## 2021-08-09 DIAGNOSIS — F12.90 CANNABIS USE, UNSPECIFIED, UNCOMPLICATED: ICD-10-CM

## 2021-08-09 DIAGNOSIS — R33.9 RETENTION OF URINE, UNSPECIFIED: ICD-10-CM

## 2021-08-09 DIAGNOSIS — E83.51 HYPOCALCEMIA: ICD-10-CM

## 2021-08-09 DIAGNOSIS — F17.210 NICOTINE DEPENDENCE, CIGARETTES, UNCOMPLICATED: ICD-10-CM

## 2021-08-09 DIAGNOSIS — J44.9 CHRONIC OBSTRUCTIVE PULMONARY DISEASE, UNSPECIFIED: ICD-10-CM

## 2021-08-09 DIAGNOSIS — F90.9 ATTENTION-DEFICIT HYPERACTIVITY DISORDER, UNSPECIFIED TYPE: ICD-10-CM

## 2021-08-09 DIAGNOSIS — F19.10 OTHER PSYCHOACTIVE SUBSTANCE ABUSE, UNCOMPLICATED: ICD-10-CM

## 2021-10-12 NOTE — PATIENT PROFILE ADULT - NSPRESCRALCSCORE_GEN_A_NUR_CAL
PHYSICIAN DISCHARGE SUMMARY    ADMISSION DATE:  10/1/2021  DISCHARGE DATE:  10/12/2021  DISCHARGING PHYSICIAN:  Lluvia Prescott MD  ATTENDING PHYSICIAN:  Lluvia Prescott MD    DISCHARGE DIAGNOSIS:     Patient Active Problem List   Diagnosis   • Diffuse large B-cell lymphoma of lymph nodes of multiple regions (CMS/HCC)   • Anemia   • Benign colonic polyp   • Benign neoplasm of transverse colon   • Diverticulosis of colon   • Elevated PSA   • Encounter for screening for malignant neoplasm of prostate   • Essential hypertension   • Internal hemorrhoids   • Morbid obesity (CMS/HCC)   • Multiple acquired skin tags   • Nasal congestion   • Need for influenza vaccination   • Need for pneumococcal vaccination   • Obesity   • Osteoarthritis   • Proptosis   • Advance directive discussed with patient   • Dyspnea on exertion   • Acute CHF (CMS/HCC)   • Hypokalemia   • Systolic and diastolic CHF, acute (CMS/HCC)   • Acute kidney injury (CMS/HCC)   • CAD (coronary artery disease)        OTHER DIAGNOSES:    Past Medical History:   Diagnosis Date   • Advance directive in chart 01/26/2018   • Anemia    • Elevated PSA    • Floaters    • HTN (hypertension)    • Large B-cell lymphoma (CMS/HCC)    • Mass    • Obesity    • Osteoarthritis    • Radicular cyst    • Sialoadenitis of submandibular gland        DISCHARGE DISPOSITION:    home    CONDITION AT DISCHARGE:    good    DISCHARGE INSTRUCTIONS:    DISCHARGE MEDICATIONS:  See Discharge Medication Reconciliation List  FOLLOW-UP:  PCP in 1-2 weeks, fu with cardiology in 1-2 weeks  DIET:  cardiac diet  ACTIVITY:  activity as tolerated  PENDING RESULTS: none    HOSPITAL COURSE:  Camacho Little is a 73 year old male who was admitted on 10/1/2021 with Congestive heart failure exacerbation and obstrucive CAD    New onset decompensated acute systolic and diastolic CHF exacerbation   Hypertensive heart disease  Obstructive CAD  Echo (10/2): EF of 25% with global hypokinesis  Cardiology recs are  appreciated             - s/p lasix and milrinone GTT; switch to PO lasix on discharge             - added spironolactone             - monitor BMP closely  S/p cardiac cath (10/8):  Severe stenosis of the mid left anterior descending coronary artery successful angioplasty assisted stenting  Continue ASA, brilanta; dual platelet therapy for 1 year  Continue Coreg, clonidine, Cozaar  Will need close outpatient cardiology follow up  Home with cardiac life Vest     Acute kidney injury:  Acute urinary retention  Retention resolved with damian placement  Nephrology recs are appreciated  Creatinine stabilized     Hypokalemia  Hypomagnsemia  Replete and monitor     History of B-cell lymphoma status post chemotherapy: Follow-up with oncology outpatient.       OBJECTIVE:    VITALS:    Patient Vitals for the past 24 hrs:   BP Temp Temp src Pulse Resp SpO2   10/12/21 0829 125/70 98.1 °F (36.7 °C) Oral 89 20 95 %   10/12/21 0301 132/87 97.5 °F (36.4 °C) Oral 80 18 95 %   10/11/21 2255 123/75 98.1 °F (36.7 °C) Oral 83 18 97 %   10/11/21 2009 119/80 98.2 °F (36.8 °C) Oral 86 18 96 %   10/11/21 1544 103/62 97.9 °F (36.6 °C) Oral (!) 58 20 95 %   10/11/21 1203 115/80 97.7 °F (36.5 °C) Oral (!) 101 20 95 %         PHYSICAL EXAM:    Constitutional:  The patient is alert, oriented and cooperative.   Integument:  Warm. Dry. No erythema. No rash.    HENT:  Normocephalic. Atraumatic. Bilateral external ears normal.   Neck: Normal range of motion. No tenderness. Supple. No stridor.    Cardiovascular:  Normal heart rate. Normal rhythm. No murmurs. No rubs. No gallops.    Respiratory:  Normal breath sounds. No respiratory distress. No wheezing. No chest tenderness.    CONSULTS:    Cardiology    PROCEDURES:    none    DISHCARGE MEDICATION LIST:       Summary of your Discharge Medications      Take these Medications      Details   aspirin 81 MG EC tablet   Take 1 tablet by mouth daily. Do not start before October 12, 2021.     atorvastatin 40  MG tablet  Commonly known as: LIPITOR   Take 1 tablet by mouth nightly.     cloNIDine 0.1 MG tablet  Commonly known as: CATAPRES   TAKE 1 TABLET BY MOUTH THREE TIMES A DAY     furosemide 40 MG tablet  Commonly known as: LASIX  Start taking on: October 13, 2021   Take 1 tablet by mouth daily. Do not start before October 13, 2021.     metoPROLOL succinate 100 MG 24 hr tablet  Commonly known as: TOPROL-XL  Start taking on: October 13, 2021   Take 1 tablet by mouth daily. Do not start before October 13, 2021.     sacubitril-valsartan 24-26 MG per tablet  Commonly known as: ENTRESTO   Take 1 tablet by mouth 2 times daily.     tamsulosin 0.4 MG Cap  Commonly known as: FLOMAX   Take 1 capsule by mouth daily after a meal. Do not start before October 12, 2021.     ticagrelor 90 MG Tab  Commonly known as: BRILINTA   Take 1 tablet by mouth every 12 hours.             SIGNIFICANT DIAGNOSTIC STUDIES:    XR CHEST PA OR AP 1 VIEW    Result Date: 10/1/2021  Narrative: EXAM: XR CHEST PA OR AP 1 VIEW CLINICAL INDICATION: Shortness of breath. COMPARISON: Chest x-ray 10/01/2021.     Impression: FINDINGS/IMPRESSION: Persistent shallow inspiratory effort.  Mild cardiomegaly, mild central pulmonary vascular distention and the mediastinal contour remains stable. There is linear atelectasis left lung base; otherwise lungs are clear.  No evidence of significant pleural effusions or pneumothorax.  Mild mural calcification thoracic aorta compatible with atherosclerosis. Overlying EKG leads. Electronically Signed by: MONA BERGER M.D. Signed on: 10/1/2021 2:34 PM     XR CHEST PA AND LATERAL 2 VIEWS    Result Date: 10/1/2021  Narrative: EXAM: Chest AP and lateral 10/01/2021 CLINICAL INDICATION: Chest pain.  Shortness of breath. COMPARISON: 02/22/2021. AP and lateral views of chest are submitted. FINDINGS: There is relatively poor inspiration. Cardiac silhouette and pulmonary vasculature appear within normal limits. Probable subsegmental  atelectasis/scarring in bilateral lung bases appear relatively stable.  Lungs appear otherwise clear of pneumonic infiltrate. No significant pleural effusion or pneumothorax is seen.  Mildly tortuous thoracic aorta and degenerative changes of thoracic spine appear relatively stable.     Impression: Accounting for technical differences, relatively stable cardiopulmonary findings compared to 2021 study.  Recommend followup as clinically warranted. Electronically Signed by: SHANAE ADLER M.D. Signed on: 10/1/2021 1:31 PM     TRANSTHORACIC ECHO (TTE) COMPLETE W/ W/O IMAGING AGENT    Impression: *[facility]* [address] [place] [id number] [phone] Transthoracic Echocardiogram (TTE) Patient: Camacho Little    Study Date/Time:     Oct 2 2021 10:36AM MRN:     [id number]             FIN#:                [id number] :     1948          Ht/Wt:               170cm 144.7kg Age:     73                  BSA/BMI:             2.46m^2 50.1kg/m^2 Gender:  M                   Baseline BP:         144 / 87 Ordering Physician:     Quang Pereira  Referring Physician:    Quang Pereira  Attending Physician:    Shirlene Johnson  Diagnostic Physician:   Jamin Wright MD Sonographer:            Jacquelyn Baptiste  -------------------------------------------------------------------------- INDICATIONS:   Impaired perfusion, hearft failure suspected.  Shortness of breath. -------------------------------------------------------------------------- STUDY CONCLUSIONS SUMMARY: 1. Left ventricle: The cavity size is mildly dilated. The ejection    fraction was measured by visual estimation. Severe global hypokinesis.    Doppler parameters are consistent with abnormal left ventricular    relaxation (grade 1 diastolic dysfunction). The ejection fraction is    25%. 2. Left atrium: The atrium is moderately dilated. -------------------------------------------------------------------------- STUDY DATA:   Procedure:   Transthoracic echocardiography was performed. Image quality was adequate.  M-mode, complete 2D, complete spectral Doppler, and color Doppler.  Study status:  Routine.  Study completion: There were no complications. FINDINGS LEFT VENTRICLE:  The cavity size is mildly dilated. Systolic function is severely reduced.  Severe global hypokinesis.    The ejection fraction was measured by visual estimation. The ejection fraction is 25%. The tissue Doppler parameters are abnormal. Doppler parameters are consistent with abnormal left ventricular relaxation (grade 1 diastolic dysfunction). AORTIC VALVE:  The annulus is mildly calcified. The valve is probably trileaflet. The leaflets are mildly calcified.  Doppler:   There is no stenosis.    No significant regurgitation.    The LVOT to aortic valve VTI ratio is 0.77. The valve area by the velocity-time integral method is 2.4cm^2. The valve area index by the velocity-time integral method is 0.99cm^2/m^2. The ratio of LVOT to aortic valve peak velocity is 0.93. The valve area by the peak velocity method is 2.9cm^2. The valve area index by the peak velocity method is 1.19cm^2/m^2.    The mean systolic gradient is 2mm Hg. The peak systolic gradient is 3mm Hg. AORTA:  Aortic root: The aortic root is normal in size. MITRAL VALVE:  The annulus is mildly calcified.  Doppler:  Transvalvular velocity is within the normal range. There is no evidence for stenosis. Mild regurgitation.    The valve area by pressure half-time is 4.3cm^2. The valve area index by pressure half-time is 1.75cm^2/m^2.    The peak diastolic gradient is 2mm Hg. LEFT ATRIUM:  The atrium is moderately dilated. RIGHT VENTRICLE:  Not well visualized. The cavity size is mildly dilated. PULMONIC VALVE:   Not well visualized.  Structurally normal valve. Doppler:  Transvalvular velocity is within the normal range. There is no evidence for stenosis.  No significant regurgitation. TRICUSPID VALVE:   Doppler:   Mild  regurgitation. RIGHT ATRIUM:  The atrium is mildly dilated. PERICARDIUM:  There is no pericardial effusion. SYSTEMIC VEINS: Inferior vena cava:  The respirophasic diameter changes are blunted (less than 50%). BASELINE ECG:   Normal sinus rhythm. -------------------------------------------------------------------------- Measurements  Left ventricle             Value        Ref        Left atrium continued        Value          Ref  VIR[place]IA, LAX chord             4.9   cm     4.2 - 5.8  Vol/bsa, S           (H)     42    ml/m^2   16 - 34  ESD, LAX chord             3.5   cm     2.5 - 4.0  Vol, ES, 1-p A4C     (H)     104   ml       18 - 58  [place]/bsa, LAX chord (L)     2.0   cm/m^2 2.2 - 3.0  Vol/bsa, ES, 1-p A4C (H)     42    ml/m^2   12 - 37  ESD/bsa, LAX chord         1.4   cm/m^2 1.3 - 2.1  PW, ED, LAX        (H)     1.3   cm     0.6 - 1.0  Aortic valve                 Value          Ref  PW, ED             (H)     1.1   cm     0.6 - 1.0  Peak v, S                    0.8   m/sec    -------  IVS/PW, ED                 1            ---------  Mean v, S                    0.68  m/sec    -------  EDV                        118   ml     62 - 150   Mean grad, S                 2     mm Hg    -------  ESV                        43    ml     21 - 61    Peak grad, S                 3     mm Hg    -------  EF                 (L)     25    %      52 - 72    LVOT/AV, VTI ratio           0.77           -------  SV                         62    ml     ---------  OTILIA, VTI                     2.4   cm^2     -------  EDV/bsa                    48    ml/m^2 34 - 74    OTILIA/bsa, VTI                 0.99  cm^2/m^2 -------  ESV/bsa                    17    ml/m^2 11 - 31    LVOT/AV, Vpeak ratio         0.93           -------  SV/bsa                     25    ml/m^2 ---------  OTILIA, Vmax                    2.9   cm^2     -------  E', lat sameera, TDI   (L)     5.66  cm/sec >=10       OTILIA/bsa, Vmax                1.19  cm^2/m^2 -------  E/e', lat  sameera, TDI         13           ---------  E', med sameera, TDI   (L)     3.59  cm/sec >=7        Mitral valve                 Value          Ref  E/e', med sameera, TDI         20           ---------  Peak E                       0.71  m/sec    -------  E', avg, TDI               4.625 cm/sec ---------  Peak A                       0.72  m/sec    -------  E/e', avg, TDI     (H)     15           <=14       Decel time                   173   ms       -------                                                     PHT                          51    ms       -------  LVOT                       Value        Ref        Peak grad, D                 2     mm Hg    -------  Diam, S                    2.0   cm     ---------  Peak E/A ratio               1              -------  Area                       3.1   cm^2   ---------  MVA, PHT                     4.3   cm^2     -------  Peak jazmyn, S                0.77  m/sec  ---------  MVA/bsa, PHT                 1.75  cm^2/m^2 -------  Peak grad, S               2     mm Hg  ---------  MR peak v                    2.98  m/sec    -------                                                     Peak LV-LA grad S            36    mm Hg    -------  Ventricular septum         Value        Ref        Max MR v                     2.98  m/sec    -------  IVS, ED            (H)     1.1   cm     0.6 - 1.0  Regurg VTI                   177.0 cm       -------   Right ventricle            Value        Ref        Tricuspid valve              Value          Ref  VIR[place]IA, LAX                   3.3   cm     ---------  TR peak v                    0.7   m/sec    <=2.8  TAPSE, MM                  2.0   cm     1.7 - 3.1  Peak RV-RA grad, S           2     mm Hg    -------  Pressure, S                17    mm Hg  ---------  Max TR jazmyn                   0.68  m/sec    -------  S' lateral                 7.94  cm/sec 6 - 13.4                                                     Pulmonary artery             Value          Ref   Left atrium                Value        Ref        Pressure, S                  17    mm Hg    -------  AP dim, ES         (H)     5.8   cm     3.0 - 4.0  AP dim index       (H)     2.4   cm/m^2 1.5 - 2.3  Systemic veins               Value          Ref  Area ES, A4C       (H)     29    cm^2   <=20       Estimated CVP                15    mm Hg    -------  Vol, S             (H)     104   ml     18 - 58 Legend: (L)  and  (H)  paul values outside specified reference range. Prepared and electronically signed by Jamin Wright MD 10/02/2021 16:10    CTA CHEST PULMONARY EMBOLISM W CONTRAST    Result Date: 10/1/2021  Narrative: INDICATION: PE suspected, high prob, shortness of breath TECHNIQUE: CT angiogram of the chest with 3D MIP reconstruction was performed with administration of 80 mL of Omnipaque 350 using PE protocol. COMPARISON: 07/13/2020 FINDINGS: Patient motion degrades evaluation of this exam. Study is diagnostic for detection of pulmonary artery embolism to the subsegmental level. There is no evidence of pulmonary artery embolism. Other findings: Unremarkable neck base. No axillary, mediastinal or hilar lymphadenopathy. Moderate cardiac size. No pericardial effusions.  Small amount of reflux of contrast into the IVC. No lung nodules.  streaky bibasilar opacities, and Mosaic attenuation of the lung bases.. No pleural effusions. No pneumothorax. No suspicious osseous abnormalities.  Degenerative changes of the osseous structures. Normal adrenal glands.     Impression: Patient respiratory motion degrades evaluation. No evidence of pulmonary artery embolism. Moderate cardiomegaly, with a small amount of reflux of contrast into the IVC, correlate for CHF/fluid overload type states.  Streaky bibasilar opacities and Mosaic attenuation of the lung bases, nonspecific, can be seen with fluid overload type states.  Clinical correlation advised. Electronically Signed by: ZACHARIAH MICHELLE D.O. Signed on: 10/1/2021 2:40 PM      [facility] EXTREMITY LOWER VENOUS DUPLEX    Result Date: 10/1/2021  Narrative: EXAM: US [facility] LOWER EXTREMITY VENOUS DUPLEX BILATERAL CLINICAL INDICATION: Bilateral lower extremity swelling.  Shortness of breath. COMPARISON: No previous examination for comparison. FINDINGS: Duplex compression technique has been used to examine the common femoral, superficial femoral, popliteal vein and proximal portion of the greater saphenous vein, profunda vein, posterior tibialis veins and peroneal veins of the left and right lower extremity.  Complete compressibility and color-flow is documented.     Impression: No evidence of deep venous thrombosis of the left and right lower extremity. If symptoms suggesting DVT persist, a follow-up ultrasound study is recommended the next day.  If this, too, is negative and symptoms persist, a final ultrasound is recommended at one week.  Two negative exams one week apart is considered sufficient to exclude DVT. (The rationale for the repeat exams is that an undetectable calf vein thrombus may propagate superiorly and become a threat for clinically significant pulmonary embolism, but will be sonographically detectable once it reaches the popliteal vein.) Electronically Signed by: MONA BERGER M.D. Signed on: 10/1/2021 2:14 PM     Cath/PV Case    Result Date: 10/8/2021  Narrative: · Mid LAD to Dist LAD lesion with 99% stenosis.  1.  Severe stenosis of the mid left anterior descending coronary artery successful angioplasty assisted stenting. 2.  Moderate left ventricular systolic dysfunction however limited by fluoroscopic imaging as well as ventricular ectopy however overall EF moderately reduced with ejection fraction 35% however for further evaluation recommend MUGA study 3.  Recommend dual antiplatelet therapy 1 year        TIME TAKEN FOR DISCHARGE:  1 hour    Discharge instructions, medications and followup appointment were discussed with the patient, wife and after visit summary was  given.     7

## 2022-04-08 NOTE — DISCHARGE NOTE NURSING/CASE MANAGEMENT/SOCIAL WORK - PATIENT PORTAL LINK FT
Offered and patient declined
You can access the FollowMyHealth Patient Portal offered by [facility] by registering at the following website: [email or web address]. By joining [employer]’s FollowMyHealth portal, you will also be able to view your health information using other applications (apps) compatible with our system.

## 2022-12-20 NOTE — ED ADULT NURSE NOTE - IN THE PAST 12 MONTHS HAVE YOU USED DRUGS OTHER THAN THOSE REQUIRED FOR MEDICAL REASON?

## 2023-03-06 NOTE — CHART NOTE - NSCHARTNOTEFT_GEN_A_CORE
Spoke with soraida thomson and she told me he has a drug problem, that at one point he was abusing NO and ended up "paralyzed". Called Mom [phone] and informed her about her son being intubated and adm to ICU. He did not offer any additional info re: drug abuse. Per soraida thomson the family is in denial re: his condition. Advancement Flap (Double) Text: Given the location of the defect and the proximity to free margins a double advancement flap was deemed most appropriate.  Using a sterile surgical marker, the appropriate advancement flaps were drawn incorporating the defect and placing the expected incisions within the relaxed skin tension lines where possible.    The area thus outlined was incised deep to adipose tissue with a #[id number] scalpel blade.  The skin margins were undermined to an appropriate distance in all directions utilizing iris scissors.

## 2023-05-24 NOTE — ED ADULT NURSE NOTE - NS ED NURSE RECORD ANOTHER HT AND WT
Render Risk Assessment In Note?: no Additional Notes: Patient will get actinic damage treated at next OV once eczema is cleared up Detail Level: Simple Yes

## 2024-04-16 NOTE — ED PROVIDER NOTE - NSFOLLOWUPINSTRUCTIONS_ED_ALL_ED_FT
FOLLOW UP WITH PMD  WITHIN 1-2DAYS, CALL TO MAKE APPOINTMENT  COME BACK TO ED IF YOUR CONDITION WORSENS OR IF YOU DEVELOP FEVER GREATER THAN 100.4F, CHEST PAIN,  SHORTNESS OF BREATH OR ANY OTHER SYMPTOMS CONCERNING TO YOU  TAKE TYLENOL (ACETAMINOPHEN) 650 MG EVERY 6 HOURS AS NEEDED FOR PAIN
Average build

## 2025-05-14 NOTE — CONSULT NOTE ADULT - SUBJECTIVE AND OBJECTIVE BOX
HPI:  40 y/o male with a PMHx of COPD and nicotine abuse, Substance abuse disorder (Nitrous oxide), presents to the ED c/o SOB, coughing and wheezing for the past few days.  He  was intubated shortly after due to lethargy and hypoxia on 6/1/2020. Pt is being treated for COPD exacerbation 2/2 Community Acquired PNA. COVID-negative. Pt was extubated on 6/3/2020, noted to have nocturnal bradycardia with snoring in ICU.  On 6/3 after pt returned from bathroom, felt dizzy & passed out for ~ 10seconds then regained consciousness spontaneously, tele 'bradycardic 30-40's bpm' no strip available in the chart. (Pt was on propofol till 6/2 & took xanax night before)    During tele review: SB 40-70's bpm, pauses w/junctional escape beat 30-40's bpm during sleep.  Pt is alert & OOB today, states 'snore alot' denies dizzy during ADL in the past, however 'passed out 2 years ago at work in hot summer day'.  states' feeling better today' denies cough, chill, fever.      1. Acute hypoxic respiratory failure with acute on chronic hypercapnic respiratory failure.   2. COPD exacerbation 2/2 Community Acquired PNA  3. Substance abuse disorder (Nitrous oxide)  4. Episodic Bradycardia      PAST MEDICAL & SURGICAL HISTORY:  Nicotine abuse  COPD (chronic obstructive pulmonary disease)  Bronchitis  History of hernia repair    FAMILY HISTORY:  No pertinent family history in first degree relatives      SOCIAL HISTORY: 1/2 pack daily smoker  Allergies    No Known Allergies    MEDICATIONS  (STANDING):  ALBUTerol    90 MICROgram(s) HFA Inhaler 2 Puff(s) Inhalation Once  azithromycin   Tablet 500 milliGRAM(s) Oral daily  cefTRIAXone Injectable. 1000 milliGRAM(s) IV Push every 24 hours  enoxaparin Injectable 40 milliGRAM(s) SubCutaneous every 12 hours  folic acid 1 milliGRAM(s) Oral daily  LORazepam     Tablet 2 milliGRAM(s) Oral every 4 hours  LORazepam     Tablet   Oral   methylPREDNISolone sodium succinate Injectable 20 milliGRAM(s) IV Push every 8 hours  multivitamin 1 Tablet(s) Oral daily  nicotine - 21 mG/24Hr(s) Patch 1 patch Transdermal daily  nystatin Powder 1 Application(s) Topical every 8 hours  senna 2 Tablet(s) Oral at bedtime  thiamine 100 milliGRAM(s) Oral daily  tiotropium 18 MICROgram(s) Capsule 1 Capsule(s) Inhalation daily    MEDICATIONS  (PRN):  ALBUTerol    90 MICROgram(s) HFA Inhaler 2 Puff(s) Inhalation every 4 hours PRN Shortness of Breath  LORazepam   Injectable 2 milliGRAM(s) IV Push every 2 hours PRN CIWA-Ar score increase by 2 points and a total score of 7 or less  LORazepam   Injectable 2 milliGRAM(s) IV Push every 1 hour PRN CIWA-Ar score 8 or greater    ROS: All other ROS is negative unless indicated above.    Physical Exam:  Vital Signs Last 24 Hrs  T(C): 36.6 (04 Jun 2020 09:05), Max: 37.1 (04 Jun 2020 04:15)  T(F): 97.9 (04 Jun 2020 09:05), Max: 98.7 (04 Jun 2020 04:15)  HR: 77 (04 Jun 2020 09:05) (57 - 77)  BP: 114/76 (04 Jun 2020 09:05) (101/74 - 125/77)  RR: 18 (04 Jun 2020 09:05) (15 - 24)  SpO2: 92% (04 Jun 2020 09:05) (87% - 96%)             Constitutional: well developed, well nourished, no deformities and no acute distress    Neurological: Alert & Oriented x 3, KHANNA, no focal deficits    HEENT: NC/AT, PERRLA, EOMI,  Neck supple.    Respiratory: CTA B/L, No wheezing/crackles/rhonchi    Cardiovascular: (+) S1 & S2, RRR, No m/r/g    Gastrointestinal: soft, NT, nondistended, (+) BS    Genitourinary: non distended bladder, voiding freely    Extremities: No pedal edema, No clubbing, No cyanosis    Skin:  normal skin color and pigmentation, no skin lesions    LABS:                        12.8   14.51 )-----------( 262      ( 04 Jun 2020 07:14 )             40.7     06-04    141  |  109<H>  |  22  ----------------------------<  189<H>  4.3   |  27  |  0.75    Ca    8.4<L>      04 Jun 2020 07:14  Phos  4.4     06-04  Mg     2.5     06-04    TPro  6.9  /  Alb  3.1<L>  /  TBili  0.4  /  DBili  <0.1  /  AST  28  /  ALT  119<H>  /  AlkPhos  91  06-04    EKG(6/1/2020):  bpm,  ms  CXR: LLL infiltrate  echo: NL LVEF No